# Patient Record
Sex: FEMALE | Race: WHITE | Employment: FULL TIME | URBAN - METROPOLITAN AREA
[De-identification: names, ages, dates, MRNs, and addresses within clinical notes are randomized per-mention and may not be internally consistent; named-entity substitution may affect disease eponyms.]

---

## 2017-04-19 ENCOUNTER — HOSPITAL ENCOUNTER (OUTPATIENT)
Age: 37
Discharge: HOME | End: 2017-04-19
Payer: COMMERCIAL

## 2017-04-19 LAB
THYROXINE, FREE: 0.79 NG/DL (ref 0.93–1.7)
TSH SERPL DL<=0.05 MIU/L-ACNC: 4.47 MIU/L (ref 0.3–5)

## 2017-04-19 PROCEDURE — 84439 ASSAY OF FREE THYROXINE: CPT

## 2017-04-19 PROCEDURE — 36415 COLL VENOUS BLD VENIPUNCTURE: CPT

## 2017-04-19 PROCEDURE — 84443 ASSAY THYROID STIM HORMONE: CPT

## 2017-10-04 ENCOUNTER — HOSPITAL ENCOUNTER (OUTPATIENT)
Age: 37
Discharge: HOME | End: 2017-10-04
Payer: COMMERCIAL

## 2017-10-04 LAB
T3 FREE: 3.29 PG/ML (ref 2.02–4.43)
THYROXINE, FREE: 0.91 NG/DL (ref 0.93–1.7)
TSH SERPL DL<=0.05 MIU/L-ACNC: 4.68 MIU/L (ref 0.3–5)

## 2017-10-04 PROCEDURE — 36415 COLL VENOUS BLD VENIPUNCTURE: CPT

## 2017-10-04 PROCEDURE — 84443 ASSAY THYROID STIM HORMONE: CPT

## 2017-10-04 PROCEDURE — 84481 FREE ASSAY (FT-3): CPT

## 2017-10-04 PROCEDURE — 84439 ASSAY OF FREE THYROXINE: CPT

## 2017-10-17 ENCOUNTER — HOSPITAL ENCOUNTER (EMERGENCY)
Age: 37
Discharge: HOME | End: 2017-10-17
Attending: EMERGENCY MEDICINE
Payer: COMMERCIAL

## 2017-10-17 VITALS
RESPIRATION RATE: 18 BRPM | HEART RATE: 82 BPM | SYSTOLIC BLOOD PRESSURE: 161 MMHG | TEMPERATURE: 98.1 F | BODY MASS INDEX: 47.43 KG/M2 | OXYGEN SATURATION: 99 % | WEIGHT: 285 LBS | DIASTOLIC BLOOD PRESSURE: 87 MMHG

## 2017-10-17 DIAGNOSIS — M54.31 RIGHT SIDED SCIATICA: Primary | ICD-10-CM

## 2017-10-17 PROCEDURE — 6370000000 HC RX 637 (ALT 250 FOR IP): Performed by: PHYSICIAN ASSISTANT

## 2017-10-17 PROCEDURE — 99283 EMERGENCY DEPT VISIT LOW MDM: CPT

## 2017-10-17 RX ORDER — LIOTHYRONINE SODIUM 5 UG/1
5 TABLET ORAL 2 TIMES DAILY
COMMUNITY

## 2017-10-17 RX ORDER — PREDNISONE 20 MG/1
50 TABLET ORAL ONCE
Status: COMPLETED | OUTPATIENT
Start: 2017-10-17 | End: 2017-10-17

## 2017-10-17 RX ORDER — PREDNISONE 10 MG/1
50 TABLET ORAL DAILY
Qty: 25 TABLET | Refills: 0 | Status: SHIPPED | OUTPATIENT
Start: 2017-10-17 | End: 2017-10-22

## 2017-10-17 RX ORDER — TRAMADOL HYDROCHLORIDE 50 MG/1
50 TABLET ORAL EVERY 6 HOURS PRN
Qty: 15 TABLET | Refills: 0 | Status: SHIPPED | OUTPATIENT
Start: 2017-10-17 | End: 2017-12-04

## 2017-10-17 RX ADMIN — PREDNISONE 50 MG: 20 TABLET ORAL at 20:53

## 2017-10-17 ASSESSMENT — ENCOUNTER SYMPTOMS
NAUSEA: 0
VOMITING: 0
ABDOMINAL PAIN: 0
SORE THROAT: 0

## 2017-10-17 ASSESSMENT — PAIN DESCRIPTION - PAIN TYPE: TYPE: ACUTE PAIN

## 2017-10-17 ASSESSMENT — PAIN DESCRIPTION - FREQUENCY: FREQUENCY: CONTINUOUS

## 2017-10-17 ASSESSMENT — PAIN DESCRIPTION - LOCATION: LOCATION: LEG;BUTTOCKS

## 2017-10-17 ASSESSMENT — PAIN DESCRIPTION - ORIENTATION: ORIENTATION: RIGHT

## 2017-10-17 ASSESSMENT — PAIN SCALES - GENERAL: PAINLEVEL_OUTOF10: 9

## 2017-10-17 ASSESSMENT — PAIN DESCRIPTION - DESCRIPTORS: DESCRIPTORS: SHOOTING;SHARP

## 2017-10-18 ENCOUNTER — TELEPHONE (OUTPATIENT)
Dept: FAMILY MEDICINE CLINIC | Age: 37
End: 2017-10-18

## 2017-10-18 ASSESSMENT — ENCOUNTER SYMPTOMS
COUGH: 0
BACK PAIN: 0
EYE DISCHARGE: 0
EYE REDNESS: 0
SHORTNESS OF BREATH: 0

## 2017-10-18 NOTE — ED PROVIDER NOTES
[facility] ED  [address] [address]  Phone: [phone]  Fax: [phone]      Pt Name: Johana Ernandez  MRN: [id number]  Kellie 1980  Date of evaluation: 10/17/2017      CHIEF COMPLAINT       Chief Complaint   Patient presents with    Leg Pain     right leg       HISTORY OF PRESENT ILLNESS   (Location, Quality, Severity, Duration, Timing, Context, Modifying Factors, Associated Signs and Symptoms)     Sneha Morales is a 39 y.o. female who presents to the ER for evaluation of right lower extremity pain. Patient states that over the past day, she has pain over the right buttocks that radiates down into the right lower extremity. Patient denies acute injury to the leg and back. She denies changes in bowel or bladder habits. Patient denies numbness and weakness in her lower extremities. She is able to ambulate. She states that she has taken Advil for her discomfort with no relief. She has also applied heat to the affected area and perform stretching exercises. She has no prior history of sciatica. She rates her current discomfort at 9/10. Nursing Notes were reviewed. REVIEW OF SYSTEMS     (2-9 systems for level 4, 10 or more for level 5)    Review of Systems   Constitutional: Negative for chills and fever. HENT: Negative for congestion, ear pain and sore throat. Eyes: Negative for discharge and redness. Respiratory: Negative for cough and shortness of breath. Cardiovascular: Negative for chest pain. Gastrointestinal: Negative for abdominal pain, nausea and vomiting. Genitourinary: Negative for dysuria and frequency. Musculoskeletal: Negative for back pain and neck pain. Right leg pain. Skin: Negative for rash. Neurological: Negative for dizziness, seizures, syncope and headaches. Psychiatric/Behavioral: Negative for self-injury and suicidal ideas. All other systems reviewed and are negative.     PAST MEDICAL HISTORY    has a past provide her with stretching exercises to help with the sciatica. Follow-up evaluation with primary care doctor in the next 2-3 days. EMERGENCY DEPARTMENT COURSE:   Vitals:    Vitals:    10/17/17 2000   BP: (!) 161/87   Pulse: 82   Resp: 18   Temp: 98.1 °F (36.7 °C)   TempSrc: Oral   SpO2: 99%   Weight: 129.3 kg (285 lb)     -------------------------  BP: (!) 161/87, Temp: 98.1 °F (36.7 °C), Pulse: 82, Resp: 18    The patient was given the following medications:  Orders Placed This Encounter   Medications    predniSONE (DELTASONE) 10 MG tablet     Sig: Take 5 tablets by mouth daily for 5 days     Dispense:  25 tablet     Refill:  0    traMADol (ULTRAM) 50 MG tablet     Sig: Take 1 tablet by mouth every 6 hours as needed for Pain     Dispense:  15 tablet     Refill:  0      FINAL IMPRESSION      1.  Right sided sciatica        DISPOSITION/PLAN   DISPOSITION Decision to Discharge- home    Condition on Disposition  Stable    PATIENT REFERRED TO:  Monica Yanez [address]  [phone]    Schedule an appointment as soon as possible for a visit in 2 days  For reevaluation    DISCHARGE MEDICATIONS:  New Prescriptions    PREDNISONE (DELTASONE) 10 MG TABLET    Take 5 tablets by mouth daily for 5 days    TRAMADOL (ULTRAM) 50 MG TABLET    Take 1 tablet by mouth every 6 hours as needed for Pain     (Please note that portions of this note were completed with a voice recognition program.  Efforts were made to edit the dictations but occasionally words are mis-transcribed.)    Tyrese Bergeron PA-C  10/18/17 8673

## 2017-10-18 NOTE — ED TRIAGE NOTES
Pt to ED with c/c of right leg pain. Pt states pain started yesterday, denies trauma or injury. Pt states that pain is radiating from mid right buttock down posterior leg to calf. Pt reports using heat and stretching to alleviate pain, which did not help. Aleve and Advil provided no relief. Warm blanket provided, call light within reach, PA-C at bedside, updated on pt condition.

## 2017-10-18 NOTE — ED PROVIDER NOTES
[facility] ED  [address] [place] [address]  Phone: [phone]  Fax: [phone]      Attending Physician Attestation    I performed a history and physical examination of the patient and discussed management with the mid level provider. I reviewed the mid level provider's note and agree with the documented findings and plan of care. Any areas of disagreement are noted on the chart. I was personally present for the key portions of any procedures. I have documented in the chart those procedures where I was not present during the key portions. I have reviewed the emergency nurses triage note. I agree with the chief complaint, past medical history, past surgical history, allergies, medications, social and family history as documented unless otherwise noted below. Documentation of the HPI, Physical Exam and Medical Decision Making performed by mid level providers is based on my personal performance of the HPI, PE and MDM. For Physician Assistant/ Nurse Practitioner cases/documentation I have personally evaluated this patient and have completed at least one if not all key elements of the E/M (history, physical exam, and MDM). Additional findings are as noted. CHIEF COMPLAINT       Chief Complaint   Patient presents with    Leg Pain     right leg         HISTORY OF PRESENT ILLNESS    Sneha Morales is a 39 y.o. female who presents Complaining of pain in her right buttocks going down her right leg. Pain is worse with movement and better without. She states that she just cannot get comfortable. She denies any known injury or trauma. No saddle paresthesias. No problems of bowel or bladder. PAST MEDICAL HISTORY    has a past medical history of Asthma. SURGICAL HISTORY      has a past surgical history that includes  section.     CURRENT MEDICATIONS       Previous Medications    B COMPLEX VITAMINS CAPSULE    Take 1 capsule by mouth 3 times daily (before meals) B-Right

## 2017-10-18 NOTE — TELEPHONE ENCOUNTER
Called for [place] f/frandy Vazquez on voice mail to call back & if pain persists we can do an MRI but would need to be seen.

## 2017-10-18 NOTE — TELEPHONE ENCOUNTER
----- Message from Germain Diaz MD sent at 10/18/2017 12:30 PM EDT -----  Needs a psoas muscle MRI if pain persists  ----- Message -----  From: Lynn Carroll MD  Sent: 10/17/2017   8:55 PM  To: Germain Diaz MD

## 2017-11-28 ENCOUNTER — HOSPITAL ENCOUNTER (OUTPATIENT)
Dept: NUCLEAR MEDICINE | Age: 37
Discharge: HOME | End: 2017-11-28
Payer: COMMERCIAL

## 2017-11-28 DIAGNOSIS — C73 THYROID CANCER (HCC): ICD-10-CM

## 2017-11-28 LAB — HCG QUALITATIVE: NEGATIVE

## 2017-11-28 PROCEDURE — 84703 CHORIONIC GONADOTROPIN ASSAY: CPT

## 2017-11-29 ENCOUNTER — HOSPITAL ENCOUNTER (OUTPATIENT)
Dept: NUCLEAR MEDICINE | Age: 37
Discharge: HOME | End: 2017-11-29
Payer: COMMERCIAL

## 2017-11-29 DIAGNOSIS — C73 THYROID CANCER (HCC): ICD-10-CM

## 2017-11-29 PROCEDURE — 3430000000 HC RX DIAGNOSTIC RADIOPHARMACEUTICAL: Performed by: INTERNAL MEDICINE

## 2017-11-29 PROCEDURE — 78018 THYROID MET IMAGING BODY: CPT

## 2017-11-29 PROCEDURE — A9517 I131 IODIDE CAP, RX: HCPCS | Performed by: INTERNAL MEDICINE

## 2017-11-29 PROCEDURE — 79005 NUCLEAR RX ORAL ADMIN: CPT

## 2017-11-29 RX ADMIN — Medication 103 MILLICURIE: at 15:00

## 2017-12-04 ENCOUNTER — HOSPITAL ENCOUNTER (EMERGENCY)
Age: 37
Discharge: HOME | End: 2017-12-04
Payer: COMMERCIAL

## 2017-12-04 VITALS
OXYGEN SATURATION: 98 % | TEMPERATURE: 98.6 F | DIASTOLIC BLOOD PRESSURE: 76 MMHG | SYSTOLIC BLOOD PRESSURE: 145 MMHG | BODY MASS INDEX: 47.48 KG/M2 | RESPIRATION RATE: 16 BRPM | HEIGHT: 65 IN | HEART RATE: 87 BPM | WEIGHT: 285 LBS

## 2017-12-04 DIAGNOSIS — J01.00 ACUTE NON-RECURRENT MAXILLARY SINUSITIS: ICD-10-CM

## 2017-12-04 DIAGNOSIS — J40 BRONCHITIS: Primary | ICD-10-CM

## 2017-12-04 PROCEDURE — 99212 OFFICE O/P EST SF 10 MIN: CPT

## 2017-12-04 PROCEDURE — 99213 OFFICE O/P EST LOW 20 MIN: CPT | Performed by: NURSE PRACTITIONER

## 2017-12-04 RX ORDER — FLUTICASONE PROPIONATE 50 MCG
1 SPRAY, SUSPENSION (ML) NASAL DAILY
Qty: 1 BOTTLE | Refills: 3 | Status: SHIPPED | OUTPATIENT
Start: 2017-12-04 | End: 2017-12-04

## 2017-12-04 RX ORDER — PREDNISONE 10 MG/1
10 TABLET ORAL DAILY
Qty: 7 TABLET | Refills: 0 | Status: SHIPPED | OUTPATIENT
Start: 2017-12-04 | End: 2017-12-11

## 2017-12-04 RX ORDER — BENZONATATE 200 MG/1
200 CAPSULE ORAL 3 TIMES DAILY PRN
Qty: 21 CAPSULE | Refills: 0 | Status: SHIPPED | OUTPATIENT
Start: 2017-12-04 | End: 2017-12-04

## 2017-12-04 RX ORDER — AMOXICILLIN AND CLAVULANATE POTASSIUM 875; 125 MG/1; MG/1
1 TABLET, FILM COATED ORAL 2 TIMES DAILY
Qty: 14 TABLET | Refills: 0 | Status: SHIPPED | OUTPATIENT
Start: 2017-12-04 | End: 2017-12-11

## 2017-12-04 RX ORDER — BENZONATATE 200 MG/1
200 CAPSULE ORAL 3 TIMES DAILY PRN
Qty: 21 CAPSULE | Refills: 0 | Status: SHIPPED | OUTPATIENT
Start: 2017-12-04 | End: 2017-12-11

## 2017-12-04 RX ORDER — FLUTICASONE PROPIONATE 50 MCG
1 SPRAY, SUSPENSION (ML) NASAL DAILY
Qty: 1 BOTTLE | Refills: 3 | Status: SHIPPED | OUTPATIENT
Start: 2017-12-04 | End: 2021-03-25 | Stop reason: ALTCHOICE

## 2017-12-04 NOTE — ED TRIAGE NOTES
Sneha arrives ambulatory by self  to room with complaint of URI. Symptoms started 5  days ago. Trenton Fried has a  Productive cough withgreen sputum.

## 2017-12-04 NOTE — ED PROVIDER NOTES
[facility]  Urgent Care Encounter      CHIEF COMPLAINT       Chief Complaint   Patient presents with    URI     Nurses Notes reviewed and I agree except as noted in the HPI. HISTORY OF PRESENT ILLNESS   Janel Morales is a 44 y.o. female who presents with complaints of a headache for 5 days, ear pressure, watery eyes, green/thick nasal drainage, coughing up thick green mucus, frontal sinus pressure. Denies fevers. No known exposure. No recent travel. No recent antibiotic use. She is not a smoker. She had sports inducted asthma as a child. She does have seasonal allergies, she takes zyrtec as needed. She has tried OTC aleve cold and sinus, Robitussin with little relief. REVIEW OF SYSTEMS     Review of Systems    PAST MEDICAL HISTORY         Diagnosis Date    Asthma        SURGICAL HISTORY     Patient  has a past surgical history that includes  section. CURRENT MEDICATIONS       Previous Medications    B COMPLEX VITAMINS CAPSULE    Take 1 capsule by mouth 3 times daily (before meals) B-Right by Graciela HADLEY COMPLEX-FOLIC ACID (K-977 BALANCED TR PO)    Take 1 tablet by mouth 3 times daily (before meals)    CALCIUM-MAGNESIUM-VITAMIN D 500-250-200 MG-MG-UNIT TABS    Take 1 tablet by mouth 3 times daily (before meals)    CETIRIZINE HCL (ZYRTEC ALLERGY PO)    Take by mouth    LIOTHYRONINE (CYTOMEL) 5 MCG TABLET    Take 5 mcg by mouth 2 times daily    NORGESTIM-ETH ESTRAD TRIPHASIC (TRI-PREVIFEM) 0.18/0.215/0.25 MG-35 MCG TABS    Take 1 tablet by mouth daily    VITAMIN D (CHOLECALCIFEROL) 5000 UNITS CAPS CAPSULE    Take 5,000 Units by mouth daily       ALLERGIES     Patient is has No Known Allergies.     FAMILY HISTORY     Patient's family history includes Arrhythmia in her father; Arthritis in her maternal grandfather and maternal grandmother; Cancer in her maternal grandfather, maternal grandmother, and paternal grandfather; Diabetes in her father; Heart Attack in her father; Heart Disease in her father; Heart Failure in her maternal grandmother; High Blood Pressure in her paternal grandmother; Neuropathy in her maternal grandmother; Other in her mother; Parkinsonism in her maternal grandfather; Prostate Cancer in her paternal grandfather; Stroke in her father. SOCIAL HISTORY     Patient  reports that she has never smoked. She has never used smokeless tobacco. She reports that she does not drink alcohol or use drugs. PHYSICAL EXAM     ED TRIAGE VITALS  BP: (!) 145/76, Temp: 98.6 °F (37 °C), Pulse: 87, Resp: 16, SpO2: 98 %  Physical Exam    DIAGNOSTIC RESULTS   Labs:No results found for this visit on 12/04/17. IMAGING:    URGENT CARE COURSE:     Vitals:    12/04/17 1125   BP: (!) 145/76   Pulse: 87   Resp: 16   Temp: 98.6 °F (37 °C)   TempSrc: Oral   SpO2: 98%   Weight: 285 lb (129.3 kg)   Height: 5' 5\" (1.651 m)       Medications - No data to display  PROCEDURES:  None  FINAL IMPRESSION      1. Bronchitis    2. Acute non-recurrent maxillary sinusitis        DISPOSITION/PLAN   DISPOSITION Decision to Discharge     ·   · Augmentin as directed until gone  · Flonase nasal spray  · Tessalon for the cough - take at night  · Prednisone as directed until gone  · Get plenty of rest  · Increase fluids  · Avoid secondhand smoke  · Can use the [place] Pot to rinse the sinuses as needed  · Cool-mist humidifier at night   · Use Tylenol or Ibuprofen (motrin) OTC as directed for fever  · Mucinex for the cough, take with lots of water  · OTC decongestant for 3-4 days to relieve congestion  · Return to office  if symptoms do not start to improve over the 7-10 days      Decision to Discharge nontoxic, well-hydrated, normal airway. Patient is afebrile and stable. No airway abscess or epiglottitis, sepsis, CNS infection, pneumonia, hypoxia, bronchospasm.  No ACS, CHF, PE    Patient was advised that the signs and symptoms are consistent with bronchitis and sinusitis  Will treat with Augmenting, Flonase, and Prednisone. Can also use Tylenol and Motrin as directed for fever and pain. Frequent handwashing, She can do the nasal saline wash - [place] Pot for sinus congestion  Rest as much as possible  increased oral clear liquids, vaporizer, rest.       Avoid tobacco exposure,Take medication as directed,Drink Lots of fluids. Advised to follow up with family doctor in the next 5-7 days for reevaluation. The patient may return to urgent care if does not get better or symptoms worsen. Patient is advised to go to ER immediately if present symptoms worsen, high fever >102, vomiting, breathing difficulty, chest pain, lethargy or new symptoms develop. Patient understands this approach of home management and agrees to the treatment plan. The patient  was advised that at this point the patient can be treated safely at home and should be aware of following interventions and advised to the watch for the following:  #1. Any increasing pain not controlled with Motrin or Tylenol. #2. Any development of drainage from the ears redness of the auricle or posterior ear. #3. Development of the any fever chills, headache or stiffness of the neck the patient needs to be reevaluated by the primary care provider, return here or go to the emergency department for reevaluation. The patient is agreeable to the outpatient management at this time. They are advised to follow-up with her primary care provider in 5-7 days for reevaluation. The patient left ambulatory without any problems.     PATIENT REFERRED TO:  Raquel Harris MD  [address] [place] [address]  [phone]    In 1 week  As needed, If symptoms worsen    DISCHARGE MEDICATIONS:  New Prescriptions    AMOXICILLIN-CLAVULANATE (AUGMENTIN) 875-125 MG PER TABLET    Take 1 tablet by mouth 2 times daily for 7 days    BENZONATATE (TESSALON) 200 MG CAPSULE    Take 1 capsule by mouth 3 times daily as needed for Cough    FLUTICASONE (FLONASE) 50 MCG/ACT NASAL SPRAY    1 spray by Nasal route daily    PREDNISONE (DELTASONE) 10 MG TABLET    Take 1 tablet by mouth daily for 7 days     Current Discharge Medication List          Jayne Michel, [address] [place] [address]  12/04/17 6742

## 2018-03-13 ENCOUNTER — HOSPITAL ENCOUNTER (OUTPATIENT)
Age: 38
Discharge: HOME | End: 2018-03-13
Payer: COMMERCIAL

## 2018-03-13 LAB
ABSOLUTE EOS #: 0.08 K/UL (ref 0–0.44)
ABSOLUTE IMMATURE GRANULOCYTE: <0.03 K/UL (ref 0–0.3)
ABSOLUTE LYMPH #: 1.55 K/UL (ref 1.1–3.7)
ABSOLUTE MONO #: 0.51 K/UL (ref 0.1–1.2)
ALBUMIN SERPL-MCNC: 3.9 G/DL (ref 3.5–5.2)
ALBUMIN/GLOBULIN RATIO: 1.2 (ref 1–2.5)
ALP BLD-CCNC: 93 U/L (ref 35–104)
ALT SERPL-CCNC: 48 U/L (ref 5–33)
ANION GAP SERPL CALCULATED.3IONS-SCNC: 15 MMOL/L (ref 9–17)
AST SERPL-CCNC: 47 U/L
BASOPHILS # BLD: 0 % (ref 0–2)
BASOPHILS ABSOLUTE: 0.03 K/UL (ref 0–0.2)
BILIRUB SERPL-MCNC: 0.52 MG/DL (ref 0.3–1.2)
BUN BLDV-MCNC: 7 MG/DL (ref 6–20)
BUN/CREAT BLD: ABNORMAL (ref 9–20)
CALCIUM SERPL-MCNC: 9 MG/DL (ref 8.6–10.4)
CHLORIDE BLD-SCNC: 100 MMOL/L (ref 98–107)
CO2: 26 MMOL/L (ref 20–31)
CREAT SERPL-MCNC: 0.61 MG/DL (ref 0.5–0.9)
DIFFERENTIAL TYPE: ABNORMAL
EOSINOPHILS RELATIVE PERCENT: 1 % (ref 1–4)
FOLATE: 3.6 NG/ML
GFR AFRICAN AMERICAN: >60 ML/MIN
GFR NON-AFRICAN AMERICAN: >60 ML/MIN
GFR SERPL CREATININE-BSD FRML MDRD: ABNORMAL ML/MIN/{1.73_M2}
GFR SERPL CREATININE-BSD FRML MDRD: ABNORMAL ML/MIN/{1.73_M2}
GLUCOSE BLD-MCNC: 161 MG/DL (ref 70–99)
HCT VFR BLD CALC: 42.2 % (ref 36.3–47.1)
HEMOGLOBIN: 13 G/DL (ref 11.9–15.1)
IMMATURE GRANULOCYTES: 0 %
IRON: 49 UG/DL (ref 37–145)
LYMPHOCYTES # BLD: 22 % (ref 24–43)
MAGNESIUM: 2.1 MG/DL (ref 1.6–2.6)
MCH RBC QN AUTO: 27.8 PG (ref 25.2–33.5)
MCHC RBC AUTO-ENTMCNC: 30.8 G/DL (ref 28.4–34.8)
MCV RBC AUTO: 90.4 FL (ref 82.6–102.9)
MONOCYTES # BLD: 7 % (ref 3–12)
NRBC AUTOMATED: 0 PER 100 WBC
PDW BLD-RTO: 13 % (ref 11.8–14.4)
PHOSPHORUS: 2.6 MG/DL (ref 2.6–4.5)
PLATELET # BLD: 309 K/UL (ref 138–453)
PLATELET ESTIMATE: ABNORMAL
PMV BLD AUTO: 11.5 FL (ref 8.1–13.5)
POTASSIUM SERPL-SCNC: 4.1 MMOL/L (ref 3.7–5.3)
RBC # BLD: 4.67 M/UL (ref 3.95–5.11)
RBC # BLD: ABNORMAL 10*6/UL
SEG NEUTROPHILS: 70 % (ref 36–65)
SEGMENTED NEUTROPHILS ABSOLUTE COUNT: 4.75 K/UL (ref 1.5–8.1)
SODIUM BLD-SCNC: 141 MMOL/L (ref 135–144)
TOTAL PROTEIN: 7.2 G/DL (ref 6.4–8.3)
VITAMIN B-12: 793 PG/ML (ref 232–1245)
VITAMIN D 25-HYDROXY: 17.3 NG/ML (ref 30–100)
WBC # BLD: 6.9 K/UL (ref 3.5–11.3)
WBC # BLD: ABNORMAL 10*3/UL

## 2018-03-13 PROCEDURE — 84425 ASSAY OF VITAMIN B-1: CPT

## 2018-03-13 PROCEDURE — 82607 VITAMIN B-12: CPT

## 2018-03-13 PROCEDURE — 83735 ASSAY OF MAGNESIUM: CPT

## 2018-03-13 PROCEDURE — 80053 COMPREHEN METABOLIC PANEL: CPT

## 2018-03-13 PROCEDURE — 36415 COLL VENOUS BLD VENIPUNCTURE: CPT

## 2018-03-13 PROCEDURE — 82306 VITAMIN D 25 HYDROXY: CPT

## 2018-03-13 PROCEDURE — 84100 ASSAY OF PHOSPHORUS: CPT

## 2018-03-13 PROCEDURE — 82746 ASSAY OF FOLIC ACID SERUM: CPT

## 2018-03-13 PROCEDURE — 85025 COMPLETE CBC W/AUTO DIFF WBC: CPT

## 2018-03-13 PROCEDURE — 83540 ASSAY OF IRON: CPT

## 2018-03-20 LAB — VITAMIN B1 WHOLE BLOOD: 86 NMOL/L (ref 70–180)

## 2018-04-04 ENCOUNTER — HOSPITAL ENCOUNTER (OUTPATIENT)
Age: 38
Discharge: HOME | End: 2018-04-04
Payer: COMMERCIAL

## 2018-04-04 LAB
CALCIUM SERPL-MCNC: 9.3 MG/DL (ref 8.6–10.4)
TESTOSTERONE TOTAL: 48 NG/DL (ref 20–70)
THYROGLOBULIN AB: <20 IU/ML (ref 0–40)
THYROGLOBULIN: <0.2 NG/ML (ref 0–63.4)
THYROXINE, FREE: 2.28 NG/DL (ref 0.93–1.7)
TSH SERPL DL<=0.05 MIU/L-ACNC: 0.07 MIU/L (ref 0.3–5)

## 2018-04-04 PROCEDURE — 83498 ASY HYDROXYPROGESTERONE 17-D: CPT

## 2018-04-04 PROCEDURE — 36415 COLL VENOUS BLD VENIPUNCTURE: CPT

## 2018-04-04 PROCEDURE — 84443 ASSAY THYROID STIM HORMONE: CPT

## 2018-04-04 PROCEDURE — 84439 ASSAY OF FREE THYROXINE: CPT

## 2018-04-04 PROCEDURE — 82310 ASSAY OF CALCIUM: CPT

## 2018-04-04 PROCEDURE — 84403 ASSAY OF TOTAL TESTOSTERONE: CPT

## 2018-04-04 PROCEDURE — 86800 THYROGLOBULIN ANTIBODY: CPT

## 2018-04-04 PROCEDURE — 84432 ASSAY OF THYROGLOBULIN: CPT

## 2018-04-04 PROCEDURE — 82627 DEHYDROEPIANDROSTERONE: CPT

## 2018-04-05 LAB — DHEAS (DHEA SULFATE): 211 UG/DL (ref 45–270)

## 2018-04-07 LAB — 17 OH PROGESTERONE: 103 NG/DL

## 2018-05-10 ENCOUNTER — HOSPITAL ENCOUNTER (OUTPATIENT)
Age: 38
Discharge: HOME | End: 2018-05-10
Payer: COMMERCIAL

## 2018-05-10 LAB
ALBUMIN SERPL-MCNC: 4 G/DL (ref 3.5–5.2)
ALBUMIN/GLOBULIN RATIO: 1.3 (ref 1–2.5)
ALP BLD-CCNC: 100 U/L (ref 35–104)
ALT SERPL-CCNC: 21 U/L (ref 5–33)
ANION GAP SERPL CALCULATED.3IONS-SCNC: 12 MMOL/L (ref 9–17)
AST SERPL-CCNC: 18 U/L
BILIRUB SERPL-MCNC: 0.58 MG/DL (ref 0.3–1.2)
BUN BLDV-MCNC: 9 MG/DL (ref 6–20)
BUN/CREAT BLD: ABNORMAL (ref 9–20)
CALCIUM SERPL-MCNC: 8.6 MG/DL (ref 8.6–10.4)
CHLORIDE BLD-SCNC: 101 MMOL/L (ref 98–107)
CO2: 24 MMOL/L (ref 20–31)
CREAT SERPL-MCNC: 0.52 MG/DL (ref 0.5–0.9)
GFR AFRICAN AMERICAN: >60 ML/MIN
GFR NON-AFRICAN AMERICAN: >60 ML/MIN
GFR SERPL CREATININE-BSD FRML MDRD: ABNORMAL ML/MIN/{1.73_M2}
GFR SERPL CREATININE-BSD FRML MDRD: ABNORMAL ML/MIN/{1.73_M2}
GLUCOSE BLD-MCNC: 235 MG/DL (ref 70–99)
IRON SATURATION: 20 % (ref 20–55)
IRON: 58 UG/DL (ref 37–145)
POTASSIUM SERPL-SCNC: 3.9 MMOL/L (ref 3.7–5.3)
SODIUM BLD-SCNC: 137 MMOL/L (ref 135–144)
THYROXINE, FREE: 2.47 NG/DL (ref 0.93–1.7)
TOTAL IRON BINDING CAPACITY: 286 UG/DL (ref 250–450)
TOTAL PROTEIN: 7.2 G/DL (ref 6.4–8.3)
TSH SERPL DL<=0.05 MIU/L-ACNC: 0.04 MIU/L (ref 0.3–5)
UNSATURATED IRON BINDING CAPACITY: 228 UG/DL (ref 112–347)
VITAMIN D 25-HYDROXY: 21.5 NG/ML (ref 30–100)

## 2018-05-10 PROCEDURE — 83540 ASSAY OF IRON: CPT

## 2018-05-10 PROCEDURE — 82306 VITAMIN D 25 HYDROXY: CPT

## 2018-05-10 PROCEDURE — 83550 IRON BINDING TEST: CPT

## 2018-05-10 PROCEDURE — 80053 COMPREHEN METABOLIC PANEL: CPT

## 2018-05-10 PROCEDURE — 36415 COLL VENOUS BLD VENIPUNCTURE: CPT

## 2018-05-10 PROCEDURE — 84439 ASSAY OF FREE THYROXINE: CPT

## 2018-05-10 PROCEDURE — 84443 ASSAY THYROID STIM HORMONE: CPT

## 2018-05-10 PROCEDURE — 84432 ASSAY OF THYROGLOBULIN: CPT

## 2018-05-10 PROCEDURE — 86800 THYROGLOBULIN ANTIBODY: CPT

## 2018-05-11 LAB
THYROGLOBULIN AB: <20 IU/ML (ref 0–40)
THYROGLOBULIN: <0.2 NG/ML (ref 0–63.4)

## 2018-07-06 ENCOUNTER — HOSPITAL ENCOUNTER (OUTPATIENT)
Age: 38
Discharge: HOME | End: 2018-07-06
Payer: COMMERCIAL

## 2018-07-06 LAB
ABSOLUTE EOS #: 0.07 K/UL (ref 0–0.44)
ABSOLUTE IMMATURE GRANULOCYTE: <0.03 K/UL (ref 0–0.3)
ABSOLUTE LYMPH #: 1.65 K/UL (ref 1.1–3.7)
ABSOLUTE MONO #: 0.43 K/UL (ref 0.1–1.2)
ALBUMIN SERPL-MCNC: 4.3 G/DL (ref 3.5–5.2)
ALBUMIN/GLOBULIN RATIO: 1.4 (ref 1–2.5)
ALP BLD-CCNC: 102 U/L (ref 35–104)
ALT SERPL-CCNC: 15 U/L (ref 5–33)
ANION GAP SERPL CALCULATED.3IONS-SCNC: 15 MMOL/L (ref 9–17)
AST SERPL-CCNC: 18 U/L
BASOPHILS # BLD: 0 % (ref 0–2)
BASOPHILS ABSOLUTE: 0.03 K/UL (ref 0–0.2)
BILIRUB SERPL-MCNC: 0.63 MG/DL (ref 0.3–1.2)
BUN BLDV-MCNC: 7 MG/DL (ref 6–20)
BUN/CREAT BLD: ABNORMAL (ref 9–20)
CALCIUM SERPL-MCNC: 9 MG/DL (ref 8.6–10.4)
CHLORIDE BLD-SCNC: 102 MMOL/L (ref 98–107)
CO2: 23 MMOL/L (ref 20–31)
CREAT SERPL-MCNC: 0.52 MG/DL (ref 0.5–0.9)
DIFFERENTIAL TYPE: ABNORMAL
EOSINOPHILS RELATIVE PERCENT: 1 % (ref 1–4)
FOLATE: 8.1 NG/ML
GFR AFRICAN AMERICAN: >60 ML/MIN
GFR NON-AFRICAN AMERICAN: >60 ML/MIN
GFR SERPL CREATININE-BSD FRML MDRD: ABNORMAL ML/MIN/{1.73_M2}
GFR SERPL CREATININE-BSD FRML MDRD: ABNORMAL ML/MIN/{1.73_M2}
GLUCOSE BLD-MCNC: 216 MG/DL (ref 70–99)
HCT VFR BLD CALC: 42.6 % (ref 36.3–47.1)
HEMOGLOBIN: 13.7 G/DL (ref 11.9–15.1)
IMMATURE GRANULOCYTES: 0 %
IRON SATURATION: 42 % (ref 20–55)
IRON: 121 UG/DL (ref 37–145)
IRON: 123 UG/DL (ref 37–145)
LYMPHOCYTES # BLD: 23 % (ref 24–43)
MAGNESIUM: 2 MG/DL (ref 1.6–2.6)
MCH RBC QN AUTO: 28 PG (ref 25.2–33.5)
MCHC RBC AUTO-ENTMCNC: 32.2 G/DL (ref 28.4–34.8)
MCV RBC AUTO: 86.9 FL (ref 82.6–102.9)
MONOCYTES # BLD: 6 % (ref 3–12)
NRBC AUTOMATED: 0 PER 100 WBC
PDW BLD-RTO: 14.3 % (ref 11.8–14.4)
PHOSPHORUS: 3.1 MG/DL (ref 2.6–4.5)
PLATELET # BLD: 260 K/UL (ref 138–453)
PLATELET ESTIMATE: ABNORMAL
PMV BLD AUTO: 11.9 FL (ref 8.1–13.5)
POTASSIUM SERPL-SCNC: 4.1 MMOL/L (ref 3.7–5.3)
RBC # BLD: 4.9 M/UL (ref 3.95–5.11)
RBC # BLD: ABNORMAL 10*6/UL
SEG NEUTROPHILS: 70 % (ref 36–65)
SEGMENTED NEUTROPHILS ABSOLUTE COUNT: 5.09 K/UL (ref 1.5–8.1)
SODIUM BLD-SCNC: 140 MMOL/L (ref 135–144)
THYROXINE, FREE: 1.81 NG/DL (ref 0.93–1.7)
TOTAL IRON BINDING CAPACITY: 287 UG/DL (ref 250–450)
TOTAL PROTEIN: 7.3 G/DL (ref 6.4–8.3)
TSH SERPL DL<=0.05 MIU/L-ACNC: 0.78 MIU/L (ref 0.3–5)
UNSATURATED IRON BINDING CAPACITY: 166 UG/DL (ref 112–347)
VITAMIN B-12: 771 PG/ML (ref 232–1245)
VITAMIN D 25-HYDROXY: 32 NG/ML (ref 30–100)
VITAMIN D 25-HYDROXY: 32 NG/ML (ref 30–100)
WBC # BLD: 7.3 K/UL (ref 3.5–11.3)
WBC # BLD: ABNORMAL 10*3/UL

## 2018-07-06 PROCEDURE — 83550 IRON BINDING TEST: CPT

## 2018-07-06 PROCEDURE — 82306 VITAMIN D 25 HYDROXY: CPT

## 2018-07-06 PROCEDURE — 82746 ASSAY OF FOLIC ACID SERUM: CPT

## 2018-07-06 PROCEDURE — 85025 COMPLETE CBC W/AUTO DIFF WBC: CPT

## 2018-07-06 PROCEDURE — 82607 VITAMIN B-12: CPT

## 2018-07-06 PROCEDURE — 83540 ASSAY OF IRON: CPT

## 2018-07-06 PROCEDURE — 80053 COMPREHEN METABOLIC PANEL: CPT

## 2018-07-06 PROCEDURE — 83735 ASSAY OF MAGNESIUM: CPT

## 2018-07-06 PROCEDURE — 84425 ASSAY OF VITAMIN B-1: CPT

## 2018-07-06 PROCEDURE — 84432 ASSAY OF THYROGLOBULIN: CPT

## 2018-07-06 PROCEDURE — 86800 THYROGLOBULIN ANTIBODY: CPT

## 2018-07-06 PROCEDURE — 84443 ASSAY THYROID STIM HORMONE: CPT

## 2018-07-06 PROCEDURE — 84439 ASSAY OF FREE THYROXINE: CPT

## 2018-07-06 PROCEDURE — 84100 ASSAY OF PHOSPHORUS: CPT

## 2018-07-06 PROCEDURE — 36415 COLL VENOUS BLD VENIPUNCTURE: CPT

## 2018-07-09 LAB
THYROGLOBULIN AB: <20 IU/ML (ref 0–40)
THYROGLOBULIN: <0.2 NG/ML (ref 0–63.4)

## 2018-07-10 LAB — VITAMIN B1 WHOLE BLOOD: 101 NMOL/L (ref 70–180)

## 2018-09-26 ENCOUNTER — HOSPITAL ENCOUNTER (OUTPATIENT)
Age: 38
Discharge: HOME | End: 2018-09-26
Payer: COMMERCIAL

## 2018-09-26 ENCOUNTER — HOSPITAL ENCOUNTER (OUTPATIENT)
Dept: ULTRASOUND IMAGING | Age: 38
Discharge: HOME | End: 2018-09-28
Payer: COMMERCIAL

## 2018-09-26 DIAGNOSIS — C73 THYROID CANCER (HCC): ICD-10-CM

## 2018-09-26 LAB
IRON SATURATION: 36 % (ref 20–55)
IRON: 97 UG/DL (ref 37–145)
THYROXINE, FREE: 1.92 NG/DL (ref 0.93–1.7)
TOTAL IRON BINDING CAPACITY: 271 UG/DL (ref 250–450)
TSH SERPL DL<=0.05 MIU/L-ACNC: 0.19 MIU/L (ref 0.3–5)
UNSATURATED IRON BINDING CAPACITY: 174 UG/DL (ref 112–347)
VITAMIN D 25-HYDROXY: 37 NG/ML (ref 30–100)

## 2018-09-26 PROCEDURE — 83550 IRON BINDING TEST: CPT

## 2018-09-26 PROCEDURE — 76536 US EXAM OF HEAD AND NECK: CPT

## 2018-09-26 PROCEDURE — 86800 THYROGLOBULIN ANTIBODY: CPT

## 2018-09-26 PROCEDURE — 83540 ASSAY OF IRON: CPT

## 2018-09-26 PROCEDURE — 82306 VITAMIN D 25 HYDROXY: CPT

## 2018-09-26 PROCEDURE — 84443 ASSAY THYROID STIM HORMONE: CPT

## 2018-09-26 PROCEDURE — 86376 MICROSOMAL ANTIBODY EACH: CPT

## 2018-09-26 PROCEDURE — 84439 ASSAY OF FREE THYROXINE: CPT

## 2018-09-26 PROCEDURE — 84432 ASSAY OF THYROGLOBULIN: CPT

## 2018-09-26 PROCEDURE — 36415 COLL VENOUS BLD VENIPUNCTURE: CPT

## 2018-09-27 LAB
THYROGLOBULIN AB: <20 IU/ML (ref 0–40)
THYROGLOBULIN: <0.2 NG/ML (ref 0–63.4)
THYROID PEROXIDASE (TPO) AB: 43.4 IU/ML (ref 0–35)

## 2018-10-29 ENCOUNTER — HOSPITAL ENCOUNTER (OUTPATIENT)
Age: 38
Discharge: HOME | End: 2018-10-29
Payer: COMMERCIAL

## 2018-10-29 LAB
ALBUMIN SERPL-MCNC: 4.3 G/DL (ref 3.5–5.2)
ALBUMIN/GLOBULIN RATIO: 1.4 (ref 1–2.5)
ALP BLD-CCNC: 99 U/L (ref 35–104)
ALT SERPL-CCNC: 17 U/L (ref 5–33)
ANION GAP SERPL CALCULATED.3IONS-SCNC: 12 MMOL/L (ref 9–17)
AST SERPL-CCNC: 17 U/L
BILIRUB SERPL-MCNC: 0.63 MG/DL (ref 0.3–1.2)
BUN BLDV-MCNC: 8 MG/DL (ref 6–20)
BUN/CREAT BLD: ABNORMAL (ref 9–20)
CALCIUM SERPL-MCNC: 9.5 MG/DL (ref 8.6–10.4)
CHLORIDE BLD-SCNC: 96 MMOL/L (ref 98–107)
CO2: 28 MMOL/L (ref 20–31)
CREAT SERPL-MCNC: 0.53 MG/DL (ref 0.5–0.9)
GFR AFRICAN AMERICAN: >60 ML/MIN
GFR NON-AFRICAN AMERICAN: >60 ML/MIN
GFR SERPL CREATININE-BSD FRML MDRD: ABNORMAL ML/MIN/{1.73_M2}
GFR SERPL CREATININE-BSD FRML MDRD: ABNORMAL ML/MIN/{1.73_M2}
GLUCOSE BLD-MCNC: 299 MG/DL (ref 70–99)
IRON SATURATION: 43 % (ref 20–55)
IRON: 117 UG/DL (ref 37–145)
POTASSIUM SERPL-SCNC: 4.3 MMOL/L (ref 3.7–5.3)
SODIUM BLD-SCNC: 136 MMOL/L (ref 135–144)
TOTAL IRON BINDING CAPACITY: 275 UG/DL (ref 250–450)
TOTAL PROTEIN: 7.3 G/DL (ref 6.4–8.3)
UNSATURATED IRON BINDING CAPACITY: 158 UG/DL (ref 112–347)

## 2018-10-29 PROCEDURE — 84244 ASSAY OF RENIN: CPT

## 2018-10-29 PROCEDURE — 83540 ASSAY OF IRON: CPT

## 2018-10-29 PROCEDURE — 82088 ASSAY OF ALDOSTERONE: CPT

## 2018-10-29 PROCEDURE — 36415 COLL VENOUS BLD VENIPUNCTURE: CPT

## 2018-10-29 PROCEDURE — 80053 COMPREHEN METABOLIC PANEL: CPT

## 2018-10-29 PROCEDURE — 83550 IRON BINDING TEST: CPT

## 2018-10-31 LAB
ALDOSTERONE COMMENT: NORMAL
ALDOSTERONE: 14.7 NG/DL

## 2018-11-01 LAB
RENIN ACTIVITY: 8 NG/ML/HR
RENIN COMMENT: NORMAL

## 2018-11-08 ENCOUNTER — HOSPITAL ENCOUNTER (OUTPATIENT)
Age: 38
Discharge: HOME | End: 2018-11-08
Payer: COMMERCIAL

## 2018-11-08 PROCEDURE — 36415 COLL VENOUS BLD VENIPUNCTURE: CPT

## 2018-11-08 PROCEDURE — 82952 GTT-ADDED SAMPLES: CPT

## 2018-11-08 PROCEDURE — 82951 GLUCOSE TOLERANCE TEST (GTT): CPT

## 2018-11-09 LAB
AMOUNT GLUCOSE GIVEN: 100 G
GLUCOSE FASTING: 281 MG/DL (ref 65–99)
GLUCOSE TOLERANCE TEST 1 HOUR: 541 MG/DL (ref 65–184)
GLUCOSE TOLERANCE TEST 2 HOUR: 422 MG/DL (ref 65–139)
GLUCOSE TOLERANCE TEST 3 HOUR: 338 MG/DL (ref 65–130)
GLUCOSE, 4 HOUR: 300 MG/DL (ref 65–125)
GLUCOSE, 5 HR: 260 MG/DL (ref 65–109)

## 2018-11-28 ENCOUNTER — HOSPITAL ENCOUNTER (OUTPATIENT)
Age: 38
Discharge: HOME | End: 2018-11-28
Payer: COMMERCIAL

## 2018-11-28 LAB
CHOLESTEROL: 124 MG/DL
CREATININE URINE: 350 MG/DL (ref 28–217)
HDLC SERPL-MCNC: 44 MG/DL
MICROALBUMIN/CREAT 24H UR: 35 MG/L
MICROALBUMIN/CREAT UR-RTO: 10 MCG/MG CREAT

## 2018-11-28 PROCEDURE — 36415 COLL VENOUS BLD VENIPUNCTURE: CPT

## 2018-11-28 PROCEDURE — 82570 ASSAY OF URINE CREATININE: CPT

## 2018-11-28 PROCEDURE — 83718 ASSAY OF LIPOPROTEIN: CPT

## 2018-11-28 PROCEDURE — 82465 ASSAY BLD/SERUM CHOLESTEROL: CPT

## 2018-11-28 PROCEDURE — 82043 UR ALBUMIN QUANTITATIVE: CPT

## 2018-12-29 ENCOUNTER — HOSPITAL ENCOUNTER (EMERGENCY)
Age: 38
Discharge: HOME | End: 2018-12-29
Attending: EMERGENCY MEDICINE
Payer: COMMERCIAL

## 2018-12-29 VITALS
HEART RATE: 87 BPM | OXYGEN SATURATION: 98 % | HEIGHT: 64 IN | WEIGHT: 160 LBS | TEMPERATURE: 98.1 F | SYSTOLIC BLOOD PRESSURE: 120 MMHG | BODY MASS INDEX: 27.31 KG/M2 | RESPIRATION RATE: 18 BRPM | DIASTOLIC BLOOD PRESSURE: 74 MMHG

## 2018-12-29 DIAGNOSIS — J02.0 STREPTOCOCCAL SORE THROAT: Primary | ICD-10-CM

## 2018-12-29 LAB
DIRECT EXAM: ABNORMAL
Lab: ABNORMAL
MONONUCLEOSIS SCREEN: NEGATIVE
SPECIMEN DESCRIPTION: ABNORMAL
STATUS: ABNORMAL

## 2018-12-29 PROCEDURE — 87880 STREP A ASSAY W/OPTIC: CPT

## 2018-12-29 PROCEDURE — 36415 COLL VENOUS BLD VENIPUNCTURE: CPT

## 2018-12-29 PROCEDURE — 86308 HETEROPHILE ANTIBODY SCREEN: CPT

## 2018-12-29 PROCEDURE — 99283 EMERGENCY DEPT VISIT LOW MDM: CPT

## 2018-12-29 RX ORDER — AMOXICILLIN 500 MG/1
500 CAPSULE ORAL 3 TIMES DAILY
Qty: 21 CAPSULE | Refills: 0 | Status: SHIPPED | OUTPATIENT
Start: 2018-12-29 | End: 2021-03-25 | Stop reason: ALTCHOICE

## 2018-12-29 ASSESSMENT — PAIN DESCRIPTION - LOCATION: LOCATION: THROAT

## 2018-12-29 ASSESSMENT — PAIN SCALES - GENERAL: PAINLEVEL_OUTOF10: 6

## 2018-12-29 ASSESSMENT — PAIN DESCRIPTION - PAIN TYPE: TYPE: ACUTE PAIN

## 2018-12-29 ASSESSMENT — PAIN DESCRIPTION - DESCRIPTORS: DESCRIPTORS: SHARP

## 2019-01-10 ENCOUNTER — HOSPITAL ENCOUNTER (OUTPATIENT)
Age: 39
Discharge: HOME | End: 2019-01-10
Payer: COMMERCIAL

## 2019-01-10 LAB
ABSOLUTE EOS #: 0.12 K/UL (ref 0–0.44)
ABSOLUTE IMMATURE GRANULOCYTE: <0.03 K/UL (ref 0–0.3)
ABSOLUTE LYMPH #: 2.25 K/UL (ref 1.1–3.7)
ABSOLUTE MONO #: 0.45 K/UL (ref 0.1–1.2)
BASOPHILS # BLD: 0 % (ref 0–2)
BASOPHILS ABSOLUTE: 0.04 K/UL (ref 0–0.2)
DIFFERENTIAL TYPE: ABNORMAL
EOSINOPHILS RELATIVE PERCENT: 1 % (ref 1–4)
HCT VFR BLD CALC: 40 % (ref 36.3–47.1)
HEMOGLOBIN: 12.9 G/DL (ref 11.9–15.1)
IMMATURE GRANULOCYTES: 0 %
LYMPHOCYTES # BLD: 25 % (ref 24–43)
MCH RBC QN AUTO: 29.1 PG (ref 25.2–33.5)
MCHC RBC AUTO-ENTMCNC: 32.3 G/DL (ref 28.4–34.8)
MCV RBC AUTO: 90.3 FL (ref 82.6–102.9)
MONOCYTES # BLD: 5 % (ref 3–12)
NRBC AUTOMATED: 0 PER 100 WBC
PDW BLD-RTO: 12.7 % (ref 11.8–14.4)
PLATELET # BLD: 272 K/UL (ref 138–453)
PLATELET ESTIMATE: ABNORMAL
PMV BLD AUTO: 12 FL (ref 8.1–13.5)
RBC # BLD: 4.43 M/UL (ref 3.95–5.11)
RBC # BLD: ABNORMAL 10*6/UL
SEG NEUTROPHILS: 69 % (ref 36–65)
SEGMENTED NEUTROPHILS ABSOLUTE COUNT: 6.24 K/UL (ref 1.5–8.1)
WBC # BLD: 9.1 K/UL (ref 3.5–11.3)
WBC # BLD: ABNORMAL 10*3/UL

## 2019-01-10 PROCEDURE — 83540 ASSAY OF IRON: CPT

## 2019-01-10 PROCEDURE — 82306 VITAMIN D 25 HYDROXY: CPT

## 2019-01-10 PROCEDURE — 82746 ASSAY OF FOLIC ACID SERUM: CPT

## 2019-01-10 PROCEDURE — 36415 COLL VENOUS BLD VENIPUNCTURE: CPT

## 2019-01-10 PROCEDURE — 85025 COMPLETE CBC W/AUTO DIFF WBC: CPT

## 2019-01-10 PROCEDURE — 84100 ASSAY OF PHOSPHORUS: CPT

## 2019-01-10 PROCEDURE — 83735 ASSAY OF MAGNESIUM: CPT

## 2019-01-10 PROCEDURE — 80053 COMPREHEN METABOLIC PANEL: CPT

## 2019-01-10 PROCEDURE — 82607 VITAMIN B-12: CPT

## 2019-01-10 PROCEDURE — 84425 ASSAY OF VITAMIN B-1: CPT

## 2019-01-11 LAB
ALBUMIN SERPL-MCNC: 4.2 G/DL (ref 3.5–5.2)
ALBUMIN/GLOBULIN RATIO: 1.4 (ref 1–2.5)
ALP BLD-CCNC: 63 U/L (ref 35–104)
ALT SERPL-CCNC: 7 U/L (ref 5–33)
ANION GAP SERPL CALCULATED.3IONS-SCNC: 15 MMOL/L (ref 9–17)
AST SERPL-CCNC: 9 U/L
BILIRUB SERPL-MCNC: 0.3 MG/DL (ref 0.3–1.2)
BUN BLDV-MCNC: 9 MG/DL (ref 6–20)
BUN/CREAT BLD: ABNORMAL (ref 9–20)
CALCIUM SERPL-MCNC: 9 MG/DL (ref 8.6–10.4)
CHLORIDE BLD-SCNC: 102 MMOL/L (ref 98–107)
CO2: 25 MMOL/L (ref 20–31)
CREAT SERPL-MCNC: 0.51 MG/DL (ref 0.5–0.9)
FOLATE: 5.7 NG/ML
GFR AFRICAN AMERICAN: >60 ML/MIN
GFR NON-AFRICAN AMERICAN: >60 ML/MIN
GFR SERPL CREATININE-BSD FRML MDRD: ABNORMAL ML/MIN/{1.73_M2}
GFR SERPL CREATININE-BSD FRML MDRD: ABNORMAL ML/MIN/{1.73_M2}
GLUCOSE BLD-MCNC: 147 MG/DL (ref 70–99)
IRON: 46 UG/DL (ref 37–145)
MAGNESIUM: 1.9 MG/DL (ref 1.6–2.6)
PHOSPHORUS: 3.3 MG/DL (ref 2.6–4.5)
POTASSIUM SERPL-SCNC: 3.9 MMOL/L (ref 3.7–5.3)
SODIUM BLD-SCNC: 142 MMOL/L (ref 135–144)
TOTAL PROTEIN: 7.2 G/DL (ref 6.4–8.3)
VITAMIN B-12: 705 PG/ML (ref 232–1245)
VITAMIN D 25-HYDROXY: 40.3 NG/ML (ref 30–100)

## 2019-01-15 LAB — VITAMIN B1 WHOLE BLOOD: 116 NMOL/L (ref 70–180)

## 2019-07-11 ENCOUNTER — HOSPITAL ENCOUNTER (OUTPATIENT)
Age: 39
Discharge: HOME | End: 2019-07-11
Payer: COMMERCIAL

## 2019-07-11 LAB
ESTIMATED AVERAGE GLUCOSE: 123 MG/DL
HBA1C MFR BLD: 5.9 % (ref 4–6)
THYROXINE, FREE: 2.25 NG/DL (ref 0.93–1.7)
TSH SERPL DL<=0.05 MIU/L-ACNC: 0.01 MIU/L (ref 0.3–5)
VITAMIN D 25-HYDROXY: 59.5 NG/ML (ref 30–100)

## 2019-07-11 PROCEDURE — 82306 VITAMIN D 25 HYDROXY: CPT

## 2019-07-11 PROCEDURE — 84439 ASSAY OF FREE THYROXINE: CPT

## 2019-07-11 PROCEDURE — 36415 COLL VENOUS BLD VENIPUNCTURE: CPT

## 2019-07-11 PROCEDURE — 83036 HEMOGLOBIN GLYCOSYLATED A1C: CPT

## 2019-07-11 PROCEDURE — 86800 THYROGLOBULIN ANTIBODY: CPT

## 2019-07-11 PROCEDURE — 84443 ASSAY THYROID STIM HORMONE: CPT

## 2019-07-11 PROCEDURE — 86376 MICROSOMAL ANTIBODY EACH: CPT

## 2019-07-12 LAB
THYROGLOBULIN AB: <20 IU/ML (ref 0–40)
THYROID PEROXIDASE (TPO) AB: 21.2 IU/ML (ref 0–35)

## 2019-09-12 ENCOUNTER — HOSPITAL ENCOUNTER (OUTPATIENT)
Age: 39
Discharge: HOME | End: 2019-09-12
Payer: COMMERCIAL

## 2019-09-12 LAB
THYROXINE, FREE: 2.45 NG/DL (ref 0.93–1.7)
TSH SERPL DL<=0.05 MIU/L-ACNC: <0.01 MIU/L (ref 0.3–5)
VITAMIN D 25-HYDROXY: 54.5 NG/ML (ref 30–100)

## 2019-09-12 PROCEDURE — 84443 ASSAY THYROID STIM HORMONE: CPT

## 2019-09-12 PROCEDURE — 84432 ASSAY OF THYROGLOBULIN: CPT

## 2019-09-12 PROCEDURE — 86800 THYROGLOBULIN ANTIBODY: CPT

## 2019-09-12 PROCEDURE — 36415 COLL VENOUS BLD VENIPUNCTURE: CPT

## 2019-09-12 PROCEDURE — 86376 MICROSOMAL ANTIBODY EACH: CPT

## 2019-09-12 PROCEDURE — 84439 ASSAY OF FREE THYROXINE: CPT

## 2019-09-12 PROCEDURE — 82306 VITAMIN D 25 HYDROXY: CPT

## 2019-09-13 LAB
THYROGLOBULIN AB: <20 IU/ML (ref 0–40)
THYROGLOBULIN: <0.2 NG/ML (ref 0–63.4)
THYROID PEROXIDASE (TPO) AB: 22.9 IU/ML (ref 0–35)

## 2019-11-20 ENCOUNTER — HOSPITAL ENCOUNTER (OUTPATIENT)
Age: 39
Discharge: HOME | End: 2019-11-20
Payer: COMMERCIAL

## 2019-11-20 LAB
THYROXINE, FREE: 1.79 NG/DL (ref 0.93–1.7)
TSH SERPL DL<=0.05 MIU/L-ACNC: 0.03 MIU/L (ref 0.3–5)

## 2019-11-20 PROCEDURE — 84439 ASSAY OF FREE THYROXINE: CPT

## 2019-11-20 PROCEDURE — 84443 ASSAY THYROID STIM HORMONE: CPT

## 2019-11-20 PROCEDURE — 36415 COLL VENOUS BLD VENIPUNCTURE: CPT

## 2020-01-13 ENCOUNTER — HOSPITAL ENCOUNTER (OUTPATIENT)
Age: 40
Discharge: HOME | End: 2020-01-13
Payer: COMMERCIAL

## 2020-01-13 LAB
ABSOLUTE EOS #: 0.2 K/UL (ref 0–0.44)
ABSOLUTE IMMATURE GRANULOCYTE: <0.03 K/UL (ref 0–0.3)
ABSOLUTE LYMPH #: 2.08 K/UL (ref 1.1–3.7)
ABSOLUTE MONO #: 0.47 K/UL (ref 0.1–1.2)
ALBUMIN SERPL-MCNC: 3.8 G/DL (ref 3.5–5.2)
ALBUMIN/GLOBULIN RATIO: 1.3 (ref 1–2.5)
ALP BLD-CCNC: 48 U/L (ref 35–104)
ALT SERPL-CCNC: 10 U/L (ref 5–33)
ANION GAP SERPL CALCULATED.3IONS-SCNC: 11 MMOL/L (ref 9–17)
AST SERPL-CCNC: 14 U/L
BASOPHILS # BLD: 1 % (ref 0–2)
BASOPHILS ABSOLUTE: 0.04 K/UL (ref 0–0.2)
BILIRUB SERPL-MCNC: 0.7 MG/DL (ref 0.3–1.2)
BUN BLDV-MCNC: 10 MG/DL (ref 6–20)
BUN/CREAT BLD: ABNORMAL (ref 9–20)
CALCIUM SERPL-MCNC: 8.5 MG/DL (ref 8.6–10.4)
CHLORIDE BLD-SCNC: 99 MMOL/L (ref 98–107)
CHOLESTEROL, FASTING: 138 MG/DL
CHOLESTEROL/HDL RATIO: 2.5
CO2: 25 MMOL/L (ref 20–31)
CREAT SERPL-MCNC: 0.49 MG/DL (ref 0.5–0.9)
DIFFERENTIAL TYPE: NORMAL
EOSINOPHILS RELATIVE PERCENT: 3 % (ref 1–4)
FOLATE: 10.4 NG/ML
GFR AFRICAN AMERICAN: >60 ML/MIN
GFR NON-AFRICAN AMERICAN: >60 ML/MIN
GFR SERPL CREATININE-BSD FRML MDRD: ABNORMAL ML/MIN/{1.73_M2}
GFR SERPL CREATININE-BSD FRML MDRD: ABNORMAL ML/MIN/{1.73_M2}
GLUCOSE FASTING: 150 MG/DL (ref 70–99)
HCT VFR BLD CALC: 38.7 % (ref 36.3–47.1)
HDLC SERPL-MCNC: 55 MG/DL
HEMOGLOBIN: 12.1 G/DL (ref 11.9–15.1)
IMMATURE GRANULOCYTES: 0 %
IRON SATURATION: 54 % (ref 20–55)
IRON: 189 UG/DL (ref 37–145)
IRON: 189 UG/DL (ref 37–145)
LDL CHOLESTEROL: 67 MG/DL (ref 0–130)
LYMPHOCYTES # BLD: 28 % (ref 24–43)
MAGNESIUM: 1.8 MG/DL (ref 1.6–2.6)
MCH RBC QN AUTO: 28.9 PG (ref 25.2–33.5)
MCHC RBC AUTO-ENTMCNC: 31.3 G/DL (ref 28.4–34.8)
MCV RBC AUTO: 92.6 FL (ref 82.6–102.9)
MONOCYTES # BLD: 6 % (ref 3–12)
NRBC AUTOMATED: 0 PER 100 WBC
PDW BLD-RTO: 13 % (ref 11.8–14.4)
PHOSPHORUS: 3.8 MG/DL (ref 2.6–4.5)
PLATELET # BLD: 276 K/UL (ref 138–453)
PLATELET ESTIMATE: NORMAL
PMV BLD AUTO: 11.7 FL (ref 8.1–13.5)
POTASSIUM SERPL-SCNC: 4.1 MMOL/L (ref 3.7–5.3)
RBC # BLD: 4.18 M/UL (ref 3.95–5.11)
RBC # BLD: NORMAL 10*6/UL
SEG NEUTROPHILS: 62 % (ref 36–65)
SEGMENTED NEUTROPHILS ABSOLUTE COUNT: 4.54 K/UL (ref 1.5–8.1)
SODIUM BLD-SCNC: 135 MMOL/L (ref 135–144)
THYROXINE, FREE: 1.96 NG/DL (ref 0.93–1.7)
TOTAL IRON BINDING CAPACITY: 352 UG/DL (ref 250–450)
TOTAL PROTEIN: 6.7 G/DL (ref 6.4–8.3)
TRIGLYCERIDE, FASTING: 79 MG/DL
TSH SERPL DL<=0.05 MIU/L-ACNC: 0.16 MIU/L (ref 0.3–5)
UNSATURATED IRON BINDING CAPACITY: 163 UG/DL (ref 112–347)
VITAMIN B-12: 647 PG/ML (ref 232–1245)
VITAMIN D 25-HYDROXY: 69.4 NG/ML (ref 30–100)
VITAMIN D 25-HYDROXY: 69.4 NG/ML (ref 30–100)
VLDLC SERPL CALC-MCNC: NORMAL MG/DL (ref 1–30)
WBC # BLD: 7.4 K/UL (ref 3.5–11.3)
WBC # BLD: NORMAL 10*3/UL

## 2020-01-13 PROCEDURE — 80061 LIPID PANEL: CPT

## 2020-01-13 PROCEDURE — 85025 COMPLETE CBC W/AUTO DIFF WBC: CPT

## 2020-01-13 PROCEDURE — 84443 ASSAY THYROID STIM HORMONE: CPT

## 2020-01-13 PROCEDURE — 82306 VITAMIN D 25 HYDROXY: CPT

## 2020-01-13 PROCEDURE — 84425 ASSAY OF VITAMIN B-1: CPT

## 2020-01-13 PROCEDURE — 83540 ASSAY OF IRON: CPT

## 2020-01-13 PROCEDURE — 82746 ASSAY OF FOLIC ACID SERUM: CPT

## 2020-01-13 PROCEDURE — 84439 ASSAY OF FREE THYROXINE: CPT

## 2020-01-13 PROCEDURE — 82607 VITAMIN B-12: CPT

## 2020-01-13 PROCEDURE — 36415 COLL VENOUS BLD VENIPUNCTURE: CPT

## 2020-01-13 PROCEDURE — 80053 COMPREHEN METABOLIC PANEL: CPT

## 2020-01-13 PROCEDURE — 83735 ASSAY OF MAGNESIUM: CPT

## 2020-01-13 PROCEDURE — 84100 ASSAY OF PHOSPHORUS: CPT

## 2020-01-13 PROCEDURE — 83550 IRON BINDING TEST: CPT

## 2020-01-18 LAB — VITAMIN B1 WHOLE BLOOD: 131 NMOL/L (ref 70–180)

## 2020-03-04 ENCOUNTER — HOSPITAL ENCOUNTER (OUTPATIENT)
Age: 40
Discharge: HOME | End: 2020-03-04
Payer: COMMERCIAL

## 2020-03-04 LAB
IRON SATURATION: 38 % (ref 20–55)
IRON: 137 UG/DL (ref 37–145)
THYROXINE, FREE: 1.86 NG/DL (ref 0.93–1.7)
TOTAL IRON BINDING CAPACITY: 364 UG/DL (ref 250–450)
TSH SERPL DL<=0.05 MIU/L-ACNC: 0.65 MIU/L (ref 0.3–5)
UNSATURATED IRON BINDING CAPACITY: 227 UG/DL (ref 112–347)
VITAMIN D 25-HYDROXY: 56.5 NG/ML (ref 30–100)

## 2020-03-04 PROCEDURE — 83540 ASSAY OF IRON: CPT

## 2020-03-04 PROCEDURE — 83550 IRON BINDING TEST: CPT

## 2020-03-04 PROCEDURE — 84443 ASSAY THYROID STIM HORMONE: CPT

## 2020-03-04 PROCEDURE — 82306 VITAMIN D 25 HYDROXY: CPT

## 2020-03-04 PROCEDURE — 84439 ASSAY OF FREE THYROXINE: CPT

## 2020-03-04 PROCEDURE — 36415 COLL VENOUS BLD VENIPUNCTURE: CPT

## 2020-04-01 ENCOUNTER — HOSPITAL ENCOUNTER (OUTPATIENT)
Age: 40
Setting detail: SPECIMEN
Discharge: HOME | End: 2020-04-01
Payer: COMMERCIAL

## 2020-04-01 LAB
T3 FREE: 3.18 PG/ML (ref 2.02–4.43)
THYROXINE, FREE: 1.72 NG/DL (ref 0.93–1.7)
TSH SERPL DL<=0.05 MIU/L-ACNC: 0.44 MIU/L (ref 0.3–5)

## 2020-09-14 ENCOUNTER — HOSPITAL ENCOUNTER (EMERGENCY)
Age: 40
Discharge: HOME | End: 2020-09-14
Attending: EMERGENCY MEDICINE
Payer: COMMERCIAL

## 2020-09-14 VITALS
BODY MASS INDEX: 23.66 KG/M2 | TEMPERATURE: 98.1 F | OXYGEN SATURATION: 98 % | SYSTOLIC BLOOD PRESSURE: 125 MMHG | HEIGHT: 65 IN | DIASTOLIC BLOOD PRESSURE: 76 MMHG | HEART RATE: 80 BPM | WEIGHT: 142 LBS | RESPIRATION RATE: 16 BRPM

## 2020-09-14 LAB
BILIRUBIN URINE: NEGATIVE
COLOR: YELLOW
COMMENT UA: ABNORMAL
GLUCOSE URINE: ABNORMAL
HCG(URINE) PREGNANCY TEST: NEGATIVE
KETONES, URINE: NEGATIVE
LEUKOCYTE ESTERASE, URINE: NEGATIVE
NITRITE, URINE: NEGATIVE
PH UA: 5 (ref 5–8)
PROTEIN UA: NEGATIVE
SPECIFIC GRAVITY UA: 1.02 (ref 1–1.03)
TURBIDITY: CLEAR
URINE HGB: NEGATIVE
UROBILINOGEN, URINE: NORMAL

## 2020-09-14 PROCEDURE — 99283 EMERGENCY DEPT VISIT LOW MDM: CPT

## 2020-09-14 PROCEDURE — 81025 URINE PREGNANCY TEST: CPT

## 2020-09-14 PROCEDURE — 81003 URINALYSIS AUTO W/O SCOPE: CPT

## 2020-09-14 RX ORDER — NITROFURANTOIN 25; 75 MG/1; MG/1
100 CAPSULE ORAL 2 TIMES DAILY
Qty: 14 CAPSULE | Refills: 0 | Status: SHIPPED | OUTPATIENT
Start: 2020-09-14 | End: 2020-09-21

## 2020-09-14 RX ORDER — PHENAZOPYRIDINE HYDROCHLORIDE 200 MG/1
200 TABLET, FILM COATED ORAL 3 TIMES DAILY PRN
Qty: 6 TABLET | Refills: 0 | Status: SHIPPED | OUTPATIENT
Start: 2020-09-14 | End: 2020-09-17

## 2020-09-14 ASSESSMENT — PAIN DESCRIPTION - LOCATION: LOCATION: BACK

## 2020-09-14 ASSESSMENT — PAIN DESCRIPTION - DESCRIPTORS: DESCRIPTORS: ACHING

## 2020-09-14 ASSESSMENT — PAIN DESCRIPTION - PAIN TYPE: TYPE: ACUTE PAIN

## 2020-09-14 ASSESSMENT — PAIN DESCRIPTION - ORIENTATION: ORIENTATION: LOWER

## 2020-09-14 ASSESSMENT — PAIN SCALES - GENERAL: PAINLEVEL_OUTOF10: 7

## 2020-09-14 ASSESSMENT — PAIN DESCRIPTION - FREQUENCY: FREQUENCY: CONTINUOUS

## 2020-09-14 NOTE — ED PROVIDER NOTES
SPRAY    1 spray by Nasal route daily    LIOTHYRONINE (CYTOMEL) 5 MCG TABLET    Take 5 mcg by mouth 2 times daily    METFORMIN (GLUCOPHAGE) 500 MG TABLET    Take 500 mg by mouth 3 times daily    NORGESTIM-ETH ESTRAD TRIPHASIC (TRI-PREVIFEM) 0.18/0.215/0.25 MG-35 MCG TABS    Take 1 tablet by mouth daily    VITAMIN D (CHOLECALCIFEROL) 5000 UNITS CAPS CAPSULE    Take 5,000 Units by mouth daily       ALLERGIES     has No Known Allergies. FAMILY HISTORY     She indicated that her mother is alive. She indicated that her father is alive. She indicated that her brother is alive. She indicated that her maternal grandmother is . She indicated that her maternal grandfather is . She indicated that her paternal grandmother is alive. She indicated that her paternal grandfather is . family history includes Arrhythmia in her father; Arthritis in her maternal grandfather and maternal grandmother; Cancer in her maternal grandfather, maternal grandmother, and paternal grandfather; Diabetes in her father; Heart Attack in her father; Heart Disease in her father; Heart Failure in her maternal grandmother; High Blood Pressure in her paternal grandmother; Neuropathy in her maternal grandmother; Other in her mother; Parkinsonism in her maternal grandfather; Prostate Cancer in her paternal grandfather; Stroke in her father. SOCIAL HISTORY      reports that she has never smoked. She has never used smokeless tobacco. She reports that she does not drink alcohol or use drugs.     PHYSICAL EXAM       ED Triage Vitals [20 1652]   BP Temp Temp Source Pulse Resp SpO2 Height Weight   125/76 98.1 °F (36.7 °C) Temporal 80 16 98 % 5' 5\" (1.651 m) 142 lb (64.4 kg)       Constitutional: Alert, oriented x3, nontoxic, answering questions appropriately, acting properly for age, in no acute distress   HEENT: Extraocular muscles intact, mucus membranes moist,   Neck: Trachea midline   Cardiovascular: Regular rhythm and rate no S3, S4, or murmurs   Respiratory: Clear to auscultation bilaterally no wheezes, rhonchi, rales, no respiratory distress no tachypnea no retractions no hypoxia  Gastrointestinal: Soft, nontender, nondistended, positive bowel sounds. No rebound, rigidity, or guarding. No right lower quadrant or left lower quadrant tenderness to deep palpation  Musculoskeletal: No extremity pain or swelling   Neurologic: Moving all 4 extremities without difficulty there are no gross focal neurologic deficits   Skin: Warm and dry       DIFFERENTIAL DIAGNOSIS/ MDM:     Suspect persistent UTI. Check urinalysis and pregnancy test.    DIAGNOSTIC RESULTS     EKG: All EKG's are interpreted by the Emergency Department Physician who either signs or Co-signs this chart in the absence of a cardiologist.        Not indicated unless otherwise documented above    LABS:  Results for orders placed or performed during the hospital encounter of 09/14/20   Urinalysis Reflex to Culture    Specimen: Urine, clean catch   Result Value Ref Range    Color, UA YELLOW YELLOW    Turbidity UA CLEAR CLEAR    Glucose, Ur 1+ (A) NEGATIVE    Bilirubin Urine NEGATIVE NEGATIVE    Ketones, Urine NEGATIVE NEGATIVE    Specific Gravity, UA 1.025 1.005 - 1.030    Urine Hgb NEGATIVE NEGATIVE    pH, UA 5.0 5.0 - 8.0    Protein, UA NEGATIVE NEGATIVE    Urobilinogen, Urine Normal Normal    Nitrite, Urine NEGATIVE NEGATIVE    Leukocyte Esterase, Urine NEGATIVE NEGATIVE    Urinalysis Comments       Microscopic exam not performed based on chemical results unless requested in original order. Urinalysis Comments          Urinalysis Comments       Utilizing a urinalysis as the only screening method to exclude a potential uropathogen can be unreliable in many patient populations. Rapid screening tests are less sensitive than culture and if UTI is a clinical possibility, culture should be considered despite a negative urinalysis.    Pregnancy, Urine   Result Value Ref Range    HCG(Urine) Pregnancy Test NEGATIVE NEGATIVE       Not indicated unless otherwise documented above    RADIOLOGY:   I reviewed the radiologist interpretations:    No orders to display       Not indicated unless otherwise documented above    EMERGENCY DEPARTMENT COURSE:     The patient was given the following medications:  Orders Placed This Encounter   Medications    nitrofurantoin, macrocrystal-monohydrate, (MACROBID) 100 MG capsule     Sig: Take 1 capsule by mouth 2 times daily for 7 days     Dispense:  14 capsule     Refill:  0    phenazopyridine (PYRIDIUM) 200 MG tablet     Sig: Take 1 tablet by mouth 3 times daily as needed for Pain (bladder spasm/pain)     Dispense:  6 tablet     Refill:  0        Vitals:   -------------------------  /76   Pulse 80   Temp 98.1 °F (36.7 °C) (Temporal)   Resp 16   Ht 5' 5\" (1.651 m)   Wt 64.4 kg (142 lb)   SpO2 98%   BMI 23.63 kg/m²     5:05 PM urinalysis and urine pregnancy negative. Despite being on antibiotics still has persistent dysuria with urgency and now some low back pain will change to Macrobid advised to stop Keflex. Follow-up with family physician increase fluids as tolerated prescription for Pyridium as well. Return if worsening symptoms or other concerns. I have reviewed the disposition diagnosis with the patient and or their family/guardian. I have answered their questions and given discharge instructions. They voiced understanding of these instructions and did not have any furtherquestions or complaints. CRITICAL CARE:    None    CONSULTS:    None    PROCEDURES:    None      O[facility] Report if indicated             FINAL IMPRESSION      1. Dysuria          DISPOSITION/PLAN   DISPOSITION Decision To Discharge 09/14/2020 05:08:24 PM        CONDITION ON DISPOSITION: STABLE       PATIENT REFERRED TO:  Ying Lozano MD  [address]  [address] [address]    Schedule an appointment as soon as possible for a visit in 2 days        DISCHARGE MEDICATIONS:  New Prescriptions    NITROFURANTOIN, MACROCRYSTAL-MONOHYDRATE, (MACROBID) 100 MG CAPSULE    Take 1 capsule by mouth 2 times daily for 7 days    PHENAZOPYRIDINE (PYRIDIUM) 200 MG TABLET    Take 1 tablet by mouth 3 times daily as needed for Pain (bladder spasm/pain)       (Please note that portions of thisnote were completed with a voice recognition program.  Efforts were made to edit the dictations but occasionally words are mis-transcribed.)    Singh DO  Attending Emergency Physician        Maria Elena Encinas DO  09/14/20 4911

## 2020-09-14 NOTE — ED NOTES
Pt. Greeted in room. Pt. States she has been on keflex since Thursday for a UTI. Pt. Natalya Pu now she is having bilateral flank pain. Pt. Denies fever, chills, or nausea. Pt. Alert and oriented x4. RR equal and non labored. NAD noted. Urine sent to lab.       Za Cooper RN  09/14/20 8573

## 2020-12-18 ENCOUNTER — HOSPITAL ENCOUNTER (EMERGENCY)
Age: 40
Discharge: HOME | End: 2020-12-18
Attending: EMERGENCY MEDICINE
Payer: COMMERCIAL

## 2020-12-18 ENCOUNTER — APPOINTMENT (OUTPATIENT)
Dept: GENERAL RADIOLOGY | Age: 40
End: 2020-12-18
Payer: COMMERCIAL

## 2020-12-18 VITALS
RESPIRATION RATE: 16 BRPM | BODY MASS INDEX: 23.66 KG/M2 | HEIGHT: 65 IN | SYSTOLIC BLOOD PRESSURE: 103 MMHG | TEMPERATURE: 98.6 F | WEIGHT: 142 LBS | DIASTOLIC BLOOD PRESSURE: 75 MMHG | HEART RATE: 90 BPM | OXYGEN SATURATION: 99 %

## 2020-12-18 PROCEDURE — 6360000002 HC RX W HCPCS: Performed by: PHYSICIAN ASSISTANT

## 2020-12-18 PROCEDURE — 96372 THER/PROPH/DIAG INJ SC/IM: CPT

## 2020-12-18 PROCEDURE — 99282 EMERGENCY DEPT VISIT SF MDM: CPT

## 2020-12-18 PROCEDURE — 72100 X-RAY EXAM L-S SPINE 2/3 VWS: CPT

## 2020-12-18 RX ORDER — PREDNISONE 50 MG/1
50 TABLET ORAL DAILY
Qty: 5 TABLET | Refills: 0 | Status: SHIPPED | OUTPATIENT
Start: 2020-12-18 | End: 2020-12-23

## 2020-12-18 RX ORDER — DEXAMETHASONE SODIUM PHOSPHATE 10 MG/ML
8 INJECTION INTRAMUSCULAR; INTRAVENOUS ONCE
Status: COMPLETED | OUTPATIENT
Start: 2020-12-18 | End: 2020-12-18

## 2020-12-18 RX ORDER — CYCLOBENZAPRINE HCL 10 MG
10 TABLET ORAL NIGHTLY PRN
Qty: 10 TABLET | Refills: 0 | Status: SHIPPED | OUTPATIENT
Start: 2020-12-18 | End: 2020-12-28

## 2020-12-18 RX ADMIN — DEXAMETHASONE SODIUM PHOSPHATE 8 MG: 10 INJECTION INTRAMUSCULAR; INTRAVENOUS at 14:09

## 2020-12-18 ASSESSMENT — PAIN SCALES - GENERAL: PAINLEVEL_OUTOF10: 9

## 2020-12-18 NOTE — ED PROVIDER NOTES
[id number] [facility] ED  [address] [facility] [address] [facility] [address]  Phone: [phone]  Fax: [phone]        Pt Name: Daniel Garcia  MRN: [id number]  [place] 1980  Date of evaluation: 20    CHIEFCOMPLAINT       Chief Complaint   Patient presents with    Back Pain     x 1 week       HISTORY OF PRESENT ILLNESS (Location/Symptom, Timing/Onset, Context/Setting, Quality, Duration, Modifying Factors, Severity)      Ronnie Morales is a 38 y.o. female with no pertinent PMH who presents to the ED via private auto with back pain. Patient states that for the past 1 week she has been experiencing diffuse lower back pain that radiates into her right posterior thigh occasionally extending all the way to her ankle. Denies history of similar symptoms. Denies any known injury or trauma or recent heavy lifting. She has exacerbation of the pain when ambulating and when doing certain movements. She has alleviation when laying on her left side. Patient has been taking Tylenol and ibuprofen at home as well as icing the area and massaging the area without much improvement. Denies any saddle anesthesia, incontinence of bowel/bladder, IV drug use, nausea, vomiting, fever, chills, weakness, paresthesias, headache, vision changes, dizziness, lightheadedness, syncope, abdominal pain, chest pain, shortness of breath, or any other concerns at this time. PAST MEDICAL / SURGICAL / SOCIAL / FAMILY HISTORY     PMH:  has a past medical history of Asthma and Cancer ([address] Surgical History:  has a past surgical history that includes  section. Social History:  reports that she has never smoked. She has never used smokeless tobacco. She reports that she does not drink alcohol or use drugs. Family History: She indicated that her mother is alive. She indicated that her father is alive. She indicated that her brother is alive. She indicated that her maternal grandmother is .  She indicated that her maternal grandfather is . She indicated that her paternal grandmother is alive. She indicated that her paternal grandfather is . family history includes Arrhythmia in her father; Arthritis in her maternal grandfather and maternal grandmother; Cancer in her maternal grandfather, maternal grandmother, and paternal grandfather; Diabetes in her father; Heart Attack in her father; Heart Disease in her father; Heart Failure in her maternal grandmother; High Blood Pressure in her paternal grandmother; Neuropathy in her maternal grandmother; Other in her mother; Parkinsonism in her maternal grandfather; Prostate Cancer in her paternal grandfather; Stroke in her father. Psychiatric History: None    Allergies: Patient has no known allergies. Home Medications:   Prior to Admission medications    Medication Sig Start Date End Date Taking?  Authorizing Provider   predniSONE (DELTASONE) 50 MG tablet Take 1 tablet by mouth daily for 5 days 20 Yes Vangie Galvez PA-C   cyclobenzaprine (FLEXERIL) 10 MG tablet Take 1 tablet by mouth nightly as needed for Muscle spasms 20 Yes Yoni Joyce PA-C   metFORMIN (GLUCOPHAGE) 500 MG tablet Take 500 mg by mouth 3 times daily    Historical Provider, MD   amoxicillin (AMOXIL) 500 MG capsule Take 1 capsule by mouth 3 times daily 18   Milton Weber MD   fluticasone [facility]) 50 MCG/ACT nasal spray 1 spray by Nasal route daily 17   JOHN Castelan - CNP   liothyronine (CYTOMEL) 5 MCG tablet Take 5 mcg by mouth 2 times daily    Historical Provider, MD   Cetirizine HCl (ZYRTEC ALLERGY PO) Take by mouth    Historical Provider, MD   B Complex-Folic Acid (F-821 BALANCED TR PO) Take 1 tablet by mouth 3 times daily (before meals)    Historical Provider, MD   vitamin D (CHOLECALCIFEROL) 5000 UNITS CAPS capsule Take 5,000 Units by mouth daily    Historical Provider, MD   Calcium-Magnesium-Vitamin D [phone] MG-MG-UNIT TABS Take 1 tablet by mouth 3 times daily (before meals)    Historical Provider, MD   Norgestim-Eth Estrad Triphasic (TRI-PREVIFEM) 0.18/0.215/0.25 MG-35 MCG TABS Take 1 tablet by mouth daily    Historical Provider, MD       REVIEW OF SYSTEMS  (2-9 systems for level 4, 10 ormore for level 5)      Review of Systems    Constitutional: See HPI. Eyes: See HPI. HENT: Denies sore throat or neck pain. Respiratory: See HPI. Cardiovascular: See HPI. GI: See HPI. : See HPI. Musculoskeletal: See HPI. Skin: Denies new rashes or wounds. Neurologic:  See HPI. Psychiatric: Denies sleep disturbances. Endocrine:  Denies unexpected weight loss. All other systems negative except as marked. PHYSICAL EXAM  (up to 7 for level 4, 8 or more for level 5)      INITIAL VITALS:  height is 5' 5\" (1.651 m) and weight is 64.4 kg (142 lb). Her oral temperature is 98.6 °F (37 °C). Her blood pressure is 103/75 and her pulse is 90. Her respiration is 16 and oxygen saturation is 99%. Vital signs reviewed. Physical Exam    General:  Alert, cooperative, well-groomed, well-nourished, appears stated age, and is in no acute distress. Head:  Normocephalic, atraumatic, and without obvious abnormality. Eyes:  Sclerae/conjunctivae clear without injection, pallor, or icterus. Corneas clear without opacities. EOM's intact. ENT: Ears and nose are all without obvious masses lesion or deformity. No oropharynx examination performed due to aerosolization risk during COVID-19 pandemic. Neck: Supple and symmetrical. Trachea midline. No adenopathy. Lungs:   No respiratory distress. CTA bilaterally. No wheezes, rhonchi, or rales. Heart:  Regular rate. Regular rhythm. No murmurs, rubs, or gallops. Abdomen:   Normoactive bowel sounds. Soft, nontender, nondistended without guarding or rebound. No palpable masses. No CVA tenderness. Musculoskeletal: The lumbar region is normal in appearance with minimal TTP over the right SI joint. No midline tenderness of the spine or step-off. Good ROM of the spine. No deformities, ecchymosis, edema, erythema, warmth, abrasions, or lacerations to the area. No other signs of trauma. No tenderness to palpation of the thoracic region, cervical region, or BLE. Strength of the BLE are 5/5. Sensation intact to light touch bilaterally. No foot drop. Positive straight leg and opposite leg raise. DP pulses 2+. Cap refill < 2 seconds. Extremities: Warm and dry without erythema or edema. No venous stasis changes. Skin: Soft, good turgor, and well-hydrated. No obvious rashes or lesions. Neurologic: GCS is 15 and no focal deficits are appreciated. Normal gait. Grossly normal motor and sensation. Speech clear. Psychiatric: Normal mood and affect. Normal behavior. Coherent thought process. DIFFERENTIAL DIAGNOSIS / MDM     The patient presents with low back pain that radiates into his right lower extremity consistent with sciatica without mechanism of injury. Vital signs are unremarkable. No skin lesions were seen. Pulses are 2/4 and motor is 5/5, bilaterally. The patient is neurovascularly intact and sensation intact. Symptoms consistent with sciatica, however, she has never had any imaging and will obtain an XR and treat patient with dexamethasone and reassess. No Flexeril as patient drove.     PLAN (LABS / IMAGING / EKG):  Orders Placed This Encounter   Procedures    XR LUMBAR SPINE (2-3 VIEWS)       MEDICATIONS ORDERED:  Orders Placed This Encounter   Medications    dexamethasone (DECADRON) injection 8 mg    predniSONE (DELTASONE) 50 MG tablet     Sig: Take 1 tablet by mouth daily for 5 days     Dispense:  5 tablet     Refill:  0    cyclobenzaprine (FLEXERIL) 10 MG tablet     Sig: Take 1 tablet by mouth nightly as needed for Muscle spasms     Dispense:  10 tablet     Refill:  0       Controlled Substances Monitoring:     DIAGNOSTIC RESULTS     RADIOLOGY:  Non-plain film images such as CT, Ultrasound and MRI are read by the radiologist. Plain radiographic images are visualized by me and the following radiologist interpretations are reviewed. Xr Lumbar Spine (2-3 Views)    Result Date: 12/18/2020  EXAMINATION: THREE XRAY VIEWS OF THE LUMBAR SPINE 12/18/2020 1:41 pm COMPARISON: None. HISTORY: ORDERING SYSTEM PROVIDED HISTORY: Low back pain with radiation into right leg TECHNOLOGIST PROVIDED HISTORY: Low back pain with radiation into right leg Reason for Exam: low back pain x 1week no known injury Acuity: Acute Type of Exam: Initial FINDINGS: 5 non-rib-bearing lumbar type vertebral bodies are present. There is no acute fracture or suspect osseous lesion. Vertebral body heights are maintained. Alignment is normal.  There is moderate L4-5 disc height loss with anterior endplate spurring and endplate sclerosis. No significant facet or sacroiliac arthropathy is evident. There are multiple abdominal surgical clips of bowel suture in the left upper quadrant. 1. No acute osseous abnormality of the lumbar spine. 2. Moderate L4-5 degenerative disc disease. LABS:  No results found for this visit on 12/18/20. Sofie [address] COURSE     ED Course as of Dec 18 1528   Fri Dec 18, 2020   1408 Patient was updated regarding the results of her lumbar spine which demonstrated DDD, but no acute abnormalities. We will give her supportive care instructions, a prescription for prednisone and Flexeril, and she was advised to follow-up with her primary care provider, was also given the contact information for Ortho. We discussed concerning signs symptoms that warrant emergent return. Patient verbalized understanding.     [GM]      ED Course User Index  [GM] Vangie Galvez PA-C        Vitals:    Vitals:    12/18/20 1311 12/18/20 1313   BP:  103/75   Pulse: 90    Resp: 16    Temp: 98.6 °F (37 °C)    TempSrc: Oral    SpO2: 99%    Weight: 64.4 kg (142 lb)    Height: 5' 5\" (1.651 m) -------------------------  BP: 103/75, Temp: 98.6 °F (37 °C), Pulse: 90, Resp: 16      RE-EVALUATION:  See ED Course notes above. The patient and/or family and I have discussed the diagnosis and risks, and we agree with discharging home to follow-up with their primary doctor. The patient appears stable for discharge and has been instructed to return immediately for new concerning symptoms, if the symptoms worsen in any way, or in 8-12 hours if not improved for re-evaluation. We have discussed the symptoms which are most concerning (e.g., fever, changing or worsening pain, persistent vomiting, bloody stools, chest pain, shortness of breath, numbness or weakness to the arms or legs, coolness or color change to the arms or legs) that necessitate immediate return. The patient understands that at this time there is no evidence for a more malignant underlying process, but the patient also understands that early in the process of an illness or injury, an emergency department workup can be falsely reassuring. Routine discharge counseling was given, and the patient understands that worsening, changing or persistent symptoms should prompt an immediate call or follow up with their primary physician or return to the emergency department. The importance of appropriate follow up was also discussed. I have reviewed the disposition diagnosis with the patient and or their family/guardian. I have answered their questions and given discharge instructions. They voiced understanding of these instructions and did not have any further questions or complaints. This patient was seen by the attending physician and they agreed with the assessment and plan. CONSULTS:  None    PROCEDURES:  None    FINAL IMPRESSION      1. Right sided sciatica          DISPOSITION / PLAN     CONDITION ON DISPOSITION:   Good / Stable for discharge.      PATIENT REFERRED TO:  Marifer Morales MD  [address] [place] [address] [address]       Sandy Schaeffer MD  [address]  [facility] [address]    Call in 3 days  To schedule appointment for follow-up      DISCHARGE MEDICATIONS:  Discharge Medication List as of 12/18/2020  2:11 PM      START taking these medications    Details   predniSONE (DELTASONE) 50 MG tablet Take 1 tablet by mouth daily for 5 days, Disp-5 tablet, R-0Print      cyclobenzaprine (FLEXERIL) 10 MG tablet Take 1 tablet by mouth nightly as needed for Muscle spasms, Disp-10 tablet, R-0Print             María Prescott PA-C   Emergency Medicine Physician Assistant    (Please note that portions of this note were completed with a voice recognition program.  Efforts were made to edit the dictations but occasionally words aremis-transcribed.)        Alex Bustamante PA-C  12/18/20 [id number]

## 2021-01-21 ENCOUNTER — OFFICE VISIT (OUTPATIENT)
Dept: ORTHOPEDIC SURGERY | Age: 41
End: 2021-01-21
Payer: COMMERCIAL

## 2021-01-21 VITALS — HEIGHT: 65 IN | WEIGHT: 140 LBS | HEART RATE: 82 BPM | BODY MASS INDEX: 23.32 KG/M2 | TEMPERATURE: 98 F

## 2021-01-21 DIAGNOSIS — G89.29 CHRONIC LOW BACK PAIN, UNSPECIFIED BACK PAIN LATERALITY, UNSPECIFIED WHETHER SCIATICA PRESENT: Primary | ICD-10-CM

## 2021-01-21 DIAGNOSIS — M54.50 CHRONIC LOW BACK PAIN, UNSPECIFIED BACK PAIN LATERALITY, UNSPECIFIED WHETHER SCIATICA PRESENT: Primary | ICD-10-CM

## 2021-01-21 DIAGNOSIS — M54.10 RADICULAR LEG PAIN: ICD-10-CM

## 2021-01-21 DIAGNOSIS — M51.36 DDD (DEGENERATIVE DISC DISEASE), LUMBAR: ICD-10-CM

## 2021-01-21 PROCEDURE — 1036F TOBACCO NON-USER: CPT | Performed by: ORTHOPAEDIC SURGERY

## 2021-01-21 PROCEDURE — G8484 FLU IMMUNIZE NO ADMIN: HCPCS | Performed by: ORTHOPAEDIC SURGERY

## 2021-01-21 PROCEDURE — G8420 CALC BMI NORM PARAMETERS: HCPCS | Performed by: ORTHOPAEDIC SURGERY

## 2021-01-21 PROCEDURE — 99203 OFFICE O/P NEW LOW 30 MIN: CPT | Performed by: ORTHOPAEDIC SURGERY

## 2021-01-21 PROCEDURE — G8427 DOCREV CUR MEDS BY ELIG CLIN: HCPCS | Performed by: ORTHOPAEDIC SURGERY

## 2021-01-21 NOTE — PROGRESS NOTES
Patient ID: Merry Peabody is a 38 y.o. female. Chief Complaint   Patient presents with    Established New Doctor    Pain     low back, down right leg         HPI    Patient presents with low back and L5 right radicular leg pain. Onset of symptoms was 7 weeks ago. Patient was seen in the emergency department 6 weeks ago given home exercises and stretching instructions which the patient has continued to do since that time. Patient describes some subjective weakness in the right leg but mostly and occasionally will feel weak. Patient is likewise been taking Tylenol and Advil. Past Medical History:   Diagnosis Date    Asthma     Cancer [facility])     Thyroid     Past Surgical History:   Procedure Laterality Date     SECTION       Family History   Problem Relation Age of Onset    Other Mother         MTHFR Mutation    Diabetes Father         IDDM    Stroke Father     Heart Disease Father     Heart Attack Father     Arrhythmia Father     Arthritis Maternal Grandmother     Cancer Maternal Grandmother     Heart Failure Maternal Grandmother     Neuropathy Maternal Grandmother     Parkinsonism Maternal Grandfather     Arthritis Maternal Grandfather     Cancer Maternal Grandfather     High Blood Pressure Paternal Grandmother     Prostate Cancer Paternal Grandfather     Cancer Paternal Grandfather      Social History     Occupational History    Not on file   Tobacco Use    Smoking status: Never Smoker    Smokeless tobacco: Never Used   Substance and Sexual Activity    Alcohol use: No    Drug use: No    Sexual activity: Yes     Partners: Male        Review of Systems   All other systems reviewed and are negative. Physical Exam  Vitals signs and nursing note reviewed. Constitutional:       Appearance: She is well-developed. HENT:      Head: Normocephalic and atraumatic.       Nose: Nose normal.   Eyes:      Conjunctiva/sclera: Conjunctivae normal.   Neck: Musculoskeletal: Normal range of motion and neck supple. Pulmonary:      Effort: Pulmonary effort is normal. No respiratory distress. Musculoskeletal:      Comments: Normal gait     Skin:     General: Skin is warm and dry. Neurological:      Mental Status: She is alert and oriented to person, place, and time. Sensory: No sensory deficit. Psychiatric:         Behavior: Behavior normal.         Thought Content: Thought content normal.     On physical exam patient walks with a normal gait she does have a very positive straight leg raise on the right with radiation to the calf straight leg raise on the left is completely negative. Patient with a normal gait    Patient able to heel and toe walk    Diagnostic x-rays [facility] reviewed AP lateral lumbar spine normal with the exception of advanced to space collapse marginal osteophytes with ossification of part of the disc at L4-5    Assessment:          1. Chronic low back pain, unspecified back pain laterality, unspecified whether sciatica present    2. Radicular leg pain    3. DDD (degenerative disc disease), lumbar      Advanced L4-5 degenerative disc disease with low back pain right L5 radicular leg pain present for greater than 7 weeks failure of Tylenol Advil a home stretching exercise program which the patient is done daily since she was seen in the emergency department 6 weeks ago      Plan:     MRI lumbar spine    Follow-up    No orders of the defined types were placed in this encounter. Kishor Alvarez MD    Please note that this chart was generated using voicerecognition Dragon dictation software. Although every effort was made to ensurethe accuracy of this automated transcription, some errors in transcription may haveoccurred.

## 2021-01-29 ENCOUNTER — HOSPITAL ENCOUNTER (OUTPATIENT)
Dept: MRI IMAGING | Age: 41
Discharge: HOME | End: 2021-01-31
Payer: COMMERCIAL

## 2021-01-29 ENCOUNTER — TELEPHONE (OUTPATIENT)
Dept: ORTHOPEDIC SURGERY | Age: 41
End: 2021-01-29

## 2021-01-29 DIAGNOSIS — G89.29 CHRONIC LOW BACK PAIN, UNSPECIFIED BACK PAIN LATERALITY, UNSPECIFIED WHETHER SCIATICA PRESENT: ICD-10-CM

## 2021-01-29 DIAGNOSIS — M54.10 RADICULAR LEG PAIN: ICD-10-CM

## 2021-01-29 DIAGNOSIS — M54.50 CHRONIC LOW BACK PAIN, UNSPECIFIED BACK PAIN LATERALITY, UNSPECIFIED WHETHER SCIATICA PRESENT: ICD-10-CM

## 2021-01-29 DIAGNOSIS — M51.36 DDD (DEGENERATIVE DISC DISEASE), LUMBAR: ICD-10-CM

## 2021-01-29 PROCEDURE — 72148 MRI LUMBAR SPINE W/O DYE: CPT

## 2021-01-29 NOTE — TELEPHONE ENCOUNTER
Called patient to see if she wanted to move up appointment to next week for MRI results.      No answer LM

## 2021-02-03 ENCOUNTER — HOSPITAL ENCOUNTER (OUTPATIENT)
Age: 41
Discharge: HOME | End: 2021-02-03
Payer: COMMERCIAL

## 2021-02-03 LAB
THYROXINE, FREE: 1.64 NG/DL (ref 0.93–1.7)
TSH SERPL DL<=0.05 MIU/L-ACNC: 0.14 MIU/L (ref 0.3–5)

## 2021-02-03 PROCEDURE — 84443 ASSAY THYROID STIM HORMONE: CPT

## 2021-02-03 PROCEDURE — 84439 ASSAY OF FREE THYROXINE: CPT

## 2021-02-03 PROCEDURE — 36415 COLL VENOUS BLD VENIPUNCTURE: CPT

## 2021-02-03 PROCEDURE — 86800 THYROGLOBULIN ANTIBODY: CPT

## 2021-02-03 PROCEDURE — 84432 ASSAY OF THYROGLOBULIN: CPT

## 2021-02-04 LAB — THYROGLOBULIN: <0.2 NG/ML (ref 0–63.4)

## 2021-02-05 LAB — THYROGLOBULIN AB: 30 IU/ML (ref 0–40)

## 2021-02-18 ENCOUNTER — OFFICE VISIT (OUTPATIENT)
Dept: ORTHOPEDIC SURGERY | Age: 41
End: 2021-02-18
Payer: COMMERCIAL

## 2021-02-18 VITALS — WEIGHT: 140 LBS | HEART RATE: 85 BPM | HEIGHT: 65 IN | BODY MASS INDEX: 23.32 KG/M2 | TEMPERATURE: 98 F

## 2021-02-18 DIAGNOSIS — M54.10 RADICULAR LEG PAIN: ICD-10-CM

## 2021-02-18 DIAGNOSIS — G89.29 CHRONIC LOW BACK PAIN, UNSPECIFIED BACK PAIN LATERALITY, UNSPECIFIED WHETHER SCIATICA PRESENT: Primary | ICD-10-CM

## 2021-02-18 DIAGNOSIS — M54.50 CHRONIC LOW BACK PAIN, UNSPECIFIED BACK PAIN LATERALITY, UNSPECIFIED WHETHER SCIATICA PRESENT: Primary | ICD-10-CM

## 2021-02-18 DIAGNOSIS — M51.36 DDD (DEGENERATIVE DISC DISEASE), LUMBAR: ICD-10-CM

## 2021-02-18 PROCEDURE — G8427 DOCREV CUR MEDS BY ELIG CLIN: HCPCS | Performed by: ORTHOPAEDIC SURGERY

## 2021-02-18 PROCEDURE — 1036F TOBACCO NON-USER: CPT | Performed by: ORTHOPAEDIC SURGERY

## 2021-02-18 PROCEDURE — 99213 OFFICE O/P EST LOW 20 MIN: CPT | Performed by: ORTHOPAEDIC SURGERY

## 2021-02-18 PROCEDURE — G8420 CALC BMI NORM PARAMETERS: HCPCS | Performed by: ORTHOPAEDIC SURGERY

## 2021-02-18 PROCEDURE — G8484 FLU IMMUNIZE NO ADMIN: HCPCS | Performed by: ORTHOPAEDIC SURGERY

## 2021-02-18 NOTE — PROGRESS NOTES
Patient ID: Juanito Cavazos is a 38 y.o. female. Chief Complaint   Patient presents with    Follow-up     mri         HPI       Patient here predominately with symptoms of L5-S1 right radicular leg pain. Since patient was last seen she has had an MRI of her lumbar spine    She is continue to do her home exercises and stretches    Past Medical History:   Diagnosis Date    Asthma     Cancer ([address]     Thyroid     Past Surgical History:   Procedure Laterality Date     SECTION       Family History   Problem Relation Age of Onset    Other Mother         MTHFR Mutation    Diabetes Father         IDDM    Stroke Father     Heart Disease Father     Heart Attack Father     Arrhythmia Father     Arthritis Maternal Grandmother     Cancer Maternal Grandmother     Heart Failure Maternal Grandmother     Neuropathy Maternal Grandmother     Parkinsonism Maternal Grandfather     Arthritis Maternal Grandfather     Cancer Maternal Grandfather     High Blood Pressure Paternal Grandmother     Prostate Cancer Paternal Grandfather     Cancer Paternal Grandfather      Social History     Occupational History    Not on file   Tobacco Use    Smoking status: Never Smoker    Smokeless tobacco: Never Used   Substance and Sexual Activity    Alcohol use: No    Drug use: No    Sexual activity: Yes     Partners: Male        Review of Systems   All other systems reviewed and are negative. Physical Exam  Vitals signs and nursing note reviewed. Constitutional:       Appearance: She is well-developed. HENT:      Head: Normocephalic and atraumatic. Nose: Nose normal.   Eyes:      Conjunctiva/sclera: Conjunctivae normal.   Neck:      Musculoskeletal: Normal range of motion and neck supple. Pulmonary:      Effort: Pulmonary effort is normal. No respiratory distress. Musculoskeletal:      Comments: Normal gait     Skin:     General: Skin is warm and dry.    Neurological:      Mental Status: She is alert and oriented to person, place, and time. Sensory: No sensory deficit. Psychiatric:         Behavior: Behavior normal.         Thought Content: Thought content normal.     MRI reveals some right L4-5 foraminal stenosis significant L4-5 disc space collapse  Assessment:          1. Chronic low back pain, unspecified back pain laterality, unspecified whether sciatica present    2. Radicular leg pain    3. DDD (degenerative disc disease), lumbar        Plan:     Physical therapy    Lumbar epidural steroid injections    Follow-up    No orders of the defined types were placed in this encounter. Carolyn Hou MD    Please note that this chart was generated using voicerecognition Dragon dictation software. Although every effort was made to ensurethe accuracy of this automated transcription, some errors in transcription may haveoccurred.

## 2021-02-25 ENCOUNTER — HOSPITAL ENCOUNTER (OUTPATIENT)
Dept: PHYSICAL THERAPY | Facility: CLINIC | Age: 41
Setting detail: THERAPIES SERIES
Discharge: HOME | End: 2021-02-25
Payer: COMMERCIAL

## 2021-02-25 PROCEDURE — G0283 ELEC STIM OTHER THAN WOUND: HCPCS

## 2021-02-25 PROCEDURE — 97110 THERAPEUTIC EXERCISES: CPT

## 2021-02-25 PROCEDURE — 97140 MANUAL THERAPY 1/> REGIONS: CPT

## 2021-02-25 PROCEDURE — 97161 PT EVAL LOW COMPLEX 20 MIN: CPT

## 2021-02-25 NOTE — CONSULTS
[] [facility]) - [facility] &  Therapy  [address]  [phone]  F: [phone] [] [address]  [place] [address]   [address]  P: [phone]  F: [phone] [x] [address] &  Therapy  [address]  P: [phone]  F: [phone] [] [address]  P: [phone]  F: [phone] [] [address]  [facility]   [address]   [place]: [phone]  F: [phone]      Physical Therapy Spine Evaluation    Date:  2021  Patient: Yesenia Jorgensen  : 1980  MRN: [id number]  Physician: Eran Rice MD   [facility]  Medical Diagnosis: Chronic low back pain, unspecified back pain laterality, unspecified whether sciatica present, Radicular leg pain,  DDD  Rehab Codes: M54.5, G89.29 , M54.10 , M51.36  Onset Date: 21  Next 's appt.: prn    Subjective:   CC: Pt c/o constant LB pain R>L , R lateral LE pain to big toe and occ N&T. Sharp cramps and spasms in back and leg. Standing is sometimes better, sitting and laying down the worst. Pt to see pain mgmt, needs to schedule. HPI: (onset date) Pt doesn't recall any injury, gradual incr over the last yr. Notes she had a sig wt loss ~3 yrs ago (160 lbs from bariatric surg). PMHx: [] Unremarkable [x] Diabetes [] HTN  [] Pacemaker   [] MI/Heart Problems [x] Cancer- thyroid: removed, radiation [x] Arthritis [] Other:              [x] Refer to full medical chart  In EPIC       Tests: [] X-Ray: [x] MRI:  Lumbar spine degenerative changes at L4-L5 with moderate-severe disc space   narrowing and moderate right foraminal stenosis.      Medications: [x] Refer to full medical record [] None [] Other:  Allergies:      [x] Refer to full medical record [] None [] Other:    Function: Hand Dominance  [x] Right  [] Left  Patient lives with: son   Employer    Job Status [x]  Normal duty   [] Light duty   [] Off due to condition    []  Retired   [] Not employed   [] Disability  [] Other:  []  Return to work: Work activities/duties groomer       Pain:  [x] Yes  [] No Location: LB, R LE Pain Rating: (0-10 scale) 7/10, 10/10 worse at night  Pain altered Tx:  [] Yes  [x] No  Action:    Symptoms:  [] Improving [x] Worsening [] Same  Better:  [] AM    [] PM    [] Sit    [] Rise/Sit    []Stand    [] Walk    [] Lying    [] Other:  Worse: [] AM    [] PM    [x] Sit    [x] Rise/Sit    [x]Stand    [x] Walk    [x] Lying    [x] Bend                      [] Valsalva    [] Other:  Sleep: [] OK    [x] Disturbed    Objective:   STRENGTH  ROM    Left Right Cervical    L1-2 Hip Flex 5 4 Flexion    Hip Abd 4 4- Extension    L3-4 Knee Ext 5 4 Rotation L R   L4 Ankle DF 5 4 Sidebend L R   L5 EHL 5 4 Retraction    S1 Plant.  Flex 5 4 Lumbar    Abdominals   Flexion 55   Erector Spinae   Extension 25 +pn      Rotation L  R      Sidebend L 25 R 25      UE/LE                                               TESTS (+/-) LEFT RIGHT Not Tested   SLR [] sit [x] supine - + pn []   Hamstring (SLR) tight tight []   SKTC - + pn []   DKTC   []   Slump/Dural   []   SI JT + + []   JOSUÉ   []   Joint Mobility   []       OBSERVATION No Deficit Deficit Not Tested Comments   Posture       Forward Head [] [x] []    Rounded Shoulders [] [x] []    Kyphosis [] [] []    Lordosis [] [] []    Lateral Shift [] [] []    Scoliosis [] [] []    Iliac Crest [] [x] []    PSIS [] [x] [] +fwd flex L, L post rot   ASIS [] [x] []    Genu Valgus [] [] []    Genu Varus [] [] []    Genu Recurvatum [] [] []    Pronation [] [] []    Supination [] [] []    Leg Length Discrp [] [] []    Slumped Sitting [] [x] []    Palpation [] x [] sig midline TTP, mod muscular tension lumbar paraspinals, R>L gmed and piriformis   Sensation [] [x] [] N&T R LE   Edema [] [] [] Unattended  66728   [x] Manual Therapy  19594 [] Electrical Stimulation Attended  [id number]   [x] Instruction in HEP  [x] Lumbar/Cervical Traction  [id number]   [x] Aquatic Therapy   61859 [x] Cold/hotpack    [] Massage   11007      [] Dry Needling, 1 or 2 muscles  06206   [] Biofeedback, first 15 minutes   08135  [] Biofeedback, additional 15 minutes   29239 [x] Dry Needling, 3 or more muscles  00498       Frequency:  2 x/week for 18 visits    Todays Treatment:  Modalities: HP/ES to LB/SIJ in supine  Precautions:  Exercises: MET for SIJ dysfunction prn  Exercise Reps/ Time Weight/ Level Comments   HS stretch      SKTC 3x30     piriformis 3x30  Modified knee to opposite shoulder   DLS:            scap retraction 10x 5 sec          Other:Instructed in therex as noted, proper posture and positioning and issued HO for HEP. Specific Instructions for next treatment: monitor alignment, modalities prn, progress hip and core strenthening      Evaluation Complexity:  History (Personal factors, comorbidities) [] 0 [x] 1-2 [] 3+   Exam (limitations, restrictions) [x] 1-2 [] 3 [] 4+   Clinical presentation (progression) [x] Stable [] Evolving  [] Unstable   Decision Making [x] Low [] Moderate [] High    [x] Low Complexity [] Moderate Complexity [] High Complexity       Treatment Charges: Mins Units   [x] Evaluation       [x]  Low       []  Moderate       []  High 20 1   [x]  Modalities HP/ES 15 1   [x]  Ther Exercise 15 1   [x]  Manual Therapy 10 1   []  Ther Activities     []  Aquatics     []  Vasocompression     []  Other       TOTAL TREATMENT TIME: 60    Time in: 1130      Time out: 1240 pm    Electronically signed by: Carolyn Gracia PT        Physician Signature:________________________________Date:__________________  By signing above or cosigning this note, I have reviewed this plan of care and certify a need for medically necessary rehabilitation services.      *PLEASE SIGN ABOVE AND FAX BACK ALL PAGES*

## 2021-03-01 ENCOUNTER — HOSPITAL ENCOUNTER (OUTPATIENT)
Dept: PHYSICAL THERAPY | Facility: CLINIC | Age: 41
Setting detail: THERAPIES SERIES
Discharge: HOME | End: 2021-03-01
Payer: COMMERCIAL

## 2021-03-01 PROCEDURE — 97110 THERAPEUTIC EXERCISES: CPT

## 2021-03-01 PROCEDURE — G0283 ELEC STIM OTHER THAN WOUND: HCPCS

## 2021-03-01 NOTE — FLOWSHEET NOTE
10x 5 sec              Other:      Treatment Charges: Mins Units   [x]  Modalities HP/ES 15 1   [x]  Ther Exercise 20 2   [x]  Manual Therapy 5 -   []  Ther Activities     []  Aquatics     []  Vasocompression     []  Other     Total Treatment time         Assessment: [x] Progressing toward goals. [] No change. [] Other: Pt cont's w/ slight SIJ dysfunction and sig TTP R>L. Corrected w/ MET followed by HP/ES w/ good jhon. Rev prev HEP and completed ex per log, progressed core stab ex w/ emphasis on proper contraction and breathing. Pt jhon well noting no incr in symptoms. Stressed postural awareness and daily HEP. [x] Patient would continue to benefit from skilled physical therapy services in order to: address the following goals    STG: (to be met in 12 treatments)  1. ? Pain: Stabilize LB/LE pain and ensure optimal management of pain during all ADLs ( home, occupation, community and recreation)  2. ? ROM: Optimize Lumbar spine ROM for functional activity   3. ? Strength: R LE grossly 5/5 and demo good postural and core stability  4. ? Function:Pt to report improved sleep and ease w/ ADL's  5. Patient to be independent with home exercise program as demonstrated by performance with correct form without cues. LTG: (to be met in 18 treatments)  1. Able to complete adv ADL's and return to prev activity level w/o diffiuclty  2. Decr Oswestry score by 15% for improved overall function     Patient goals: ability to move around and sleep w/o pain    Pt. Education:  [x] Yes  [] No  [x] Reviewed Prior HEP/Ed  Method of Education: [x] Verbal  [x] Demo  [] Written  Comprehension of Education:  [x] Verbalizes understanding. [] Demonstrates understanding. [] Needs review. [] Demonstrates/verbalizes HEP/Ed previously given. Plan: [x] Continue current frequency toward long and short term goals.     [x] Specific Instructions for subsequent treatments: progress as able      Time In: 6:00 pm           Time Out: 6:50 pm    Electronically signed by:  Tiana Fuller, JOY

## 2021-03-05 ENCOUNTER — HOSPITAL ENCOUNTER (OUTPATIENT)
Dept: PHYSICAL THERAPY | Facility: CLINIC | Age: 41
Setting detail: THERAPIES SERIES
Discharge: HOME | End: 2021-03-05
Payer: COMMERCIAL

## 2021-03-05 PROCEDURE — G0283 ELEC STIM OTHER THAN WOUND: HCPCS

## 2021-03-05 PROCEDURE — 97110 THERAPEUTIC EXERCISES: CPT

## 2021-03-05 NOTE — FLOWSHEET NOTE
[] [facility]) [facility] &  Therapy  [address]  [phone]  F: [phone] [] [address]  [place] [address]   [address]  P: [phone]  F: [phone] [x] [address] &  Therapy  [address]  P: [phone]  F: [phone] [] [address]  P: [phone]  F: [phone] [] [address]  [facility]   [address]   [place]: [phone]  F: [phone]      Physical Therapy Daily Treatment Note    Date:  3/5/2021  Patient Name:  Filbert Cockayne    :  1980  MRN: [id number]  Physician: Lenora Falcon MD                                 Insurance: [employer] 30 visits  Medical Diagnosis: Chronic low back pain, unspecified back pain laterality, unspecified whether sciatica present, Radicular leg pain,           DDD  Rehab Codes: M54.5, G89.29 , M54.10 , M51.36  Onset Date: 21               Next 's appt.: prn  Visit# / total visits: 3/18     Cancels/No Shows: 0    Subjective:    Pt reported they felt some increased soreness on this date. Noted that they feel some relief after tx session, but by the next day the soreness has returned.   Pain:  [x] Yes  [] No Location: LB, hip Pain Rating: (0-10 scale) 7/10  Pain altered Tx:  [x] No  [] Yes  Action:  Comments:    Objective:  Modalities: HP/ES to LB/SIJ in supine  Precautions:  Exercises: MET and shotgun maneuver prn  Exercise Reps/ Time Weight/ Level Comments   bike      PRC ex 20x     MET 10\"x5     HS stretch         SKTC 3x30       piriformis 3x30   Modified knee to opposite shoulder   TrA contr with heel walks 10x     DLS:TrA contr  march  10x  10x  5-10 sec  breathing*  Add heel walks   bridges 10x     Clam shells                      scap retraction 10x 5 sec       Other:      Treatment Charges: Mins Units   [x]  Modalities HP/ES 15 1   [x]  Ther Exercise 25 2   []  Manual Therapy     []  Ther Activities     []  Aquatics     []  Vasocompression     []  Other     Total Treatment time 40 3       Assessment: [x] Progressing toward goals. [] No change. [] Other: Began with HP and IFC estim x15 min to address pain. Cont with stretching and strengthening ex as noted in flow sheet above with good jhon. Pt with continued slight SIJ dysfunction. Corrected w/ MET. Progressed pt with addition of TrA heel walks and bridges to increase core strength and stability. Provided patient with MET correction for HEP to address soreness in SIJ while at home. Pt with good understanding of HEP. [x] Patient would continue to benefit from skilled physical therapy services in order to: address the following goals    STG: (to be met in 12 treatments)  1. ? Pain: Stabilize LB/LE pain and ensure optimal management of pain during all ADLs ( home, occupation, community and recreation)  2. ? ROM: Optimize Lumbar spine ROM for functional activity   3. ? Strength: R LE grossly 5/5 and demo good postural and core stability  4. ? Function:Pt to report improved sleep and ease w/ ADL's  5. Patient to be independent with home exercise program as demonstrated by performance with correct form without cues. LTG: (to be met in 18 treatments)  1. Able to complete adv ADL's and return to prev activity level w/o diffiuclty  2. Decr Oswestry score by 15% for improved overall function     Patient goals: ability to move around and sleep w/o pain    Pt. Education:  [x] Yes  [] No  [x] Reviewed Prior HEP/Ed  Method of Education: [x] Verbal  [x] Demo  [x] Written  Comprehension of Education:  [x] Verbalizes understanding. [x] Demonstrates understanding. [] Needs review. [x] Demonstrates/verbalizes HEP/Ed previously given. Plan: [x] Continue current frequency toward long and short term goals.     [x] Specific Instructions for subsequent treatments: progress as able      Time In: 11:00 am           Time Out: 11:40  am    Electronically signed by:  Sonia Cooper PTA

## 2021-03-09 ENCOUNTER — HOSPITAL ENCOUNTER (OUTPATIENT)
Dept: PHYSICAL THERAPY | Facility: CLINIC | Age: 41
Setting detail: THERAPIES SERIES
Discharge: HOME | End: 2021-03-09
Payer: COMMERCIAL

## 2021-03-09 PROCEDURE — G0283 ELEC STIM OTHER THAN WOUND: HCPCS

## 2021-03-09 PROCEDURE — 97110 THERAPEUTIC EXERCISES: CPT

## 2021-03-09 NOTE — FLOWSHEET NOTE
5-10 sec              Other:    Treatment Charges: Mins Units   [x]  Modalities HP/ES 15 1   [x]  Ther Exercise 35 3   [x]  Manual Therapy 5 -   []  Ther Activities     []  Aquatics     []  Vasocompression     []  Other     Total Treatment time 55 4       Assessment: [x] Progressing toward goals. Pt with slight SIJ dysfunction and slight L upslip, corrected w/ LL traction, MET and shotgun maneuver. HP/ES to LB/SIJ to decr tissue tightness. Completed therex per log, added HS stretch and clam shells w/ cont 'd emphasis on core strength and stability. Min cues for proper contraction and breathing. Jhon well w/ no incr pain. Rev alt sleeping positions and propping prn as well as stretches before bed. Emphasized HEP daily, proper position/posturing and rev lifting mechanics for work duties. Will cont to monitor alignment and progress as jhon. Consider PTX also. [] No change. [x] Patient would continue to benefit from skilled physical therapy services in order to: address the following goals    STG: (to be met in 12 treatments)  1. ? Pain: Stabilize LB/LE pain and ensure optimal management of pain during all ADLs ( home, occupation, community and recreation)  2. ? ROM: Optimize Lumbar spine ROM for functional activity   3. ? Strength: R LE grossly 5/5 and demo good postural and core stability  4. ? Function:Pt to report improved sleep and ease w/ ADL's  5. Patient to be independent with home exercise program as demonstrated by performance with correct form without cues. LTG: (to be met in 18 treatments)  1. Able to complete adv ADL's and return to prev activity level w/o diffiuclty  2. Decr Oswestry score by 15% for improved overall function     Patient goals: ability to move around and sleep w/o pain    Pt. Education:  [x] Yes  [] No  [x] Reviewed Prior HEP/Ed  Method of Education: [x] Verbal  [x] Demo  [] Written  Comprehension of Education:  [x] Verbalizes understanding. [x] Demonstrates understanding.   [] Needs review. [x] Demonstrates/verbalizes HEP/Ed previously given. Plan: [x] Continue current frequency toward long and short term goals.     [x] Specific Instructions for subsequent treatments: progress as able      Time In: 5:00 pm           Time Out: 6:00 pm    Electronically signed by:  Brenda Berman PT

## 2021-03-12 ENCOUNTER — HOSPITAL ENCOUNTER (OUTPATIENT)
Dept: PHYSICAL THERAPY | Facility: CLINIC | Age: 41
Setting detail: THERAPIES SERIES
Discharge: HOME | End: 2021-03-12
Payer: COMMERCIAL

## 2021-03-12 PROCEDURE — G0283 ELEC STIM OTHER THAN WOUND: HCPCS

## 2021-03-12 PROCEDURE — 97110 THERAPEUTIC EXERCISES: CPT

## 2021-03-12 NOTE — FLOWSHEET NOTE
[] [facility] &  Therapy  [address]  [phone]  F: [phone] [] [address]  [address]   [address]  P: [phone]  F: [phone] [x] [address] &  Therapy  [address]  P: [phone]  F: [phone] [] [address]  P: [phone]  F: [phone] [] [address]  [facility]   [address]   [place]: [phone]  F: [phone]      Physical Therapy Daily Treatment Note    Date:  3/12/2021  Patient Name:  Jani Silverio    :  1980  MRN: [id number]  Physician: Toribio Cruz MD                                 Insurance: [facility] 30 visits  Medical Diagnosis: Chronic low back pain, unspecified back pain laterality, unspecified whether sciatica present, Radicular leg pain, DDD  Rehab Codes: M54.5, G89.29 , M54.10 , M51.36  Onset Date: 21               Next 's appt.: prn  Visit# / total visits:      Cancels/No Shows: 0    Subjective:  Pt reports she felt pretty good after last session, however pain increased this morning. Pt feels very sore through R hip flexor today.   Pain:  [x] Yes  [] No Location: LB, hip Pain Rating: (0-10 scale) 7-8/10  Pain altered Tx:  [x] No  [] Yes  Action:  Comments:    Objective:  Modalities: HP/ES to LB/SIJ in supine  Precautions:  Exercises: MET and shotgun maneuver prn  Exercise Reps/ Time Weight/ Level Comments   bike      PRC ex 20x     MET 10\"x5 prn    HS stretch  3x30 sec   W/ rope, stool next*   SKTC 3x30       piriformis 3x30   Modified knee to opposite shoulder   Hip flexor  3x30\"  EOB   DLS:TrA contr, w/   March, w/ heel walk  10x  10x ea  5-10 sec  breathing   bridges 10x2     Clam shells 10x                     scap retraction 10x 5-10 sec       Other:    Treatment Charges: Mins Units   [x]  Modalities HP/ES 20 1   [x]  Ther Exercise 30 2   []  Manual Therapy     []  Ther Activities     []  Aquatics     []  Vasocompression     []  Other     Total Treatment time 50 3       Assessment: [x] Progressing toward goals. Pt with cont slight SIJ dysfunction, corrected  MET. HP/ES to LB/SIJ to decr tissue tightness. Completed therex per log, added hip flexor stretch. Min cues for proper contraction and breathing. Jhon well and reports less pain after tx. Will cont to monitor alignment and progress as jhon. Consider PTX also. [] No change. [x] Patient would continue to benefit from skilled physical therapy services in order to: address the following goals    STG: (to be met in 12 treatments)  1. ? Pain: Stabilize LB/LE pain and ensure optimal management of pain during all ADLs ( home, occupation, community and recreation)  2. ? ROM: Optimize Lumbar spine ROM for functional activity   3. ? Strength: R LE grossly 5/5 and demo good postural and core stability  4. ? Function:Pt to report improved sleep and ease w/ ADL's  5. Patient to be independent with home exercise program as demonstrated by performance with correct form without cues. LTG: (to be met in 18 treatments)  1. Able to complete adv ADL's and return to prev activity level w/o diffiuclty  2. Decr Oswestry score by 15% for improved overall function     Patient goals: ability to move around and sleep w/o pain    Pt. Education:  [x] Yes  [] No  [x] Reviewed Prior HEP/Ed  Method of Education: [x] Verbal  [x] Demo  [] Written  Comprehension of Education:  [x] Verbalizes understanding. [x] Demonstrates understanding. [] Needs review. [x] Demonstrates/verbalizes HEP/Ed previously given. Plan: [x] Continue current frequency toward long and short term goals.     [x] Specific Instructions for subsequent treatments: progress as able      Time In: 10:05am          Time Out: 11:00 am    Electronically signed by:   Odell 34, PTA

## 2021-03-16 ENCOUNTER — HOSPITAL ENCOUNTER (OUTPATIENT)
Dept: PHYSICAL THERAPY | Facility: CLINIC | Age: 41
Setting detail: THERAPIES SERIES
Discharge: HOME | End: 2021-03-16
Payer: COMMERCIAL

## 2021-03-16 PROCEDURE — 97110 THERAPEUTIC EXERCISES: CPT

## 2021-03-16 PROCEDURE — G0283 ELEC STIM OTHER THAN WOUND: HCPCS

## 2021-03-16 NOTE — FLOWSHEET NOTE
[] [facility] &  Therapy  [address]  [phone]  F: [phone] [] [address]  [facility]   [address]  P: [phone]  F: [phone] [x] [address]   [facility] &  Therapy  [address]  P: [phone]  F: [phone] [] [address]  P: [phone]  F: [phone] [] [address]  [facility]   [address]   [place]: [phone]  F: [phone]      Physical Therapy Daily Treatment Note    Date:  3/16/2021  Patient Name:  Beni Bear    :  1980  MRN: [id number]  Physician: Liliana Coleman MD                                 Insurance: [employer] 30 visits  Medical Diagnosis: Chronic low back pain, unspecified back pain laterality, unspecified whether sciatica present, Radicular leg pain, DDD  Rehab Codes: M54.5, G89.29 , M54.10 , M51.36  Onset Date: 21               Next 's appt.: prn  Visit# / total visits:      Cancels/No Shows: 0    Subjective:    Pain:  [x] Yes  [] No Location: LB, hip Pain Rating: (0-10 scale) 6/10  Pain altered Tx:  [x] No  [] Yes  Action:  Comments: Pt reported slight decrease in pain this date.      Objective:  Modalities: HP/ES to LB/SIJ in supine  Precautions:  Exercises: MET and shotgun maneuver prn  Exercise Reps/ Time Weight/ Level Comments    PRC ex 20x   x   MET 10\"x5 prn     HS stretch  3x30 sec   stool  x   SKTC 3x30 ea     x   piriformis 3x30 ea   Modified knee to opposite shoulder x   Hip flexor  3x30\" ea  EOB    DLS:TrA contr, w/   March, w/ heel walk  10x  10x ea  5-10 sec  breathing x   bridges 10x2   x   Clam shells 15x   x                     scap retraction 10x 5-10 sec   x             Other:    Treatment Charges: Mins Units   [x]  Modalities HP/ES 20 1   [x]  Ther Exercise 30 2   []  Manual Therapy     []  Ther Activities     []  Aquatics     []  Vasocompression     []  Other     Total Treatment time 50 3       Assessment: [x] Progressing toward goals. Pt with no SIJ dysfunction this date. Pt began treatment with HP/IFC to decrease pain and musculare tension. Pt with good tolerance to all activities as listed above. Changed Hamstring stretch to seated. Increased reps for clamshells to improve hip strength. [] No change. [x] Patient would continue to benefit from skilled physical therapy services in order to: address the following goals    STG: (to be met in 12 treatments)  1. ? Pain: Stabilize LB/LE pain and ensure optimal management of pain during all ADLs ( home, occupation, community and recreation)  2. ? ROM: Optimize Lumbar spine ROM for functional activity   3. ? Strength: R LE grossly 5/5 and demo good postural and core stability  4. ? Function:Pt to report improved sleep and ease w/ ADL's  5. Patient to be independent with home exercise program as demonstrated by performance with correct form without cues. LTG: (to be met in 18 treatments)  1. Able to complete adv ADL's and return to prev activity level w/o diffiuclty  2. Decr Oswestry score by 15% for improved overall function     Patient goals: ability to move around and sleep w/o pain    Pt. Education:  [x] Yes  [] No  [x] Reviewed Prior HEP/Ed  Method of Education: [x] Verbal  [x] Demo  [] Written  Comprehension of Education:  [x] Verbalizes understanding. [x] Demonstrates understanding. [] Needs review. [x] Demonstrates/verbalizes HEP/Ed previously given. Plan: [x] Continue current frequency toward long and short term goals. [x] Specific Instructions for subsequent treatments: Progress as tolerated within POC.        Time In: 1700          Time Out: 1750    Electronically signed by:  Geraldine Gonzalez PTA

## 2021-03-18 ENCOUNTER — APPOINTMENT (OUTPATIENT)
Dept: PHYSICAL THERAPY | Facility: CLINIC | Age: 41
End: 2021-03-18
Payer: COMMERCIAL

## 2021-03-19 ENCOUNTER — HOSPITAL ENCOUNTER (OUTPATIENT)
Dept: PHYSICAL THERAPY | Facility: CLINIC | Age: 41
Setting detail: THERAPIES SERIES
Discharge: HOME | End: 2021-03-19
Payer: COMMERCIAL

## 2021-03-19 PROCEDURE — 97110 THERAPEUTIC EXERCISES: CPT

## 2021-03-19 PROCEDURE — G0283 ELEC STIM OTHER THAN WOUND: HCPCS

## 2021-03-19 NOTE — FLOWSHEET NOTE
[] [facility]) [facility] &  Therapy  [address]  [phone]  F: [phone] [] [address]  [place] [address]   [address]  P: [phone]  F: [phone] [x] [address]  Therapy  [address]  P: [phone]  F: [phone] [] [address]  P: [phone]  F: [phone] [] [address]  [facility]   [address]   [place]: [phone]  F: [phone]      Physical Therapy Daily Treatment Note    Date:  3/19/2021  Patient Name:  Dane Han    :  1980  MRN: [id number]  Physician: Frank Cuenca MD                                 Insurance: [employer] Kings 30 visits  Medical Diagnosis: Chronic low back pain, unspecified back pain laterality, unspecified whether sciatica present, Radicular leg pain, DDD  Rehab Codes: M54.5, G89.29 , M54.10 , M51.36  Onset Date: 21               Next 's appt.: prn  Visit# / total visits:      Cancels/No Shows: 0    Subjective:    Pain:  [x] Yes  [] No Location: LB, hip Pain Rating: (0-10 scale) 6/10  Pain altered Tx:  [x] No  [] Yes  Action:  Comments: Pt reports cont pain R SIJ, sacral area today. Pt feels she is improving overall sarah during the day. At night she cont with most pain and difficulty getting comfortable.      Objective:  Modalities: HP/ES to LB/SIJ in supine  Precautions:  Exercises: MET and shotgun maneuver prn  Exercise Reps/ Time Weight/ Level Comments    PRC ex 20x   x   MET 10\"x5 prn     HS stretch  3x30 sec   stool  x   DKTC 3x30 ea     x   piriformis 3x30 ea   Modified knee to opposite shoulder x   Hip flexor  3x30\" ea  EOB x   DLS:TrA contr, w/   March, w/ heel walk  10x  15x ea  5-10 sec  breathing x   bridges 10x2   x   Clam shells 15x   x                  scap retraction 10x 5-10 sec   x             Other:    Treatment Charges: Mins Units   [x]  Modalities HP/ES 20 1   [x]  Ther Exercise 25 2   []  Manual Therapy     []  Ther Activities     []  Aquatics     []  Vasocompression     []  Other     Total Treatment time 45 3       Assessment: [x] Progressing toward goals. Pt cont with good pelvic alignment this date. Cont with modalities and stretching and ex per flow sheet. Increased reps during lumbar stab ex with good jhon. Discussed sidelying postioning for home and pt able to borrow C shaped pillow from a friend. [] No change. [x] Patient would continue to benefit from skilled physical therapy services in order to: address the following goals    STG: (to be met in 12 treatments)  1. ? Pain: Stabilize LB/LE pain and ensure optimal management of pain during all ADLs ( home, occupation, community and recreation)  2. ? ROM: Optimize Lumbar spine ROM for functional activity   3. ? Strength: R LE grossly 5/5 and demo good postural and core stability  4. ? Function:Pt to report improved sleep and ease w/ ADL's  5. Patient to be independent with home exercise program as demonstrated by performance with correct form without cues. LTG: (to be met in 18 treatments)  1. Able to complete adv ADL's and return to prev activity level w/o diffiuclty  2. Decr Oswestry score by 15% for improved overall function     Patient goals: ability to move around and sleep w/o pain    Pt. Education:  [x] Yes  [] No  [x] Reviewed Prior HEP/Ed  Method of Education: [x] Verbal  [x] Demo  [] Written  Comprehension of Education:  [x] Verbalizes understanding. [x] Demonstrates understanding. [] Needs review. [x] Demonstrates/verbalizes HEP/Ed previously given. Plan: [x] Continue current frequency toward long and short term goals. [x] Specific Instructions for subsequent treatments: Progress as tolerated within POC.        Time In: 10:00am          Time Out: 10:50  am    Electronically signed by:  Carolyn Wallace, TERI

## 2021-03-23 ENCOUNTER — HOSPITAL ENCOUNTER (OUTPATIENT)
Dept: PHYSICAL THERAPY | Facility: CLINIC | Age: 41
Setting detail: THERAPIES SERIES
Discharge: HOME | End: 2021-03-23
Payer: COMMERCIAL

## 2021-03-23 PROCEDURE — 97140 MANUAL THERAPY 1/> REGIONS: CPT

## 2021-03-23 PROCEDURE — 97012 MECHANICAL TRACTION THERAPY: CPT

## 2021-03-23 PROCEDURE — G0283 ELEC STIM OTHER THAN WOUND: HCPCS

## 2021-03-23 PROCEDURE — 97110 THERAPEUTIC EXERCISES: CPT

## 2021-03-23 NOTE — FLOWSHEET NOTE
[] [facility]) [facility] &  Therapy  [address]  [phone]  F: [phone] [] [address]  [place] [address]   [address]  P: [phone]  F: [phone] [x] [address] &  Therapy  [address]  P: [phone]  F: [phone] [] [address]  P: [phone]  F: [phone] [] [address]  [facility]   [address]   [place]: [phone]  F: [phone]      Physical Therapy Daily Treatment Note    Date:  3/23/2021  Patient Name:  Jani Silverio    :  1980  MRN: [id number]  Physician: Toribio Cruz MD                                 Insurance: [employer] 30 visits  Medical Diagnosis: Chronic low back pain, unspecified back pain laterality, unspecified whether sciatica present, Radicular leg pain, DDD  Rehab Codes: M54.5, G89.29 , M54.10 , M51.36  Onset Date: 21               Next 's appt.: prn  Visit# / total visits:      Cancels/No Shows: 0     Subjective:    Pain:  [x] Yes  [] No Location: LB, hip Pain Rating: (0-10 scale) 5/10  Pain altered Tx:  [x] No  [] Yes  Action:  Comments: Pt reports pain R taye and LB. Night pain still the worst. Cont's w/ intermittent N&T R LE. Sees pain mgmt on Thursday. Modalities: HP/ES to LB/SIJ in supine. Pelvic traction 60+ lbs (30-40 on/10 off)  Precautions: thyroid Ca  Exercises: MET and shotgun maneuver prn.  MFR/TPr/DI to hip flexor, Gmed, piriformis prn  Exercise Reps/ Time Weight/ Level Comments    PRC ex 20x   x   MET 10\"x5 prn  x   HS stretch  3x30 sec   stool     DKTC 3x30 ea     x   piriformis 3x30 ea   Modified knee to opposite shoulder x   Hip flexor  3x30\" ea  EOB x   DLS:TrA contr, w/   March, w/ heel walk  10x  15x ea  5-10 sec  breathing x   bridges 10x2      Clam shells 15x                        scap retraction 10x 5-10 sec   x             Other:    Treatment Charges: Mins Units   [x]  Modalities HP/ES  PTX (65 Lbs) 15  15 1  1   [x]  Ther Exercise 15 1   [x]  Manual Therapy 10 1   []  Ther Activities     []  Aquatics     []  Vasocompression     []  Other     Total Treatment time 55 4       Assessment: [x] Progressing toward goals. Pt w/ slight R ant rot this date, L upslip and sig tightness R>L lumbar paraspinls, Gmed, piriformis and hip flexor. Leg pull, MET and shotgun maneuver w/ audible correction. Added manual as noted w/ good jhon. Rev all HEP and completed stretches. Trial of Ptx this date, jhon well an notes no N&T after. Pt to monitor, will cont as jhon w/ added PTx. [x] No change. Trial pelvic traction, add cupping, DN prn  [x] Patient would continue to benefit from skilled physical therapy services in order to: address the following goals    STG: (to be met in 12 treatments)  1. ? Pain: Stabilize LB/LE pain and ensure optimal management of pain during all ADLs ( home, occupation, community and recreation)  2. ? ROM: Optimize Lumbar spine ROM for functional activity   3. ? Strength: R LE grossly 5/5 and demo good postural and core stability  4. ? Function:Pt to report improved sleep and ease w/ ADL's  5. Patient to be independent with home exercise program as demonstrated by performance with correct form without cues. LTG: (to be met in 18 treatments)  1. Able to complete adv ADL's and return to prev activity level w/o diffiuclty  2. Decr Oswestry score by 15% for improved overall function     Patient goals: ability to move around and sleep w/o pain    Pt. Education:  [x] Yes  [] No  [x] Reviewed Prior HEP/Ed  Method of Education: [x] Verbal  [x] Demo  [] Written  Comprehension of Education:  [x] Verbalizes understanding. [x] Demonstrates understanding. [] Needs review. [x] Demonstrates/verbalizes HEP/Ed previously given.      Plan: [x] Continue current frequency toward long and short term goals. [x] Specific Instructions for subsequent treatments: Progress as tolerated within POC.        Time In: 6:10 pm          Time Out: 7:15  pm    Electronically signed by:  Marianna Perez PT

## 2021-03-25 ENCOUNTER — HOSPITAL ENCOUNTER (OUTPATIENT)
Dept: PHYSICAL THERAPY | Facility: CLINIC | Age: 41
Setting detail: THERAPIES SERIES
Discharge: HOME | End: 2021-03-25
Payer: COMMERCIAL

## 2021-03-25 ENCOUNTER — INITIAL CONSULT (OUTPATIENT)
Dept: PAIN MANAGEMENT | Age: 41
End: 2021-03-25
Payer: COMMERCIAL

## 2021-03-25 VITALS
HEIGHT: 65 IN | BODY MASS INDEX: 23.99 KG/M2 | WEIGHT: 144 LBS | HEART RATE: 69 BPM | DIASTOLIC BLOOD PRESSURE: 64 MMHG | SYSTOLIC BLOOD PRESSURE: 114 MMHG

## 2021-03-25 DIAGNOSIS — M54.16 LUMBAR RADICULOPATHY: Primary | ICD-10-CM

## 2021-03-25 DIAGNOSIS — M54.50 CHRONIC LOW BACK PAIN, UNSPECIFIED BACK PAIN LATERALITY, UNSPECIFIED WHETHER SCIATICA PRESENT: Primary | ICD-10-CM

## 2021-03-25 DIAGNOSIS — G89.29 CHRONIC LOW BACK PAIN, UNSPECIFIED BACK PAIN LATERALITY, UNSPECIFIED WHETHER SCIATICA PRESENT: Primary | ICD-10-CM

## 2021-03-25 DIAGNOSIS — M51.36 DEGENERATION OF LUMBAR INTERVERTEBRAL DISC: ICD-10-CM

## 2021-03-25 PROCEDURE — 97012 MECHANICAL TRACTION THERAPY: CPT

## 2021-03-25 PROCEDURE — 99204 OFFICE O/P NEW MOD 45 MIN: CPT | Performed by: PAIN MEDICINE

## 2021-03-25 PROCEDURE — 97110 THERAPEUTIC EXERCISES: CPT

## 2021-03-25 PROCEDURE — G8484 FLU IMMUNIZE NO ADMIN: HCPCS | Performed by: PAIN MEDICINE

## 2021-03-25 PROCEDURE — G0283 ELEC STIM OTHER THAN WOUND: HCPCS

## 2021-03-25 PROCEDURE — G8427 DOCREV CUR MEDS BY ELIG CLIN: HCPCS | Performed by: PAIN MEDICINE

## 2021-03-25 PROCEDURE — G8420 CALC BMI NORM PARAMETERS: HCPCS | Performed by: PAIN MEDICINE

## 2021-03-25 PROCEDURE — 1036F TOBACCO NON-USER: CPT | Performed by: PAIN MEDICINE

## 2021-03-25 RX ORDER — ALOGLIPTIN 25 MG/1
TABLET, FILM COATED ORAL
COMMUNITY
Start: 2021-02-02 | End: 2022-04-11

## 2021-03-25 RX ORDER — LEVOTHYROXINE SODIUM 0.12 MG/1
125 TABLET ORAL DAILY
COMMUNITY

## 2021-03-25 RX ORDER — OMEPRAZOLE 20 MG/1
CAPSULE, DELAYED RELEASE ORAL
COMMUNITY
Start: 2021-02-02

## 2021-03-25 RX ORDER — GLIPIZIDE 10 MG/1
TABLET ORAL
COMMUNITY
Start: 2021-02-11

## 2021-03-25 ASSESSMENT — ENCOUNTER SYMPTOMS
BOWEL INCONTINENCE: 0
BACK PAIN: 1
ABDOMINAL PAIN: 0
POOR WOUND HEALING: 0
PERSISTENT INFECTIONS: 0
RESPIRATORY NEGATIVE: 1
EYES NEGATIVE: 1

## 2021-03-25 NOTE — FLOWSHEET NOTE
[] [facility]) [facility] &  Therapy  [address]  [phone]  F: [phone] [] [address]  [facility]   [address]  P: [phone]  F: [phone] [x] [address] &  Therapy  [address]  P: [phone]  F: [phone] [] [address]  P: [phone]  F: [phone] [] [address]  [facility]   [address]   [place]: [phone]  F: [phone]      Physical Therapy Daily Treatment Note    Date:  3/25/2021  Patient Name:  Jani Silverio    :  1980  MRN: [id number]  Physician: Toribio Cruz MD                                 Insurance: [employer] 30 visits  Medical Diagnosis: Chronic low back pain, unspecified back pain laterality, unspecified whether sciatica present, Radicular leg pain, DDD  Rehab Codes: M54.5, G89.29 , M54.10 , M51.36  Onset Date: 21               Next 's appt.: prn  Visit# / total visits:      Cancels/No Shows: 0     Subjective:    Pain:  [x] Yes  [] No Location: LB, hip Pain Rating: (0-10 scale) 5/10  Pain altered Tx:  [x] No  [] Yes  Action:  Comments: Pt arrives with moderate pain and states she did not experience any issues after previous session. Modalities: HP/ES to LB/SIJ in supine. Pelvic traction 60+ lbs (30-40 on/10 off)  Precautions: thyroid Ca  Exercises: MET and shotgun maneuver prn.  MFR/TPr/DI to hip flexor, Gmed, piriformis prn  Exercise Reps/ Time Weight/ Level Comments    PRC ex 20x   x   MET 10\"x5 prn  x   HS stretch  3x30 sec   stool     DKTC 3x30 ea     x   piriformis 3x30 ea   Modified knee to opposite shoulder x   Hip flexor  3x30\" ea  EOB x   DLS:TrA contr, /   March, w/ heel walk  10x  15x ea  5-10 sec  breathing x   bridges 10x2      Clam shells 15x            scap retraction 15x 5-10 sec   x             Other:    Treatment Charges: Mins Units   [x]  Modalities HP/ES  PTX (66 Lbs) 15  15 1  1   [x]  Ther Exercise 15 1   []  Manual Therapy     []  Ther Activities     []  Aquatics     []  Vasocompression     []  Other     Total Treatment time 45 3       Assessment: [x] Progressing toward goals. Pt continues to possess a slight R anterior rotation this date. Continued with therapeutic exercise program as noted above for correction and to promote flexibility. Pt compliant and demonstrated good understanding of HEP. Ended session with PTx with a slight increase in poundage applied. Pt reports she noticed a difference with the traction increase however denies any pain or discomfort and states PT helps her feel better throughout the day. Pt has an appointment with pain management today and hopes it will help her achieve a more consistent sleeping pattern. Will cont to monitor pt's tolerance and progress as able. [x] No change. Trial pelvic traction, add cupping, DN prn  [x] Patient would continue to benefit from skilled physical therapy services in order to: address the following goals    STG: (to be met in 12 treatments)  1. ? Pain: Stabilize LB/LE pain and ensure optimal management of pain during all ADLs ( home, occupation, community and recreation)  2. ? ROM: Optimize Lumbar spine ROM for functional activity   3. ? Strength: R LE grossly 5/5 and demo good postural and core stability  4. ? Function:Pt to report improved sleep and ease w/ ADL's  5. Patient to be independent with home exercise program as demonstrated by performance with correct form without cues. LTG: (to be met in 18 treatments)  1. Able to complete adv ADL's and return to prev activity level w/o diffiuclty  2. Decr Oswestry score by 15% for improved overall function     Patient goals: ability to move around and sleep w/o pain    Pt.  Education:  [x] Yes  [] No  [x] Reviewed Prior HEP/Ed  Method of Education: [x] Verbal  [x] Demo  [] Written  Comprehension of Education:  [x] Verbalizes understanding. [x] Demonstrates understanding. [] Needs review. [x] Demonstrates/verbalizes HEP/Ed previously given. Plan: [x] Continue current frequency toward long and short term goals. [x] Specific Instructions for subsequent treatments: Progress as tolerated within POC. Time In: 9:00 am          Time Out: 10: 03 am    Electronically signed by:   Mague Romero [place]

## 2021-03-25 NOTE — PROGRESS NOTES
HPI:     Back Pain  This is a chronic problem. The current episode started more than 1 year ago. The problem occurs constantly. The problem has been gradually worsening since onset. The pain is present in the lumbar spine and gluteal. The quality of the pain is described as aching and cramping. The pain radiates to the right foot, right thigh and right knee (right hip and right leg). The pain is at a severity of 7/10. The pain is moderate. The pain is worse during the night. The symptoms are aggravated by bending, lying down, position, sitting, stress, twisting, standing and coughing. Stiffness is present all day. Associated symptoms include leg pain, numbness, tingling and weakness. Pertinent negatives include no abdominal pain, bladder incontinence, bowel incontinence, chest pain, dysuria, fever, headaches, paresis, paresthesias, pelvic pain, perianal numbness or weight loss. Risk factors include history of cancer. She has tried NSAIDs, heat, analgesics and home exercises (physical therapy) for the symptoms. The treatment provided mild relief. The low back down the right leg has been ongoing for 1 year. She has done physical therapy continues to have lingering pain. She has seen a surgeon who suggested more conservative measures. MRI lumbar spine with advanced generative changes at L4-5. Patient denies any new neurological symptoms. No bowel or bladder incontinence, no weakness, and no falling. Review of OARRS does not show any aberrant prescription behavior.      Past Medical History:   Diagnosis Date    Asthma     Cancer [facility])     Thyroid    Controlled type 2 diabetes mellitus without complication, without long-term current use of insulin (HCC)     Thyroid cancer [facility])        Past Surgical History:   Procedure Laterality Date     SECTION      TOTAL THYROIDECTOMY Bilateral 2017       No Known Allergies      Current Outpatient Medications:     Levothyroxine Sodium 112 MCG CAPS, Take 112 Not on file     Inability: Not on file    Transportation needs     Medical: Not on file     Non-medical: Not on file   Tobacco Use    Smoking status: Never Smoker    Smokeless tobacco: Never Used   Substance and Sexual Activity    Alcohol use: No    Drug use: No    Sexual activity: Yes     Partners: Male   Lifestyle    Physical activity     Days per week: Not on file     Minutes per session: Not on file    Stress: Not on file   Relationships    Social connections     Talks on phone: Not on file     Gets together: Not on file     Attends Latter-day service: Not on file     Active member of club or organization: Not on file     Attends meetings of clubs or organizations: Not on file     Relationship status: Not on file    Intimate partner violence     Fear of current or ex partner: Not on file     Emotionally abused: Not on file     Physically abused: Not on file     Forced sexual activity: Not on file   Other Topics Concern    Not on file   Social History Narrative    ** Merged History Encounter **            Review of Systems:  Review of Systems   Constitution: Negative for fever and weight loss. HENT: Negative. Eyes: Negative. Cardiovascular: Negative for chest pain. Respiratory: Negative. Endocrine: Negative. Hematologic/Lymphatic: Negative. Skin: Negative for poor wound healing. Musculoskeletal: Positive for back pain. Gastrointestinal: Negative for abdominal pain and bowel incontinence. Genitourinary: Negative for bladder incontinence, dysuria and pelvic pain. Neurological: Positive for numbness, tingling and weakness. Negative for headaches and paresthesias. Psychiatric/Behavioral: Negative. Allergic/Immunologic: Negative for persistent infections. Physical Exam:  /64   Pulse 69   Ht 5' 5\" (1.651 m)   Wt 144 lb (65.3 kg)   BMI 23.96 kg/m²     Physical Exam  Constitutional:       Appearance: Normal appearance.    Pulmonary:      Effort: Pulmonary effort is normal.   Neurological:      Mental Status: She is alert. Psychiatric:         Attention and Perception: Attention and perception normal.         Mood and Affect: Mood and affect normal.         Record/Diagnostics Review:    As above, I did independently review the imaging    Orders:  Orders Placed This Encounter   Procedures    CA NJX AA&/STRD TFRML EPI LUMBAR/SACRAL 1 LEVEL    CA NJX AA&/STRD TFRML EPI LUMBAR/SACRAL EA ADDL       Assessment:  1. Lumbar radiculopathy    2. Degeneration of lumbar intervertebral disc        Treatment Plan:  DISCUSSION: Treatment options discussed with patient and all questions answered to patient's satisfaction. OARRS Review: Reviewed and acceptable for medications prescribed. TREATMENT OPTIONS:     Discussed different treatment options including continued conservative care such as physical therapy, chiropractic care, acupuncture. Discussed different interventional options such as epidural steroids or medial branch blocks. Also discussed surgical evaluation. Pain in the low back and legs continues despite conservative measures, may benefit from epidural steroid injections, we'll try the Right L4 and L5 transforaminal approach x 2. Marques Owen M.D. I have reviewed the chief complaint and history of present illness (including ROS and PFSH) and vital documentation by my staff and I agree with their documentation and have added where applicable.

## 2021-03-25 NOTE — TELEPHONE ENCOUNTER
PT says that her next apt with [facility] is in 4 weeks, and that she can not wait that long with her pain. PT wants to know if Dr Chris Ronquillo can order her something in the meantime to giver her relief until her injection with pain Management.     Please reach out @ [phone]

## 2021-03-29 ENCOUNTER — TELEPHONE (OUTPATIENT)
Dept: PAIN MANAGEMENT | Age: 41
End: 2021-03-29

## 2021-03-29 RX ORDER — IBUPROFEN 800 MG/1
800 TABLET ORAL EVERY 8 HOURS PRN
Qty: 90 TABLET | Refills: 0 | Status: SHIPPED | OUTPATIENT
Start: 2021-03-29 | End: 2021-04-26

## 2021-03-30 ENCOUNTER — HOSPITAL ENCOUNTER (OUTPATIENT)
Age: 41
Discharge: HOME | End: 2021-03-30
Payer: COMMERCIAL

## 2021-03-30 ENCOUNTER — HOSPITAL ENCOUNTER (OUTPATIENT)
Dept: PHYSICAL THERAPY | Facility: CLINIC | Age: 41
Setting detail: THERAPIES SERIES
Discharge: HOME | End: 2021-03-30
Payer: COMMERCIAL

## 2021-03-30 LAB
ABSOLUTE EOS #: 0.19 K/UL (ref 0–0.44)
ABSOLUTE IMMATURE GRANULOCYTE: <0.03 K/UL (ref 0–0.3)
ABSOLUTE LYMPH #: 2.21 K/UL (ref 1.1–3.7)
ABSOLUTE MONO #: 0.42 K/UL (ref 0.1–1.2)
ALBUMIN SERPL-MCNC: 4 G/DL (ref 3.5–5.2)
ALBUMIN/GLOBULIN RATIO: 1.5 (ref 1–2.5)
ALP BLD-CCNC: 45 U/L (ref 35–104)
ALT SERPL-CCNC: 12 U/L (ref 5–33)
ANION GAP SERPL CALCULATED.3IONS-SCNC: 9 MMOL/L (ref 9–17)
AST SERPL-CCNC: 16 U/L
BASOPHILS # BLD: 1 % (ref 0–2)
BASOPHILS ABSOLUTE: 0.04 K/UL (ref 0–0.2)
BILIRUB SERPL-MCNC: 0.34 MG/DL (ref 0.3–1.2)
BUN BLDV-MCNC: 9 MG/DL (ref 6–20)
BUN/CREAT BLD: ABNORMAL (ref 9–20)
CALCIUM SERPL-MCNC: 9.1 MG/DL (ref 8.6–10.4)
CHLORIDE BLD-SCNC: 103 MMOL/L (ref 98–107)
CO2: 26 MMOL/L (ref 20–31)
CREAT SERPL-MCNC: 0.51 MG/DL (ref 0.5–0.9)
DIFFERENTIAL TYPE: ABNORMAL
EOSINOPHILS RELATIVE PERCENT: 3 % (ref 1–4)
FERRITIN: 17 UG/L (ref 13–150)
GFR AFRICAN AMERICAN: >60 ML/MIN
GFR NON-AFRICAN AMERICAN: >60 ML/MIN
GFR SERPL CREATININE-BSD FRML MDRD: ABNORMAL ML/MIN/{1.73_M2}
GFR SERPL CREATININE-BSD FRML MDRD: ABNORMAL ML/MIN/{1.73_M2}
GLUCOSE BLD-MCNC: 157 MG/DL (ref 70–99)
HCT VFR BLD CALC: 37.2 % (ref 36.3–47.1)
HEMOGLOBIN: 11.5 G/DL (ref 11.9–15.1)
IMMATURE GRANULOCYTES: 0 %
IRON SATURATION: 25 % (ref 20–55)
IRON: 82 UG/DL (ref 37–145)
LYMPHOCYTES # BLD: 37 % (ref 24–43)
MAGNESIUM: 2.1 MG/DL (ref 1.6–2.6)
MCH RBC QN AUTO: 28.3 PG (ref 25.2–33.5)
MCHC RBC AUTO-ENTMCNC: 30.9 G/DL (ref 28.4–34.8)
MCV RBC AUTO: 91.4 FL (ref 82.6–102.9)
MONOCYTES # BLD: 7 % (ref 3–12)
NRBC AUTOMATED: 0 PER 100 WBC
PDW BLD-RTO: 14.9 % (ref 11.8–14.4)
PHOSPHORUS: 3.9 MG/DL (ref 2.6–4.5)
PLATELET # BLD: 254 K/UL (ref 138–453)
PLATELET ESTIMATE: ABNORMAL
PMV BLD AUTO: 11.3 FL (ref 8.1–13.5)
POTASSIUM SERPL-SCNC: 4.5 MMOL/L (ref 3.7–5.3)
RBC # BLD: 4.07 M/UL (ref 3.95–5.11)
RBC # BLD: ABNORMAL 10*6/UL
SEG NEUTROPHILS: 52 % (ref 36–65)
SEGMENTED NEUTROPHILS ABSOLUTE COUNT: 3.1 K/UL (ref 1.5–8.1)
SODIUM BLD-SCNC: 138 MMOL/L (ref 135–144)
TOTAL IRON BINDING CAPACITY: 330 UG/DL (ref 250–450)
TOTAL PROTEIN: 6.6 G/DL (ref 6.4–8.3)
UNSATURATED IRON BINDING CAPACITY: 248 UG/DL (ref 112–347)
VITAMIN D 25-HYDROXY: 48.6 NG/ML (ref 30–100)
WBC # BLD: 6 K/UL (ref 3.5–11.3)
WBC # BLD: ABNORMAL 10*3/UL

## 2021-03-30 PROCEDURE — 83735 ASSAY OF MAGNESIUM: CPT

## 2021-03-30 PROCEDURE — 85025 COMPLETE CBC W/AUTO DIFF WBC: CPT

## 2021-03-30 PROCEDURE — 80053 COMPREHEN METABOLIC PANEL: CPT

## 2021-03-30 PROCEDURE — 82728 ASSAY OF FERRITIN: CPT

## 2021-03-30 PROCEDURE — 84425 ASSAY OF VITAMIN B-1: CPT

## 2021-03-30 PROCEDURE — 83550 IRON BINDING TEST: CPT

## 2021-03-30 PROCEDURE — 97110 THERAPEUTIC EXERCISES: CPT

## 2021-03-30 PROCEDURE — 36415 COLL VENOUS BLD VENIPUNCTURE: CPT

## 2021-03-30 PROCEDURE — 97012 MECHANICAL TRACTION THERAPY: CPT

## 2021-03-30 PROCEDURE — 83540 ASSAY OF IRON: CPT

## 2021-03-30 PROCEDURE — 82607 VITAMIN B-12: CPT

## 2021-03-30 PROCEDURE — 82306 VITAMIN D 25 HYDROXY: CPT

## 2021-03-30 PROCEDURE — 84100 ASSAY OF PHOSPHORUS: CPT

## 2021-03-30 PROCEDURE — G0283 ELEC STIM OTHER THAN WOUND: HCPCS

## 2021-03-30 PROCEDURE — 82746 ASSAY OF FOLIC ACID SERUM: CPT

## 2021-03-30 PROCEDURE — 84630 ASSAY OF ZINC: CPT

## 2021-03-30 NOTE — FLOWSHEET NOTE
[] [facility]) [facility] &  Therapy  [address]  [phone]  F: [phone] [] [address]  [place] [address]   [address]  P: [phone]  F: [phone] [x] [address] &  Therapy  [address]  P: [phone]  F: [phone] [] [address]  P: [phone]  F: [phone] [] [address]  [facility]   [address]   [place]: [phone]  F: [phone]      Physical Therapy Daily Treatment Note    Date:  3/30/2021  Patient Name:  Ashia Armenta    :  1980  MRN: [id number]  Physician: Cheyanne Arredondo MD                                 Insurance: [email or web address] 30 visits  Medical Diagnosis: Chronic low back pain, unspecified back pain laterality, unspecified whether sciatica present, Radicular leg pain, DDD  Rehab Codes: M54.5, G89.29 , M54.10 , M51.36  Onset Date: 21               Next 's appt.: prn  Visit# / total visits:      Cancels/No Shows: 0     Subjective:    Pain:  [x] Yes  [] No Location: LB, hip Pain Rating: (0-10 scale) 6/10  Pain altered Tx:  [x] No  [] Yes  Action:  Comments: Pt arrives with continued pain and states she did not experience any symptoms post previous visit. Pt reports pain management could not schedule her for an epidural injection until  with a second one following 2 weeks later. Modalities: HP/ES to LB/SIJ in supine. Pelvic traction 60+ lbs (30-40 on/10 off)  Precautions: thyroid Ca  Exercises: MET and shotgun maneuver prn.  MFR/TPr/DI to hip flexor, Gmed, piriformis prn  Exercise Reps/ Time Weight/ Level Comments    PRC ex 20x   x   MET 10\"x5 prn  x   HS stretch  3x30 sec   stool     DKTC 3x30 ea     x   piriformis 3x30 ea   Modified knee to opposite shoulder x   Hip flexor 3x30\" ea  EOB x   DLS:TrA contr, w/   March, w/ heel walk  10x  15x ea  5-10 sec  breathing x   bridges 10x2      Clam shells 15x                        scap retraction 15x 5-10 sec   x             Other:    Treatment Charges: Mins Units   [x]  Modalities HP/ES  PTX (66 Lbs) 15  15 1  1   [x]  Ther Exercise 15 1   []  Manual Therapy     []  Ther Activities     []  Aquatics     []  Vasocompression     []  Other     Total Treatment time 45 3       Assessment: [x] Progressing toward goals. Initiated session with MHP/ES to promote muscle relaxation and reduce pain. Continued with therapeutic exercise program as noted above with focus directed to core activation and stretching. Followed therex with PTx as pt expressed good tolerance and symptom improvement. Pt denies any increase in pain post tx and will continue to progress postural and core strength and stability. [x] No change. Trial pelvic traction, add cupping, DN prn  [x] Patient would continue to benefit from skilled physical therapy services in order to: address the following goals    STG: (to be met in 12 treatments)  1. ? Pain: Stabilize LB/LE pain and ensure optimal management of pain during all ADLs ( home, occupation, community and recreation)  2. ? ROM: Optimize Lumbar spine ROM for functional activity   3. ? Strength: R LE grossly 5/5 and demo good postural and core stability  4. ? Function:Pt to report improved sleep and ease w/ ADL's  5. Patient to be independent with home exercise program as demonstrated by performance with correct form without cues. LTG: (to be met in 18 treatments)  1. Able to complete adv ADL's and return to prev activity level w/o diffiuclty  2. Decr Oswestry score by 15% for improved overall function     Patient goals: ability to move around and sleep w/o pain    Pt.  Education:  [x] Yes  [] No  [x] Reviewed Prior HEP/Ed  Method of Education: [x] Verbal  [x] Demo  [] Written  Comprehension of Education:  [x] [employer] understanding. [x] Demonstrates understanding. [] Needs review. [x] Demonstrates/verbalizes HEP/Ed previously given. Plan: [x] Continue current frequency toward long and short term goals. [x] Specific Instructions for subsequent treatments: Progress as tolerated within POC. Time In: 9:02 am          Time Out: 10: 00 am    Electronically signed by:   Jil Crow [place]

## 2021-03-31 LAB
FOLATE: >20 NG/ML
VITAMIN B-12: 1300 PG/ML (ref 232–1245)

## 2021-04-01 ENCOUNTER — APPOINTMENT (OUTPATIENT)
Dept: PHYSICAL THERAPY | Facility: CLINIC | Age: 41
End: 2021-04-01
Payer: COMMERCIAL

## 2021-04-02 LAB — ZINC: 77.9 UG/DL (ref 60–120)

## 2021-04-03 LAB — VITAMIN B1 WHOLE BLOOD: 142 NMOL/L (ref 70–180)

## 2021-04-06 ENCOUNTER — HOSPITAL ENCOUNTER (OUTPATIENT)
Dept: PHYSICAL THERAPY | Facility: CLINIC | Age: 41
Setting detail: THERAPIES SERIES
Discharge: HOME | End: 2021-04-06
Payer: COMMERCIAL

## 2021-04-06 PROCEDURE — 97110 THERAPEUTIC EXERCISES: CPT

## 2021-04-06 PROCEDURE — 97140 MANUAL THERAPY 1/> REGIONS: CPT

## 2021-04-06 PROCEDURE — G0283 ELEC STIM OTHER THAN WOUND: HCPCS

## 2021-04-06 NOTE — FLOWSHEET NOTE
[] [facility]) [facility] &  Therapy  [address]  [phone]  F: [phone] [] [address]  [facility]   [address]  P: [phone]  F: [phone] [x] [address]   Outpatient Rehabilitation &  Therapy  [address]  P: [phone]  F: [phone] [] [address]  P: [phone]  F: [phone] [] [address]  [facility]   [address]   [place]: [phone]  F: [phone]      Physical Therapy Daily Treatment Note    Date:  2021  Patient Name:  Bebeto oCx    :  1980  MRN: [id number]  Physician: Carter Jaime MD                                 Insurance: [employer] 30 visits  Medical Diagnosis: Chronic low back pain, unspecified back pain laterality, unspecified whether sciatica present, Radicular leg pain, DDD  Rehab Codes: M54.5, G89.29 , M54.10 , M51.36  Onset Date: 21               Next 's appt.: prn  Visit# / total visits: 10/18     Cancels/No Shows: 0     Subjective:    Pain:  [x] Yes  [] No Location: LB, hip Pain Rating: (0-10 scale) 6/10  Pain altered Tx:  [x] No  [] Yes  Action:  Comments: Pt reports since starting therapy she notices her pain isn't as bad during the day, also not having as much of the radicular sx. However, still having significant pain at night, difficulty sleeping and feels she can't get in a comfortable position. Modalities: HP/ES to LB/SIJ in supine. Pelvic traction 60+ lbs (30-40 on/10 off) - not today  Precautions: thyroid Ca  Exercises: MET and shotgun maneuver prn.  MFR/TPr/DI to hip flexor, Gmed, piriformis prn  Exercise Reps/ Time Weight/ Level Comments    PRC ex 20x   x   MET 10\"x5 prn  x   HS stretch  3x30 sec   stool     DKTC 3x30 ea     x   piriformis 3x30 ea   Modified knee to opposite shoulder x   Hip flexor  3x30\" ea  EOB x   DLS:TrA contr, w/   March, w/ heel walk  10x  15x ea  5-10 sec  breathing x   bridges 10x2      Clam shells 15x                        scap retraction 15x 5-10 sec                Other:    Treatment Charges: Mins Units   [x]  Modalities HP/ES 15 1   [x]  Ther Exercise 15 1   [x]  Manual Therapy 15 1   []  Ther Activities     []  Aquatics     []  Vasocompression     []  Other     Total Treatment time 45 3       Assessment: [x] Progressing toward goals. Initiated session with MHP/ES to promote pain relief and muscle relaxation. Held traction today d/t pt feeling it hasn't made a difference in her sx, and focused treatment more on manual. Presented out of alignment, performed MET and shotgun maneuver without change. Significant tension noted in R hip flexor, glut med, and especially piriformis. Performed MFR/DI to all followed by gentle stretches. Pt reports slight decrease in pain to 5/10 post treatment. Assess response to manual and progress per tolerance. Discussed with pt sleeping positions to help with pain relief, and stretching throughout the day. [x] No change. Trial pelvic traction, add cupping, DN prn  [x] Patient would continue to benefit from skilled physical therapy services in order to: address the following goals    STG: (to be met in 12 treatments)  1. ? Pain: Stabilize LB/LE pain and ensure optimal management of pain during all ADLs ( home, occupation, community and recreation)  2. ? ROM: Optimize Lumbar spine ROM for functional activity   3. ? Strength: R LE grossly 5/5 and demo good postural and core stability  4. ? Function:Pt to report improved sleep and ease w/ ADL's  5. Patient to be independent with home exercise program as demonstrated by performance with correct form without cues. LTG: (to be met in 18 treatments)  1. Able to complete adv ADL's and return to prev activity level w/o diffiuclty  2.  Decr Oswestry score by 15% for improved overall function     Patient goals: ability to move around and sleep w/o pain    Pt. Education:  [x] Yes  [] No  [x] Reviewed Prior HEP/Ed  Method of Education: [x] Verbal  [x] Demo  [] Written  Comprehension of Education:  [x] Verbalizes understanding. [x] Demonstrates understanding. [] Needs review. [x] Demonstrates/verbalizes HEP/Ed previously given. Plan: [x] Continue current frequency toward long and short term goals. [x] Specific Instructions for subsequent treatments: Progress as tolerated within POC.        Time In: 5:05 pm          Time Out: 6:03 pm    Electronically signed by:  Lisseth Hilliard PTA

## 2021-04-09 ENCOUNTER — HOSPITAL ENCOUNTER (OUTPATIENT)
Dept: PHYSICAL THERAPY | Facility: CLINIC | Age: 41
Setting detail: THERAPIES SERIES
Discharge: HOME | End: 2021-04-09
Payer: COMMERCIAL

## 2021-04-09 NOTE — FLOWSHEET NOTE
[] [facility]) [facility] &  Therapy  [address]    [phone]  F: [phone]   [] [address]  [facility] 36   [address]  P: [phone]  F: [phone]  [] [address]  [address]  P: [phone]  F: [phone] [] [address]  P: [phone]  F: [phone]  [] [address]  [address] [facility] [address]   [address]   [place]: [phone]  F: [phone]   [] [place] [address] [facility] [address]  [place]: [phone]   F: [phone]     Physical Therapy Cancel/No Show note    Date: 2021  Patient: Kateryna South  : 1980  MRN: [id number]    Cancels/No Shows to date: 3    For today's appointment patient:    [x]  Cancelled    [] Rescheduled appointment    [] No-show     Reason given by patient:    []  Patient ill    []  Conflicting appointment    [] No transportation      [] Conflict with work    [] No reason given    [] Weather related    [] COVID-19    [x] Other:      Comments: Patient's child ill        [] Next appointment was confirmed    Electronically signed by: Marj Roberts

## 2021-04-13 ENCOUNTER — HOSPITAL ENCOUNTER (OUTPATIENT)
Dept: PHYSICAL THERAPY | Facility: CLINIC | Age: 41
Setting detail: THERAPIES SERIES
Discharge: HOME | End: 2021-04-13
Payer: COMMERCIAL

## 2021-04-13 PROCEDURE — G0283 ELEC STIM OTHER THAN WOUND: HCPCS

## 2021-04-13 PROCEDURE — 97110 THERAPEUTIC EXERCISES: CPT

## 2021-04-13 PROCEDURE — 97140 MANUAL THERAPY 1/> REGIONS: CPT

## 2021-04-13 NOTE — FLOWSHEET NOTE
[] [facility]) - [facility] &  Therapy  [address]  [phone]  F: [phone] [] [address]  [facility]   [address]  P: [phone]  F: [phone] [x] [address] &  Therapy  [address]  P: [phone]  F: [phone] [] [address]  P: [phone]  F: [phone] [] [address]  [facility]   [address]   [place]: [phone]  F: [phone]      Physical Therapy Daily Treatment Note    Date:  2021  Patient Name:  Buddy Zuniga    :  1980  MRN: [id number]  Physician: Greg Maldonado MD                                 Insurance: [employer] 30 visits  Medical Diagnosis: Chronic low back pain, unspecified back pain laterality, unspecified whether sciatica present, Radicular leg pain, DDD  Rehab Codes: M54.5, G89.29 , M54.10 , M51.36  Onset Date: 21               Next 's appt.: prn  Visit# / total visits:      Cancels/No Shows: 0     Subjective:    Pain:  [x] Yes  [] No Location: LB, hip Pain Rating: (0-10 scale) 5/10  Pain altered Tx:  [x] No  [] Yes  Action:  Comments: Pt arrives stating she was pretty sore after manual from last session however soreness subsided after a day and reports she felt better after. Pt reports her pain management appointment is 21 and hopes to find relief with sleeping. Modalities: HP/ES to LB/SIJ in supine. Pelvic traction 60+ lbs (30-40 on/10 off) - not today  Precautions: thyroid Ca  Exercises: MET and shotgun maneuver prn.  MFR/TPr/DI to hip flexor, Gmed, piriformis prn  Exercise Reps/ Time Weight/ Level Comments    PRC ex 20x   x   MET 10\"x5 prn  x   HS stretch  3x30 sec   stool     DKTC 3x30 ea     x   piriformis 3x30 ea   Modified knee to opposite shoulder x Hip flexor  3x30\" ea  EOB x   DLS:TrA contr, w/   March, w/ heel walk  10x  15x ea  5-10 sec  breathing    Held Heel Walk d/t R hip popping and clicking  x   bridges 10x2      Clam shells 15x                        scap retraction 15x 5-10 sec                Other:    Treatment Charges: Mins Units   [x]  Modalities HP/ES 15 1   [x]  Ther Exercise 15 1   [x]  Manual Therapy 15 1   []  Ther Activities     []  Aquatics     []  Vasocompression     []  Other     Total Treatment time 45 3       Assessment: [x] Progressing toward goals. Began session with MHP/ES in attempt to alleviate symptoms and promote flexibility. Continued with alignment correction utilizing MET and shotgun maneuver with no sig change assessed. Pt demo sig TTP palpated by PTA of R hip/piriformis with MFR/DI applied along with the incorporation of rollerstick to IT band to alleviate tightness. Pt reports soreness post session however reports it is \"a good sore\" and states pain level has slightly decreased. Pt claims C pillow seems to help when sleeping and demonstrates awareness of posture when standing for long periods of time. Will continue to progress as pt tolerates. [x] No change. Trial pelvic traction, add cupping, DN prn  [x] Patient would continue to benefit from skilled physical therapy services in order to: address the following goals    STG: (to be met in 12 treatments)  1. ? Pain: Stabilize LB/LE pain and ensure optimal management of pain during all ADLs ( home, occupation, community and recreation)  2. ? ROM: Optimize Lumbar spine ROM for functional activity   3. ? Strength: R LE grossly 5/5 and demo good postural and core stability  4. ? Function:Pt to report improved sleep and ease w/ ADL's  5. Patient to be independent with home exercise program as demonstrated by performance with correct form without cues. LTG: (to be met in 18 treatments)  1. Able to complete adv ADL's and return to prev activity level w/o diffiuclty  2.  Decr Oswestry score by 15% for improved overall function     Patient goals: ability to move around and sleep w/o pain    Pt. Education:  [x] Yes  [] No  [x] Reviewed Prior HEP/Ed  Method of Education: [x] Verbal  [x] Demo  [] Written  Comprehension of Education:  [x] Verbalizes understanding. [x] Demonstrates understanding. [] Needs review. [x] Demonstrates/verbalizes HEP/Ed previously given. Plan: [x] Continue current frequency toward long and short term goals. [x] Specific Instructions for subsequent treatments: Progress as tolerated within POC. Time In: 4:01 pm          Time Out: 4: 54 pm    Electronically signed by:   Anuj Mack [place]

## 2021-04-16 ENCOUNTER — APPOINTMENT (OUTPATIENT)
Dept: PHYSICAL THERAPY | Facility: CLINIC | Age: 41
End: 2021-04-16
Payer: COMMERCIAL

## 2021-04-18 ENCOUNTER — TELEPHONE (OUTPATIENT)
Dept: PAIN MANAGEMENT | Age: 41
End: 2021-04-18

## 2021-04-19 ENCOUNTER — HOSPITAL ENCOUNTER (OUTPATIENT)
Dept: LAB | Age: 41
Setting detail: SPECIMEN
Discharge: HOME | End: 2021-04-19
Payer: COMMERCIAL

## 2021-04-19 ENCOUNTER — HOSPITAL ENCOUNTER (OUTPATIENT)
Dept: PHYSICAL THERAPY | Facility: CLINIC | Age: 41
Setting detail: THERAPIES SERIES
Discharge: HOME | End: 2021-04-19
Payer: COMMERCIAL

## 2021-04-19 DIAGNOSIS — Z01.818 PRE-OP TESTING: Primary | ICD-10-CM

## 2021-04-19 PROCEDURE — G0283 ELEC STIM OTHER THAN WOUND: HCPCS

## 2021-04-19 PROCEDURE — 97140 MANUAL THERAPY 1/> REGIONS: CPT

## 2021-04-19 PROCEDURE — 97110 THERAPEUTIC EXERCISES: CPT

## 2021-04-19 NOTE — FLOWSHEET NOTE
[] [facility]) - [facility] &  Therapy  [address]  [phone]  F: [phone] [] [address]  [place] [address]   [address]  P: [phone]  F: [phone] [x] [address] &  Therapy  [address]  P: [phone]  F: [phone] [] [address]  P: [phone]  F: [phone] [] [address]  [facility]   [address]   [place]: [phone]  F: [phone]      Physical Therapy Daily Treatment Note    Date:  2021  Patient Name:  Óscar Dial    :  1980  MRN: [id number]  Physician: Perlita Kelley MD                                 Insurance: [facility] 30 visits  Medical Diagnosis: Chronic low back pain, unspecified back pain laterality, unspecified whether sciatica present, Radicular leg pain, DDD  Rehab Codes: M54.5, G89.29 , M54.10 , M51.36  Onset Date: 21               Next 's appt.: prn  Visit# / total visits:      Cancels/No Shows: 0     Subjective:    Pain:  [x] Yes  [] No Location: LB, hip Pain Rating: (0-10 scale) 5/10  Pain altered Tx:  [x] No  [] Yes  Action:  Comments: Feels she Is getting more relief from manual compared to traction. States she is sore following treatment but feels better a day later. Gets an injection on Friday from pain management. Modalities: HP/ES to LB/SIJ in supine. Pelvic traction 60+ lbs (30-40 on/10 off) - not today  Precautions: thyroid Ca  Exercises: MET and shotgun maneuver prn.  MFR/TPr/DI to hip flexor, Gmed, piriformis prn  Exercise Reps/ Time Weight/ Level Comments    PRC ex 20x   x   MET 10\"x5 prn  x   LTR 10x10\"   x   HS stretch  3x30 sec   stool     DKTC 3x30 ea     x   piriformis 3x30 ea   Modified knee to opposite shoulder x   Hip flexor  3x30\" ea  EOB x DLS:TrA contr, w/   March, w/ heel walk  10x  15x ea  5-10 sec  breathing    Held Heel Walk d/t R hip popping and clicking  x   bridges 10x2   x   Clam shells 15x                        scap retraction 15x 5-10 sec                Other:    Treatment Charges: Mins Units   [x]  Modalities HP/ES 15 1   [x]  Ther Exercise 15 1   [x]  Manual Therapy 15 1   []  Ther Activities     []  Aquatics     []  Vasocompression     []  Other     Total Treatment time 45 3       Assessment: [x] Progressing toward goals. Continued session with MHP/ES to promote pain relief and muscle relaxation prior to manual. Focused manual on RLE today, TTP throughout hip flexor distal>proximal as well as piriformis and glut med. Showing improvement in alignment, slight rotation noted. Added LTR with pt reporting a good stretch in her LB. Pt reports no change in sx post treatment. [x] No change. Trial pelvic traction, add cupping, DN prn  [x] Patient would continue to benefit from skilled physical therapy services in order to: address the following goals    STG: (to be met in 12 treatments)  1. ? Pain: Stabilize LB/LE pain and ensure optimal management of pain during all ADLs ( home, occupation, community and recreation)  2. ? ROM: Optimize Lumbar spine ROM for functional activity   3. ? Strength: R LE grossly 5/5 and demo good postural and core stability  4. ? Function:Pt to report improved sleep and ease w/ ADL's  5. Patient to be independent with home exercise program as demonstrated by performance with correct form without cues. LTG: (to be met in 18 treatments)  1. Able to complete adv ADL's and return to prev activity level w/o diffiuclty  2. Decr Oswestry score by 15% for improved overall function     Patient goals: ability to move around and sleep w/o pain    Pt. Education:  [x] Yes  [] No  [] Reviewed Prior HEP/Ed  Method of Education: [x] Verbal  [x] Demo  [] Written  Comprehension of Education:  [x] Verbalizes understanding.   [x] Demonstrates understanding. [] Needs review. [x] Demonstrates/verbalizes HEP/Ed previously given. Plan: [x] Continue current frequency toward long and short term goals. [x] Specific Instructions for subsequent treatments: Progress as tolerated within POC.        Time In: 1:02 pm          Time Out: 2:00 pm    Electronically signed by:  Lakshmi Amaya PTA

## 2021-04-21 ENCOUNTER — ANESTHESIA EVENT (OUTPATIENT)
Dept: OPERATING ROOM | Age: 41
End: 2021-04-21
Payer: COMMERCIAL

## 2021-04-22 ENCOUNTER — TELEPHONE (OUTPATIENT)
Dept: PAIN MANAGEMENT | Age: 41
End: 2021-04-22

## 2021-04-22 NOTE — TELEPHONE ENCOUNTER
Pt states she called earlier to confirm her procedure for tomorrow but was informed by staff that Jeana screening was not done and her procedure needed to be cancelled. Pt states she did complete COVID screening on Monday 4/19/21 but she went at 8:45 am. Epic orders do not show that specimen was collected or being tested. Pt states she has someone driving from out of town [facility] to take her to procedure tomorrow. Placed pt on hold and called [facility] lab and was informed that drive up testing is done at [facility]. [facility] and the direct all patients there as they only do inpatient screenings at [address] [place] [address]. Writer was transferred to [facility]. [facility] inpatient lab and was informed by  [facility] that pt did complete COVID screening, however, the specimen was rejected due to the patients date of birth not being on the specimen.

## 2021-04-22 NOTE — TELEPHONE ENCOUNTER
Pt notified that she will have a rapid COVID screening test done upon arrival to [facility] tomorrow morning at 11:00 am due to COVID specimen collected on 4/19/21 being rejected.  Pt verbalized understanding

## 2021-04-23 ENCOUNTER — APPOINTMENT (OUTPATIENT)
Dept: GENERAL RADIOLOGY | Age: 41
End: 2021-04-23
Attending: PAIN MEDICINE
Payer: COMMERCIAL

## 2021-04-23 ENCOUNTER — ANESTHESIA (OUTPATIENT)
Dept: OPERATING ROOM | Age: 41
End: 2021-04-23
Payer: COMMERCIAL

## 2021-04-23 ENCOUNTER — HOSPITAL ENCOUNTER (OUTPATIENT)
Age: 41
Setting detail: OUTPATIENT SURGERY
Discharge: HOME | End: 2021-04-23
Attending: PAIN MEDICINE | Admitting: PAIN MEDICINE
Payer: COMMERCIAL

## 2021-04-23 VITALS
RESPIRATION RATE: 12 BRPM | SYSTOLIC BLOOD PRESSURE: 122 MMHG | TEMPERATURE: 96.8 F | DIASTOLIC BLOOD PRESSURE: 64 MMHG | OXYGEN SATURATION: 100 %

## 2021-04-23 VITALS
RESPIRATION RATE: 15 BRPM | SYSTOLIC BLOOD PRESSURE: 121 MMHG | HEART RATE: 82 BPM | DIASTOLIC BLOOD PRESSURE: 72 MMHG | HEIGHT: 65 IN | BODY MASS INDEX: 25.46 KG/M2 | WEIGHT: 152.8 LBS | TEMPERATURE: 98.7 F | OXYGEN SATURATION: 99 %

## 2021-04-23 LAB
HCG, PREGNANCY URINE (POC): NEGATIVE
HCG, PREGNANCY URINE (POC): NEGATIVE
SARS-COV-2, RAPID: NOT DETECTED
SPECIMEN DESCRIPTION: NORMAL

## 2021-04-23 PROCEDURE — 2709999900 HC NON-CHARGEABLE SUPPLY: Performed by: PAIN MEDICINE

## 2021-04-23 PROCEDURE — 3209999900 FLUORO FOR SURGICAL PROCEDURES

## 2021-04-23 PROCEDURE — 82947 ASSAY GLUCOSE BLOOD QUANT: CPT

## 2021-04-23 PROCEDURE — 7100000011 HC PHASE II RECOVERY - ADDTL 15 MIN: Performed by: PAIN MEDICINE

## 2021-04-23 PROCEDURE — 3700000000 HC ANESTHESIA ATTENDED CARE: Performed by: PAIN MEDICINE

## 2021-04-23 PROCEDURE — 64483 NJX AA&/STRD TFRM EPI L/S 1: CPT | Performed by: PAIN MEDICINE

## 2021-04-23 PROCEDURE — 81025 URINE PREGNANCY TEST: CPT

## 2021-04-23 PROCEDURE — 6360000004 HC RX CONTRAST MEDICATION: Performed by: PAIN MEDICINE

## 2021-04-23 PROCEDURE — 7100000010 HC PHASE II RECOVERY - FIRST 15 MIN: Performed by: PAIN MEDICINE

## 2021-04-23 PROCEDURE — 6360000002 HC RX W HCPCS: Performed by: ANESTHESIOLOGY

## 2021-04-23 PROCEDURE — 87635 SARS-COV-2 COVID-19 AMP PRB: CPT

## 2021-04-23 PROCEDURE — 64484 NJX AA&/STRD TFRM EPI L/S EA: CPT | Performed by: PAIN MEDICINE

## 2021-04-23 PROCEDURE — 3600000002 HC SURGERY LEVEL 2 BASE: Performed by: PAIN MEDICINE

## 2021-04-23 PROCEDURE — 6360000002 HC RX W HCPCS: Performed by: PAIN MEDICINE

## 2021-04-23 RX ORDER — MIDAZOLAM HYDROCHLORIDE 1 MG/ML
INJECTION INTRAMUSCULAR; INTRAVENOUS PRN
Status: DISCONTINUED | OUTPATIENT
Start: 2021-04-23 | End: 2021-04-23 | Stop reason: SDUPTHER

## 2021-04-23 RX ORDER — FENTANYL CITRATE 50 UG/ML
INJECTION, SOLUTION INTRAMUSCULAR; INTRAVENOUS PRN
Status: DISCONTINUED | OUTPATIENT
Start: 2021-04-23 | End: 2021-04-23 | Stop reason: SDUPTHER

## 2021-04-23 RX ORDER — SODIUM CHLORIDE 0.9 % (FLUSH) 0.9 %
10 SYRINGE (ML) INJECTION PRN
Status: DISCONTINUED | OUTPATIENT
Start: 2021-04-23 | End: 2021-04-23 | Stop reason: HOSPADM

## 2021-04-23 RX ORDER — DIPHENHYDRAMINE HYDROCHLORIDE 50 MG/ML
12.5 INJECTION INTRAMUSCULAR; INTRAVENOUS
Status: DISCONTINUED | OUTPATIENT
Start: 2021-04-23 | End: 2021-04-23 | Stop reason: HOSPADM

## 2021-04-23 RX ORDER — MEPERIDINE HYDROCHLORIDE 50 MG/ML
12.5 INJECTION INTRAMUSCULAR; INTRAVENOUS; SUBCUTANEOUS EVERY 5 MIN PRN
Status: DISCONTINUED | OUTPATIENT
Start: 2021-04-23 | End: 2021-04-23 | Stop reason: HOSPADM

## 2021-04-23 RX ORDER — MORPHINE SULFATE 1 MG/ML
1 INJECTION, SOLUTION EPIDURAL; INTRATHECAL; INTRAVENOUS EVERY 5 MIN PRN
Status: DISCONTINUED | OUTPATIENT
Start: 2021-04-23 | End: 2021-04-23 | Stop reason: HOSPADM

## 2021-04-23 RX ORDER — HYDROCODONE BITARTRATE AND ACETAMINOPHEN 5; 325 MG/1; MG/1
1 TABLET ORAL PRN
Status: DISCONTINUED | OUTPATIENT
Start: 2021-04-23 | End: 2021-04-23 | Stop reason: HOSPADM

## 2021-04-23 RX ORDER — FENTANYL CITRATE 50 UG/ML
25 INJECTION, SOLUTION INTRAMUSCULAR; INTRAVENOUS EVERY 5 MIN PRN
Status: DISCONTINUED | OUTPATIENT
Start: 2021-04-23 | End: 2021-04-23 | Stop reason: HOSPADM

## 2021-04-23 RX ORDER — SODIUM CHLORIDE 0.9 % (FLUSH) 0.9 %
10 SYRINGE (ML) INJECTION EVERY 12 HOURS SCHEDULED
Status: DISCONTINUED | OUTPATIENT
Start: 2021-04-23 | End: 2021-04-23 | Stop reason: HOSPADM

## 2021-04-23 RX ORDER — DEXAMETHASONE SODIUM PHOSPHATE 10 MG/ML
INJECTION INTRAMUSCULAR; INTRAVENOUS PRN
Status: DISCONTINUED | OUTPATIENT
Start: 2021-04-23 | End: 2021-04-23 | Stop reason: ALTCHOICE

## 2021-04-23 RX ORDER — PROMETHAZINE HYDROCHLORIDE 25 MG/ML
6.25 INJECTION, SOLUTION INTRAMUSCULAR; INTRAVENOUS
Status: DISCONTINUED | OUTPATIENT
Start: 2021-04-23 | End: 2021-04-23 | Stop reason: HOSPADM

## 2021-04-23 RX ORDER — HYDROCODONE BITARTRATE AND ACETAMINOPHEN 5; 325 MG/1; MG/1
2 TABLET ORAL PRN
Status: DISCONTINUED | OUTPATIENT
Start: 2021-04-23 | End: 2021-04-23 | Stop reason: HOSPADM

## 2021-04-23 RX ORDER — SODIUM CHLORIDE 9 MG/ML
25 INJECTION, SOLUTION INTRAVENOUS PRN
Status: DISCONTINUED | OUTPATIENT
Start: 2021-04-23 | End: 2021-04-23 | Stop reason: HOSPADM

## 2021-04-23 RX ORDER — ONDANSETRON 2 MG/ML
4 INJECTION INTRAMUSCULAR; INTRAVENOUS
Status: DISCONTINUED | OUTPATIENT
Start: 2021-04-23 | End: 2021-04-23 | Stop reason: HOSPADM

## 2021-04-23 RX ORDER — HYDRALAZINE HYDROCHLORIDE 20 MG/ML
5 INJECTION INTRAMUSCULAR; INTRAVENOUS EVERY 10 MIN PRN
Status: DISCONTINUED | OUTPATIENT
Start: 2021-04-23 | End: 2021-04-23 | Stop reason: HOSPADM

## 2021-04-23 RX ADMIN — FENTANYL CITRATE 100 MCG: 50 INJECTION, SOLUTION INTRAMUSCULAR; INTRAVENOUS at 12:30

## 2021-04-23 RX ADMIN — MIDAZOLAM HYDROCHLORIDE 2 MG: 1 INJECTION, SOLUTION INTRAMUSCULAR; INTRAVENOUS at 12:30

## 2021-04-23 ASSESSMENT — PULMONARY FUNCTION TESTS
PIF_VALUE: 0

## 2021-04-23 ASSESSMENT — PAIN - FUNCTIONAL ASSESSMENT: PAIN_FUNCTIONAL_ASSESSMENT: 0-10

## 2021-04-23 NOTE — H&P
Pain Pre-Op H&P Note    Óscar Galvez    HPI: Sneha Morales  presents with back and leg pain. Past Medical History:   Diagnosis Date    Asthma     Cancer [facility])     Thyroid    Controlled type 2 diabetes mellitus without complication, without long-term current use of insulin (HCC)     Thyroid cancer ([facility]        Past Surgical History:   Procedure Laterality Date     SECTION      TOTAL THYROIDECTOMY Bilateral 2017       Family History   Problem Relation Age of Onset    Other Mother         MTHFR Mutation    Diabetes Father         IDDM    Stroke Father     Heart Disease Father     Heart Attack Father     Arrhythmia Father     Arthritis Maternal Grandmother     Cancer Maternal Grandmother     Heart Failure Maternal Grandmother     Neuropathy Maternal Grandmother     Parkinsonism Maternal Grandfather     Arthritis Maternal Grandfather     Cancer Maternal Grandfather     High Blood Pressure Paternal Grandmother     Prostate Cancer Paternal Grandfather     Cancer Paternal Grandfather        No Known Allergies    No current facility-administered medications for this encounter. Facility-Administered Medications Ordered in Other Encounters:     thyrotropin dajuan (THYROGEN) injection 0.9 mg, 0.9 mg, Intramuscular, Once, Temple MD Mihir    Social History     Tobacco Use    Smoking status: Never Smoker    Smokeless tobacco: Never Used   Substance Use Topics    Alcohol use: No       Review of Systems:   Focused review of systems was performed, and negative as pertinent to diagnosis, except as stated in HPI. Physical Exam  Constitutional:       Appearance: Normal appearance. Pulmonary:      Effort: Pulmonary effort is normal.   Neurological:      Mental Status: He is alert.    Psychiatric:         Attention and Perception: Attention and perception normal.         Mood and Affect: Mood and affect normal.         Patient's current physical status, medications, medical history, and HPI have been reviewed and updated as appropriate on this date: 04/23/21    Risk/Benefit(s): The risks, benefits, alternatives, and potential complications have been discussed with the patient/family and informed consent has been obtained for the procedure/sedation.     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

## 2021-04-23 NOTE — ANESTHESIA PRE PROCEDURE
Department of Anesthesiology  Preprocedure Note       Name:  Goyo Tian   Age:  38 y.o.  :  1980                                          MRN:  [id number]         Date:  2021      Surgeon: Dorian Dodson):  Gary Benson MD    Procedure: Procedure(s):  LUMBAR TRANSFORAMINAL L4-5    Medications prior to admission:   Prior to Admission medications    Medication Sig Start Date End Date Taking?  Authorizing Provider   ibuprofen (IBU) 800 MG tablet Take 1 tablet by mouth every 8 hours as needed for Pain 3/29/21 6/27/21  Kalani Da Silva MD   Levothyroxine Sodium 112 MCG CAPS Take 112 mcg by mouth daily    Historical Provider, MD   omeprazole (PRILOSEC) 20 MG delayed release capsule TAKE 1 CAPSULE BY MOUTH EVERY DAY 21   Historical Provider, MD   glipiZIDE (GLUCOTROL) 10 MG tablet  21   Historical Provider, MD   alogliptin (NESINA) 25 MG TABS tablet TAKE 1 TABLET BY MOUTH EVERY DAY 21   Historical Provider, MD   metFORMIN (GLUCOPHAGE) 500 MG tablet Take 500 mg by mouth 3 times daily    Historical Provider, MD   liothyronine (CYTOMEL) 5 MCG tablet Take 5 mcg by mouth 2 times daily    Historical Provider, MD   Cetirizine HCl (ZYRTEC ALLERGY PO) Take by mouth    Historical Provider, MD   B Complex-Folic Acid (R-915 BALANCED TR PO) Take 1 tablet by mouth 3 times daily (before meals)    Historical Provider, MD   vitamin D (CHOLECALCIFEROL) 5000 UNITS CAPS capsule Take 5,000 Units by mouth daily    Historical Provider, MD   Calcium-Magnesium-Vitamin D 754-968-303 MG-MG-UNIT TABS Take 1 tablet by mouth 3 times daily (before meals)    Historical Provider, MD   Norgestim-Eth Estrad Triphasic (TRI-PREVIFEM) 0.18/0.215/0.25 MG-35 MCG TABS Take 1 tablet by mouth daily    Historical Provider, MD       Current medications:    Current Facility-Administered Medications   Medication Dose Route Frequency Provider Last Rate Last Admin    sodium chloride flush 0.9 % injection 10 mL  10 mL Intravenous 2 times per day Gay Judge Lucas MD        sodium chloride flush 0.9 % injection 10 mL  10 mL Intravenous PRN Luke Bell MD        0.9 % sodium chloride infusion  25 mL Intravenous PRN Luke Bell MD         Facility-Administered Medications Ordered in Other Encounters   Medication Dose Route Frequency Provider Last Rate Last Admin    thyrotropin dajuan (THYROGEN) injection 0.9 mg  0.9 mg Intramuscular Once Temple MD Mihir           Allergies:  No Known Allergies    Problem List:    Patient Active Problem List   Diagnosis Code    Asthma J45.909    Headache R51.9    Weight increase R63.5    Aches R52    Sleep difficulties G47.9    Tired R53.83    Mood change R45.86    Family history of MTHFR deficiency Z80.51    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    Vitamin D deficiency E55.9    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       Past Medical History:        Diagnosis Date    Asthma     Cancer ([facility]     Thyroid    Controlled type 2 diabetes mellitus without complication, without long-term current use of insulin ([facility]     Thyroid cancer ([facility]        Past Surgical History:        Procedure Laterality Date     SECTION      TOTAL THYROIDECTOMY Bilateral        Social History:    Social History     Tobacco Use    Smoking status: Never Smoker    Smokeless tobacco: Never Used   Substance Use Topics    Alcohol use: No                                Counseling given: Not Answered      Vital Signs (Current): There were no vitals filed for this visit.                                            BP Readings from Last 3 Encounters:   21 114/64   20 103/75   20 125/76       NPO Status:                                                                                 BMI:   Wt Readings from Last 3 Encounters:   21 144 lb (65.3 kg)   21 140 lb (63.5 kg)   21 140 lb (63.5 kg)     There is no height or weight on file to calculate BMI.    CBC:   Lab Results   Component Value Date    WBC 6.0 03/30/2021    RBC 4.07 03/30/2021    RBC 4.77 06/04/2015    HGB 11.5 03/30/2021    HCT 37.2 03/30/2021    MCV 91.4 03/30/2021    RDW 14.9 03/30/2021     03/30/2021       CMP:   Lab Results   Component Value Date     03/30/2021    K 4.5 03/30/2021     03/30/2021    CO2 26 03/30/2021    BUN 9 03/30/2021    CREATININE 0.51 03/30/2021    GFRAA >60 03/30/2021    LABGLOM >60 03/30/2021    GLUCOSE 157 03/30/2021    GLUCOSE 87 06/04/2015    PROT 6.6 03/30/2021    CALCIUM 9.1 03/30/2021    BILITOT 0.34 03/30/2021    BILITOT NEGATIVE 06/04/2015    ALKPHOS 45 03/30/2021    AST 16 03/30/2021    ALT 12 03/30/2021       POC Tests: No results for input(s): POCGLU, POCNA, POCK, POCCL, POCBUN, POCHEMO, POCHCT in the last 72 hours. Coags: No results found for: PROTIME, INR, APTT    HCG (If Applicable): Negative 0/79/74  Lab Results   Component Value Date    PREGTESTUR NEGATIVE 09/14/2020        ABGs: No results found for: PHART, PO2ART, MLE0TSG, HTT0GNU, BEART, D3YGMVQI     Type & Screen (If Applicable):  No results found for: LABABO, LABRH    Drug/Infectious Status (If Applicable):  No results found for: HIV, HEPCAB    COVID-19 Screening (If Applicable):   Lab Results   Component Value Date    COVID19 Not Detected 04/23/2021           Anesthesia Evaluation  Patient summary reviewed and Nursing notes reviewed no history of anesthetic complications:   Airway: Mallampati: I  TM distance: >3 FB   Neck ROM: full  Mouth opening: > = 3 FB Dental: normal exam         Pulmonary:normal exam  breath sounds clear to auscultation  (+) asthma:                            Cardiovascular:Negative CV ROS  Exercise tolerance: no interval change,           Rhythm: regular  Rate: normal                    Neuro/Psych:   (+) headaches:,              ROS comment: LUMBAR RADICULOPATHY  GI/Hepatic/Renal: Neg GI/Hepatic/Renal ROS            Endo/Other:    (+) DiabetesType II DM, well controlled, using insulin, malignancy/cancer. Abdominal:       Abdomen: soft. Vascular: negative vascular ROS. Anesthesia Plan      MAC     ASA 2             Anesthetic plan and risks discussed with patient. Plan discussed with CRNA.                   Nancy Ponce MD   4/23/2021

## 2021-04-23 NOTE — ANESTHESIA POSTPROCEDURE EVALUATION
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        Anesthesia Post Evaluation    Patient location during evaluation: PACU  Patient participation: complete - patient participated  Level of consciousness: awake and alert  Airway patency: patent  Nausea & Vomiting: no nausea and no vomiting  Complications: no  Cardiovascular status: hemodynamically stable  Respiratory status: room air and spontaneous ventilation  Hydration status: euvolemic

## 2021-04-23 NOTE — OP NOTE
Transforaminal Epidural Steroid Injection:  SURGEON: Feliciano Galvez    PRE-OP DIAGNOSIS: M54.17 (lumbosacral neuritis), M54.5 (low back pain)    POST-OP DIAGNOSIS: Same. PROCEDURE PERFORMED:  Right L4 and L5 Lumbar Transforaminal FAZAL selective nerve root block. Physician confirmed and marked the surgical site. EBL: minimal    CONSENT: Patient has undergone the educational process with this procedure, is aware and fully understands the risks involved: potential damage to any and all body organs including possible bleeding, infection, and nerve injury, allergic reaction and headache. Patient also understands that the procedure will be undertaken in a safe, controlled and monitored setting. Patient recognizes that the benefits may include relief from pain and reduction in the oral use of medications. Patient agreed to proceed. The patient was counseled at length about the risks of grazyna Covid-19 during their perioperative period and any recovery window from their procedure. The patient was made aware that grazyna Covid-19  may worsen their prognosis for recovering from their procedure  and lend to a higher morbidity and/or mortality risk. All material risks, benefits, and reasonable alternatives including postponing the procedure were discussed. The patient does wish to proceed with the procedure at this time. PREP: The patient was placed in the prone position and padded appropriately. The injection site was prepped with chloroprep and draped appropriately. 5ml of 0.5% lidocaine was used to anesthetize the skin and subcutaneous tissue. PROCEDURE NOTE: A 22 gauge 3.5 inch Pencil-Point needle was then advanced to the Right L4 and L5 neuroforamen using a wide paramedian approach under fluoroscopic guidance. Aspiration was negative for blood, CSF and producing pain.  Contrast Medium: 1 ml of (omnipaque) contrast was then injected and the following was noted: appropriate spread of contrast along the nerve root sheath and adjacent epidural space 2.5mg Dexamethasone mixed with 1.5 ml of 0.5 % lidocaine was then injected into the nerve root sheath of each of the indicated levels. The needle was withdrawn by the physician and the nurse applied a sterile dressing. The patient tolerated the procedure well. No complications occured. Patient transferred to the recovery room in satisfatory condition. Appropriate written discharge instructions given to the patient.     [facility]

## 2021-04-25 DIAGNOSIS — G89.29 CHRONIC LOW BACK PAIN, UNSPECIFIED BACK PAIN LATERALITY, UNSPECIFIED WHETHER SCIATICA PRESENT: ICD-10-CM

## 2021-04-25 DIAGNOSIS — M54.50 CHRONIC LOW BACK PAIN, UNSPECIFIED BACK PAIN LATERALITY, UNSPECIFIED WHETHER SCIATICA PRESENT: ICD-10-CM

## 2021-04-26 ENCOUNTER — HOSPITAL ENCOUNTER (OUTPATIENT)
Dept: PHYSICAL THERAPY | Facility: CLINIC | Age: 41
Setting detail: THERAPIES SERIES
Discharge: HOME | End: 2021-04-26
Payer: COMMERCIAL

## 2021-04-26 LAB — GLUCOSE BLD-MCNC: 173 MG/DL (ref 65–105)

## 2021-04-26 RX ORDER — IBUPROFEN 800 MG/1
TABLET ORAL
Qty: 90 TABLET | Refills: 0 | Status: SHIPPED | OUTPATIENT
Start: 2021-04-26 | End: 2021-06-01

## 2021-04-26 NOTE — TELEPHONE ENCOUNTER
Pt requesting refill on ibuprofen 800 MG tab. Patient last seen 2/18/21 for chronic low back pain. Patient had a surgical procedure 4/23/21 with pain management .

## 2021-04-29 ENCOUNTER — HOSPITAL ENCOUNTER (OUTPATIENT)
Dept: PHYSICAL THERAPY | Facility: CLINIC | Age: 41
Setting detail: THERAPIES SERIES
Discharge: HOME | End: 2021-04-29
Payer: COMMERCIAL

## 2021-04-29 PROCEDURE — 97110 THERAPEUTIC EXERCISES: CPT

## 2021-04-29 PROCEDURE — 97140 MANUAL THERAPY 1/> REGIONS: CPT

## 2021-04-29 PROCEDURE — G0283 ELEC STIM OTHER THAN WOUND: HCPCS

## 2021-04-29 NOTE — FLOWSHEET NOTE
[] [facility]) - [facility] &  Therapy  [address]  [phone]  F: [phone] [] [address]  [facility]   [address]  P: [phone]  F: [phone] [x] [address] &  Therapy  [address]  P: [phone]  F: [phone] [] [address]  P: [phone]  F: [phone] [] [address]  [facility]   [address]   [place]: [phone]  F: [phone]      Physical Therapy Daily Treatment Note    Date:  2021  Patient Name:  Eneida Cornejo    :  1980  MRN: [id number]  Physician: Jose Leon MD                                 Insurance: [facility] 30 visits  Medical Diagnosis: Chronic low back pain, unspecified back pain laterality, unspecified whether sciatica present, Radicular leg pain, DDD  Rehab Codes: M54.5, G89.29 , M54.10 , M51.36  Onset Date: 21               Next 's appt.: prn  Visit# / total visits:      Cancels/No Shows: 0     Subjective:    Pain:  [x] Yes  [] No Location: LB, hip Pain Rating: (0-10 scale) 6/10  Pain altered Tx:  [x] No  [] Yes  Action:  Comments: Pt reports she received an injection in her back from pain management. States her pain increased to 10/10, couldn't stand for more than 2 minutes d/t shooting pain down the RLE. After 48 hours pain decreased to previous levels, overall had no relief. Gets next injection on May 21. \"The doctor told me I would need 2 injections to see a difference. \"    Modalities: HP/ES to LB/SIJ in supine. Pelvic traction 60+ lbs (30-40 on/10 off) - not today  Precautions: thyroid Ca  Exercises: MET and shotgun maneuver prn.  MFR/TPr/DI to hip flexor, Gmed, piriformis prn  Exercise Reps/ Time Weight/ Level Comments    PRC ex 20x   x   MET 10\"x5 prn  x LTR 10x10\"   x   HS stretch  3x30 sec   stool     DKTC 3x30 ea     x   piriformis 3x30 ea   Modified knee to opposite shoulder x   Hip flexor  3x30\" ea  EOB x   DLS:TrA contr, w/   March, w/ heel walk  10x  15x ea  5-10 sec  breathing    Held Heel Walk d/t R hip popping and clicking  x   bridges 10x2   x   Clam shells 15x                        scap retraction 15x 5-10 sec                Other: Hypervolt to R glut med/piriformis     Treatment Charges: Mins Units   [x]  Modalities HP/ES 15 1   [x]  Ther Exercise 20 1   [x]  Manual Therapy 15 1   []  Ther Activities     []  Aquatics     []  Vasocompression     []  Other     Total Treatment time 50 3       Assessment: [x] Progressing toward goals. Initiated session with MHP/ES to LB to promote pain relief and muscle relaxation. Following estim performed R hip flexor relief, TTP proximal>distal. Also completed DI/STR to R glut med/piriformis and introduced hypervolt to the same area today with good tolerance. Pt reports feeling some relief in pain level post treatment. Emphasized HEP importance. [x] No change. Trial pelvic traction, add cupping, DN prn  [x] Patient would continue to benefit from skilled physical therapy services in order to: address the following goals    STG: (to be met in 12 treatments)  1. ? Pain: Stabilize LB/LE pain and ensure optimal management of pain during all ADLs ( home, occupation, community and recreation)  2. ? ROM: Optimize Lumbar spine ROM for functional activity   3. ? Strength: R LE grossly 5/5 and demo good postural and core stability  4. ? Function:Pt to report improved sleep and ease w/ ADL's  5. Patient to be independent with home exercise program as demonstrated by performance with correct form without cues. LTG: (to be met in 18 treatments)  1. Able to complete adv ADL's and return to prev activity level w/o diffiuclty  2.  Decr Oswestry score by 15% for improved overall function     Patient goals: ability to move around

## 2021-05-04 ENCOUNTER — HOSPITAL ENCOUNTER (OUTPATIENT)
Dept: PHYSICAL THERAPY | Facility: CLINIC | Age: 41
Setting detail: THERAPIES SERIES
Discharge: HOME | End: 2021-05-04
Payer: COMMERCIAL

## 2021-05-04 NOTE — FLOWSHEET NOTE
[] [facility]) [facility] &  Therapy  [address]    [phone]  F: [phone]   [] [address]  [facility]   [address]  P: [phone]  F: [phone]  [] [address]  [address]  P: [phone]  F: [phone] [] [address]  P: [phone]  F: [phone]  [] [address]  [facility]   [address]   [place]: [phone]  F: [phone]   [] [place] [address] [facility] [address]  [place]: [phone]   F: [phone]     Physical Therapy Cancel/No Show note    Date: 2021  Patient: Gary Rowe  : 1980  MRN: [id number]    Cancels/No Shows to date: 11    For today's appointment patient:    [x]  Cancelled    [] Rescheduled appointment    [] No-show     Reason given by patient:    []  Patient ill    []  Conflicting appointment    [] No transportation      [] Conflict with work    [] No reason given    [] Weather related    [] [id number]    [x] Other:  Family emergency, out of town    Comments:         [] Next appointment was confirmed    Electronically signed by: Danita Villa PTA

## 2021-05-13 ENCOUNTER — HOSPITAL ENCOUNTER (OUTPATIENT)
Dept: PHYSICAL THERAPY | Facility: CLINIC | Age: 41
Setting detail: THERAPIES SERIES
Discharge: HOME | End: 2021-05-13
Payer: COMMERCIAL

## 2021-05-13 PROCEDURE — 97110 THERAPEUTIC EXERCISES: CPT

## 2021-05-13 PROCEDURE — G0283 ELEC STIM OTHER THAN WOUND: HCPCS

## 2021-05-13 PROCEDURE — 97140 MANUAL THERAPY 1/> REGIONS: CPT

## 2021-05-13 NOTE — FLOWSHEET NOTE
[] [facility]) [facility] &  Therapy  [address]  [phone]  F: [phone] [] [address]  [facility]   [address]  P: [phone]  F: [phone] [x] [address]   [facility] &  Therapy  [address]  P: [phone]  F: [phone] [] [address]  P: [phone]  F: [phone] [] [address]  [facility]   [address]   [place]: [phone]  F: [phone]      Physical Therapy Daily Treatment Note    Date:  2021  Patient Name:  Juju Mcintosh    :  1980  MRN: [id number]  Physician: Petros Anand MD                                 Insurance: [employer] 30 visits  Medical Diagnosis: Chronic low back pain, unspecified back pain laterality, unspecified whether sciatica present, Radicular leg pain, DDD  Rehab Codes: M54.5, G89.29 , M54.10 , M51.36  Onset Date: 21               Next 's appt.: prn  Visit# / total visits:      Cancels/No Shows: 0     Subjective:    Pain:  [x] Yes  [] No Location: LB, hip Pain Rating: (0-10 scale) 6/10  Pain altered Tx:  [x] No  [] Yes  Action:  Comments: Pt reports continued pain through the LB and down RLE. States she gets relief when she has PT for a couple days, then the pain comes right back. States she gets her second back injection from pain management a week from today. Notes she has to leave by 8:50am today. Modalities: HP/ES to LB/SIJ in supine. Pelvic traction 60+ lbs (30-40 on/10 off) - not today  Precautions: thyroid Ca  Exercises: MET and shotgun maneuver prn.  MFR/TPr/DI to hip flexor, Gmed, piriformis prn  Exercise Reps/ Time Weight/ Level Comments    PRC ex 20x      MET 10\"x5 prn     LTR 10x10\"      HS stretch  3x30 sec   stool     DKTC 3x30 ea     x   piriformis 3x30 ea   Modified knee to opposite shoulder x   Hip flexor  3x30\" ea  EOB x   DLS:TrA contr, w/   March, w/ heel walk  10x  15x ea  5-10 sec  breathing    Held Heel Walk d/t R hip popping and clicking     bridges 36K8      Clam shells 15x                        scap retraction 15x 5-10 sec                Other: Hypervolt to R glut med/piriformis     Treatment Charges: Mins Units   [x]  Modalities HP/ES 15 1   [x]  Ther Exercise 15 1   [x]  Manual Therapy 15 1   []  Ther Activities     []  Aquatics     []  Vasocompression     []  Other     Total Treatment time 45 3       Assessment: [x] Progressing toward goals. Initiated session with MHP/ES to LB to promote pain relief and muscle relaxation. Following estim utilized hypervolt to B glut med/piriformis as well as performed R hip flexor release. Completed only stretches this date d/t pt needing to leave early to get her son to school. Reports feeling some relief post treatment. Reiterated importance of posture, especially while at work, as well as daily stretches. [x] No change. Trial pelvic traction, add cupping, DN prn  [x] Patient would continue to benefit from skilled physical therapy services in order to: address the following goals    STG: (to be met in 12 treatments)  1. ? Pain: Stabilize LB/LE pain and ensure optimal management of pain during all ADLs ( home, occupation, community and recreation)  2. ? ROM: Optimize Lumbar spine ROM for functional activity   3. ? Strength: R LE grossly 5/5 and demo good postural and core stability  4. ? Function:Pt to report improved sleep and ease w/ ADL's  5. Patient to be independent with home exercise program as demonstrated by performance with correct form without cues. LTG: (to be met in 18 treatments)  1. Able to complete adv ADL's and return to prev activity level w/o diffiuclty  2. Decr Oswestry score by 15% for improved overall function     Patient goals: ability to move around and sleep w/o pain    Pt.  Education: [x] Yes  [] No  [] Reviewed Prior HEP/Ed  Method of Education: [x] Verbal  [x] Demo  [] Written  Comprehension of Education:  [x] Verbalizes understanding. [x] Demonstrates understanding. [] Needs review. [x] Demonstrates/verbalizes HEP/Ed previously given. Plan: [x] Continue current frequency toward long and short term goals.     [x] Specific Instructions for subsequent treatments: Cont with POC at 1x per week      Time In: 8:00 am         Time Out: 8:52 am    Electronically signed by:  Naomy Gauthier PTA

## 2021-05-17 ENCOUNTER — HOSPITAL ENCOUNTER (OUTPATIENT)
Dept: LAB | Age: 41
Setting detail: SPECIMEN
Discharge: HOME | End: 2021-05-17
Payer: COMMERCIAL

## 2021-05-17 DIAGNOSIS — Z01.818 PRE-OP TESTING: Primary | ICD-10-CM

## 2021-05-17 PROCEDURE — U0005 INFEC AGEN DETEC AMPLI PROBE: HCPCS

## 2021-05-17 PROCEDURE — U0003 INFECTIOUS AGENT DETECTION BY NUCLEIC ACID (DNA OR RNA); SEVERE ACUTE RESPIRATORY SYNDROME CORONAVIRUS 2 (SARS-COV-2) (CORONAVIRUS DISEASE [COVID-19]), AMPLIFIED PROBE TECHNIQUE, MAKING USE OF HIGH THROUGHPUT TECHNOLOGIES AS DESCRIBED BY CMS-2020-01-R: HCPCS

## 2021-05-18 LAB
SARS-COV-2: NORMAL
SARS-COV-2: NOT DETECTED
SOURCE: NORMAL

## 2021-05-20 ENCOUNTER — HOSPITAL ENCOUNTER (OUTPATIENT)
Dept: PHYSICAL THERAPY | Facility: CLINIC | Age: 41
Setting detail: THERAPIES SERIES
Discharge: HOME | End: 2021-05-20
Payer: COMMERCIAL

## 2021-05-20 ENCOUNTER — ANESTHESIA EVENT (OUTPATIENT)
Dept: OPERATING ROOM | Age: 41
End: 2021-05-20
Payer: COMMERCIAL

## 2021-05-20 PROCEDURE — 97140 MANUAL THERAPY 1/> REGIONS: CPT

## 2021-05-20 PROCEDURE — G0283 ELEC STIM OTHER THAN WOUND: HCPCS

## 2021-05-20 PROCEDURE — 97110 THERAPEUTIC EXERCISES: CPT

## 2021-05-20 NOTE — FLOWSHEET NOTE
[] [facility]  The[facility]  [address]  [phone]  F: [phone] [] [address]  [facility]   [address]  P: [phone]  F: [phone] [x] [address]   [facility] &  Therapy  [address]  P: [phone]  F: [phone] [] [address]  P: [phone]  F: [phone] [] [address]  [facility]   [address]   [place]: [phone]  F: [phone]      Physical Therapy Daily Treatment Note    Date:  2021  Patient Name:  Silva Birmingham    :  1980  MRN: [id number]  Physician: Wilene Skiff, MD                                 Insurance: [facility] 30 visits  Medical Diagnosis: Chronic low back pain, unspecified back pain laterality, unspecified whether sciatica present, Radicular leg pain, DDD  Rehab Codes: M54.5, G89.29 , M54.10 , M51.36  Onset Date: 21               Next 's appt.: prn  Visit# / total visits: 15/18     Cancels/No Shows: 0     Subjective:    Pain:  [x] Yes  [] No Location: LB, hip Pain Rating: (0-10 scale) 4/10  Pain altered Tx:  [x] No  [] Yes  Action:  Comments: Pt states she had some relief following PT last session and noted that her pain level was decreased most of the week. Not waking up as much in the middle of the night d/t pain. Gets second injection for her back tomorrow. Modalities: HP/ES to LB/SIJ in supine. Pelvic traction 60+ lbs (30-40 on/10 off) - not today  Precautions: thyroid Ca  Exercises: MET and shotgun maneuver prn.  MFR/TPr/DI to hip flexor, Gmed, piriformis prn  Exercise Reps/ Time Weight/ Level Comments    PRC ex 20x   x   MET 10\"x5 prn  x   LTR 10x10\"      HS stretch  3x30 sec   stool     DKTC 3x30 ea     x   piriformis 3x30 ea   Modified knee to opposite shoulder x Hip flexor  3x30\" ea  EOB x   DLS:TrA contr, w/   March, w/ heel walk  10x  15x ea  5-10 sec  breathing    Held Heel Walk d/t R hip popping and clicking     bridges 24H0      Clam shells 15x                        scap retraction 15x 5-10 sec                Other: Hypervolt to R glut med/piriformis     Treatment Charges: Mins Units   [x]  Modalities HP/ES 15 1   [x]  Ther Exercise 15 1   [x]  Manual Therapy 15 1   []  Ther Activities     []  Aquatics     []  Vasocompression     []  Other     Total Treatment time 45 3       Assessment: [x] Progressing toward goals. Presents out of alignment this date with improvement noted after correction. Cont with MHP/ES followed by manual. Focused on R glut med/piriformis as well as R hip flexor. Cont with stretches per log above with good tolerance. [x] No change. Trial pelvic traction, add cupping, DN prn  [x] Patient would continue to benefit from skilled physical therapy services in order to: address the following goals    STG: (to be met in 12 treatments)  1. ? Pain: Stabilize LB/LE pain and ensure optimal management of pain during all ADLs ( home, occupation, community and recreation)  2. ? ROM: Optimize Lumbar spine ROM for functional activity   3. ? Strength: R LE grossly 5/5 and demo good postural and core stability  4. ? Function:Pt to report improved sleep and ease w/ ADL's  5. Patient to be independent with home exercise program as demonstrated by performance with correct form without cues. LTG: (to be met in 18 treatments)  1. Able to complete adv ADL's and return to prev activity level w/o diffiuclty  2. Decr Oswestry score by 15% for improved overall function     Patient goals: ability to move around and sleep w/o pain    Pt. Education:  [x] Yes  [] No  [] Reviewed Prior HEP/Ed  Method of Education: [x] Verbal  [x] Demo  [] Written  Comprehension of Education:  [x] Verbalizes understanding. [x] Demonstrates understanding. [] Needs review.   [x] Demonstrates/verbalizes HEP/Ed previously given. Plan: [x] Continue current frequency toward long and short term goals.     [x] Specific Instructions for subsequent treatments: Cont with POC at 1x per week      Time In: 1:03 pm         Time Out: 2:55 pm    Electronically signed by:  Angella Banerjee PTA

## 2021-05-21 ENCOUNTER — ANESTHESIA (OUTPATIENT)
Dept: OPERATING ROOM | Age: 41
End: 2021-05-21
Payer: COMMERCIAL

## 2021-05-21 ENCOUNTER — APPOINTMENT (OUTPATIENT)
Dept: GENERAL RADIOLOGY | Age: 41
End: 2021-05-21
Attending: PAIN MEDICINE
Payer: COMMERCIAL

## 2021-05-21 ENCOUNTER — HOSPITAL ENCOUNTER (OUTPATIENT)
Age: 41
Setting detail: OUTPATIENT SURGERY
Discharge: HOME | End: 2021-05-21
Attending: PAIN MEDICINE | Admitting: PAIN MEDICINE
Payer: COMMERCIAL

## 2021-05-21 VITALS
HEIGHT: 66 IN | OXYGEN SATURATION: 100 % | TEMPERATURE: 98 F | RESPIRATION RATE: 17 BRPM | HEART RATE: 70 BPM | BODY MASS INDEX: 25.07 KG/M2 | DIASTOLIC BLOOD PRESSURE: 73 MMHG | WEIGHT: 156 LBS | SYSTOLIC BLOOD PRESSURE: 113 MMHG

## 2021-05-21 VITALS
SYSTOLIC BLOOD PRESSURE: 116 MMHG | TEMPERATURE: 96.8 F | OXYGEN SATURATION: 100 % | DIASTOLIC BLOOD PRESSURE: 63 MMHG | RESPIRATION RATE: 13 BRPM

## 2021-05-21 LAB — GLUCOSE BLD-MCNC: 158 MG/DL (ref 65–105)

## 2021-05-21 PROCEDURE — 64483 NJX AA&/STRD TFRM EPI L/S 1: CPT | Performed by: PAIN MEDICINE

## 2021-05-21 PROCEDURE — 2709999900 HC NON-CHARGEABLE SUPPLY: Performed by: PAIN MEDICINE

## 2021-05-21 PROCEDURE — 82947 ASSAY GLUCOSE BLOOD QUANT: CPT

## 2021-05-21 PROCEDURE — 64484 NJX AA&/STRD TFRM EPI L/S EA: CPT | Performed by: PAIN MEDICINE

## 2021-05-21 PROCEDURE — 7100000010 HC PHASE II RECOVERY - FIRST 15 MIN: Performed by: PAIN MEDICINE

## 2021-05-21 PROCEDURE — 3209999900 FLUORO FOR SURGICAL PROCEDURES

## 2021-05-21 PROCEDURE — 3600000002 HC SURGERY LEVEL 2 BASE: Performed by: PAIN MEDICINE

## 2021-05-21 PROCEDURE — 6360000002 HC RX W HCPCS: Performed by: ANESTHESIOLOGY

## 2021-05-21 PROCEDURE — 3700000001 HC ADD 15 MINUTES (ANESTHESIA): Performed by: PAIN MEDICINE

## 2021-05-21 PROCEDURE — 7100000011 HC PHASE II RECOVERY - ADDTL 15 MIN: Performed by: PAIN MEDICINE

## 2021-05-21 PROCEDURE — 3700000000 HC ANESTHESIA ATTENDED CARE: Performed by: PAIN MEDICINE

## 2021-05-21 PROCEDURE — 6360000004 HC RX CONTRAST MEDICATION: Performed by: PAIN MEDICINE

## 2021-05-21 PROCEDURE — 3600000012 HC SURGERY LEVEL 2 ADDTL 15MIN: Performed by: PAIN MEDICINE

## 2021-05-21 PROCEDURE — 6360000002 HC RX W HCPCS: Performed by: PAIN MEDICINE

## 2021-05-21 RX ORDER — SODIUM CHLORIDE 0.9 % (FLUSH) 0.9 %
10 SYRINGE (ML) INJECTION PRN
Status: DISCONTINUED | OUTPATIENT
Start: 2021-05-21 | End: 2021-05-21 | Stop reason: HOSPADM

## 2021-05-21 RX ORDER — ONDANSETRON 2 MG/ML
4 INJECTION INTRAMUSCULAR; INTRAVENOUS
Status: DISCONTINUED | OUTPATIENT
Start: 2021-05-21 | End: 2021-05-21 | Stop reason: HOSPADM

## 2021-05-21 RX ORDER — HYDROCODONE BITARTRATE AND ACETAMINOPHEN 5; 325 MG/1; MG/1
2 TABLET ORAL PRN
Status: DISCONTINUED | OUTPATIENT
Start: 2021-05-21 | End: 2021-05-21 | Stop reason: HOSPADM

## 2021-05-21 RX ORDER — FENTANYL CITRATE 50 UG/ML
INJECTION, SOLUTION INTRAMUSCULAR; INTRAVENOUS PRN
Status: DISCONTINUED | OUTPATIENT
Start: 2021-05-21 | End: 2021-05-21 | Stop reason: SDUPTHER

## 2021-05-21 RX ORDER — MEPERIDINE HYDROCHLORIDE 50 MG/ML
12.5 INJECTION INTRAMUSCULAR; INTRAVENOUS; SUBCUTANEOUS EVERY 5 MIN PRN
Status: DISCONTINUED | OUTPATIENT
Start: 2021-05-21 | End: 2021-05-21 | Stop reason: HOSPADM

## 2021-05-21 RX ORDER — DEXAMETHASONE SODIUM PHOSPHATE 10 MG/ML
INJECTION INTRAMUSCULAR; INTRAVENOUS PRN
Status: DISCONTINUED | OUTPATIENT
Start: 2021-05-21 | End: 2021-05-21 | Stop reason: ALTCHOICE

## 2021-05-21 RX ORDER — SODIUM CHLORIDE, SODIUM LACTATE, POTASSIUM CHLORIDE, CALCIUM CHLORIDE 600; 310; 30; 20 MG/100ML; MG/100ML; MG/100ML; MG/100ML
INJECTION, SOLUTION INTRAVENOUS CONTINUOUS
Status: DISCONTINUED | OUTPATIENT
Start: 2021-05-21 | End: 2021-05-21 | Stop reason: HOSPADM

## 2021-05-21 RX ORDER — SODIUM CHLORIDE 9 MG/ML
25 INJECTION, SOLUTION INTRAVENOUS PRN
Status: DISCONTINUED | OUTPATIENT
Start: 2021-05-21 | End: 2021-05-21 | Stop reason: HOSPADM

## 2021-05-21 RX ORDER — HYDRALAZINE HYDROCHLORIDE 20 MG/ML
5 INJECTION INTRAMUSCULAR; INTRAVENOUS EVERY 10 MIN PRN
Status: DISCONTINUED | OUTPATIENT
Start: 2021-05-21 | End: 2021-05-21 | Stop reason: HOSPADM

## 2021-05-21 RX ORDER — DIPHENHYDRAMINE HYDROCHLORIDE 50 MG/ML
12.5 INJECTION INTRAMUSCULAR; INTRAVENOUS
Status: DISCONTINUED | OUTPATIENT
Start: 2021-05-21 | End: 2021-05-21 | Stop reason: HOSPADM

## 2021-05-21 RX ORDER — HYDROCODONE BITARTRATE AND ACETAMINOPHEN 5; 325 MG/1; MG/1
1 TABLET ORAL PRN
Status: DISCONTINUED | OUTPATIENT
Start: 2021-05-21 | End: 2021-05-21 | Stop reason: HOSPADM

## 2021-05-21 RX ORDER — MORPHINE SULFATE 1 MG/ML
1 INJECTION, SOLUTION EPIDURAL; INTRATHECAL; INTRAVENOUS EVERY 5 MIN PRN
Status: DISCONTINUED | OUTPATIENT
Start: 2021-05-21 | End: 2021-05-21 | Stop reason: HOSPADM

## 2021-05-21 RX ORDER — MIDAZOLAM HYDROCHLORIDE 1 MG/ML
INJECTION INTRAMUSCULAR; INTRAVENOUS PRN
Status: DISCONTINUED | OUTPATIENT
Start: 2021-05-21 | End: 2021-05-21 | Stop reason: SDUPTHER

## 2021-05-21 RX ORDER — PROMETHAZINE HYDROCHLORIDE 25 MG/ML
6.25 INJECTION, SOLUTION INTRAMUSCULAR; INTRAVENOUS
Status: DISCONTINUED | OUTPATIENT
Start: 2021-05-21 | End: 2021-05-21 | Stop reason: HOSPADM

## 2021-05-21 RX ORDER — SODIUM CHLORIDE 0.9 % (FLUSH) 0.9 %
10 SYRINGE (ML) INJECTION EVERY 12 HOURS SCHEDULED
Status: DISCONTINUED | OUTPATIENT
Start: 2021-05-21 | End: 2021-05-21 | Stop reason: HOSPADM

## 2021-05-21 RX ORDER — FENTANYL CITRATE 50 UG/ML
25 INJECTION, SOLUTION INTRAMUSCULAR; INTRAVENOUS EVERY 5 MIN PRN
Status: DISCONTINUED | OUTPATIENT
Start: 2021-05-21 | End: 2021-05-21 | Stop reason: HOSPADM

## 2021-05-21 RX ORDER — LIDOCAINE HYDROCHLORIDE 10 MG/ML
1 INJECTION, SOLUTION EPIDURAL; INFILTRATION; INTRACAUDAL; PERINEURAL
Status: DISCONTINUED | OUTPATIENT
Start: 2021-05-21 | End: 2021-05-21 | Stop reason: HOSPADM

## 2021-05-21 RX ADMIN — FENTANYL CITRATE 100 MCG: 50 INJECTION, SOLUTION INTRAMUSCULAR; INTRAVENOUS at 10:48

## 2021-05-21 RX ADMIN — MIDAZOLAM HYDROCHLORIDE 2 MG: 1 INJECTION, SOLUTION INTRAMUSCULAR; INTRAVENOUS at 10:48

## 2021-05-21 ASSESSMENT — PULMONARY FUNCTION TESTS
PIF_VALUE: 0

## 2021-05-21 ASSESSMENT — PAIN SCALES - GENERAL
PAINLEVEL_OUTOF10: 0

## 2021-05-21 NOTE — OP NOTE
Transforaminal Epidural Steroid Injection:  SURGEON: Gary Benson MD    PRE-OP DIAGNOSIS: M54.17 (lumbosacral neuritis), M54.5 (low back pain)    POST-OP DIAGNOSIS: Same. PROCEDURE PERFORMED:  Right L4 and L5 Lumbar Transforaminal FAZAL selective nerve root block. Physician confirmed and marked the surgical site. EBL: minimal    CONSENT: Patient has undergone the educational process with this procedure, is aware and fully understands the risks involved: potential damage to any and all body organs including possible bleeding, infection, and nerve injury, allergic reaction and headache. Patient also understands that the procedure will be undertaken in a safe, controlled and monitored setting. Patient recognizes that the benefits may include relief from pain and reduction in the oral use of medications. Patient agreed to proceed. The patient was counseled at length about the risks of grazyna Covid-19 during their perioperative period and any recovery window from their procedure. The patient was made aware that grazyna Covid-19  may worsen their prognosis for recovering from their procedure  and lend to a higher morbidity and/or mortality risk. All material risks, benefits, and reasonable alternatives including postponing the procedure were discussed. The patient does wish to proceed with the procedure at this time. PREP: The patient was placed in the prone position and padded appropriately. The injection site was prepped with chloroprep and draped appropriately. 5ml of 0.5% lidocaine was used to anesthetize the skin and subcutaneous tissue. PROCEDURE NOTE: A 22 gauge 3.5 inch Pencil-Point needle was then advanced to the Right L4 and L5 neuroforamen using a wide paramedian approach under fluoroscopic guidance. Aspiration was negative for blood, CSF and producing pain.  Contrast Medium: 1 ml of (omnipaque) contrast was then injected and the following was noted: appropriate spread of contrast along the nerve root sheath and adjacent epidural space 4mg Dexamethasone mixed with 1.5 ml of 0.5 % lidocaine was then injected into the nerve root sheath of each of the indicated levels. The needle was withdrawn by the physician and the nurse applied a sterile dressing. The patient tolerated the procedure well. No complications occured. Patient transferred to the recovery room in satisfatory condition. Appropriate written discharge instructions given to the patient.     Frankey Cecil, MD

## 2021-05-21 NOTE — H&P
Pain Pre-Op H&P Note    Lamar Peres MD    HPI: Sneha Morales  presents with back and leg pain.     Past Medical History:   Diagnosis Date    Asthma     Cancer [facility])     Thyroid    Controlled type 2 diabetes mellitus without complication, without long-term current use of insulin (HCC)     Thyroid cancer ([facility] [address]        Past Surgical History:   Procedure Laterality Date     SECTION      NERVE BLOCK Right 2021    : LUMBAR TRANSFORAMINAL L4-5 (Right )    PAIN MANAGEMENT PROCEDURE Right 2021    LUMBAR TRANSFORAMINAL L4-5 performed by Lamar Peres MD at [address] Bilateral 2017       Family History   Problem Relation Age of Onset    Other Mother         MTHFR Mutation    Diabetes Father         IDDM    Stroke Father     Heart Disease Father     Heart Attack Father     Arrhythmia Father     Arthritis Maternal Grandmother     Cancer Maternal Grandmother     Heart Failure Maternal Grandmother     Neuropathy Maternal Grandmother     Parkinsonism Maternal Grandfather     Arthritis Maternal Grandfather     Cancer Maternal Grandfather     High Blood Pressure Paternal Grandmother     Prostate Cancer Paternal Grandfather     Cancer Paternal Grandfather        No Known Allergies      Current Facility-Administered Medications:     lactated ringers infusion, , Intravenous, Continuous, Bridget Emery MD    sodium chloride flush 0.9 % injection 10 mL, 10 mL, Intravenous, 2 times per day, Bridget Emery MD    sodium chloride flush 0.9 % injection 10 mL, 10 mL, Intravenous, PRN, Bridget Emery MD    0.9 % sodium chloride infusion, 25 mL, Intravenous, PRN, Bridget Emery MD    lidocaine PF 1 % injection 1 mL, 1 mL, Intradermal, Once PRN, Bridget Emery MD    dexamethasone (DECADRON) injection, , , PRN, Lamar Peres MD, 10 mg at 21 1036    iohexol (OMNIPAQUE 180) injection, , , PRN, Lamar Peres MD, 10 mL at 21 1036    Facility-Administered Medications Ordered in Other Encounters:     thyrotropin dajuan (THYROGEN) injection 0.9 mg, 0.9 mg, Intramuscular, Once, Montez Hodgkin, MD    Social History     Tobacco Use    Smoking status: Never Smoker    Smokeless tobacco: Never Used   Substance Use Topics    Alcohol use: No       Review of Systems:   Focused review of systems was performed, and negative as pertinent to diagnosis, except as stated in HPI. Physical Exam  Constitutional:       Appearance: Normal appearance. Pulmonary:      Effort: Pulmonary effort is normal.   Neurological:      Mental Status: He is alert. Psychiatric:         Attention and Perception: Attention and perception normal.         Mood and Affect: Mood and affect normal.         Patient's current physical status, medications, medical history, and HPI have been reviewed and updated as appropriate on this date: 05/21/21    Risk/Benefit(s): The risks, benefits, alternatives, and potential complications have been discussed with the patient/family and informed consent has been obtained for the procedure/sedation.     Diagnosis:   M54.16 LUMBAR RADICULOPATHY  M51.36  DEGENERATION OF LUMBAR INTERVERTEBRAL DISC      Plan: lumbar epidural        Soledad Tan MD

## 2021-05-21 NOTE — ANESTHESIA PRE PROCEDURE
Department of Anesthesiology  Preprocedure Note       Name:  Johny Herman   Age:  38 y.o.  :  1980                                          MRN:  [id number]         Date:  2021      Surgeon: Isabella Clarke):  Glenn Sim MD    Procedure: Procedure(s):  LUMBAR TRANSFORAMINAL L4/5    Medications prior to admission:   Prior to Admission medications    Medication Sig Start Date End Date Taking?  Authorizing Provider   ibuprofen (ADVIL;MOTRIN) 800 MG tablet TAKE 1 TABLET BY MOUTH EVERY EIGHT HOURS as needed for pain 21  Yes Mena Lopez MD   Levothyroxine Sodium 112 MCG CAPS Take 112 mcg by mouth daily   Yes Historical Provider, MD   omeprazole (PRILOSEC) 20 MG delayed release capsule TAKE 1 CAPSULE BY MOUTH EVERY DAY 21  Yes Historical Provider, MD   glipiZIDE (GLUCOTROL) 10 MG tablet  21  Yes Historical Provider, MD   alogliptin (NESINA) 25 MG TABS tablet TAKE 1 TABLET BY MOUTH EVERY DAY 21  Yes Historical Provider, MD   metFORMIN (GLUCOPHAGE) 500 MG tablet Take 500 mg by mouth 3 times daily   Yes Historical Provider, MD   liothyronine (CYTOMEL) 5 MCG tablet Take 5 mcg by mouth 2 times daily   Yes Historical Provider, MD   Cetirizine HCl (ZYRTEC ALLERGY PO) Take by mouth   Yes Historical Provider, MD   B Complex-Folic Acid (G-145 BALANCED TR PO) Take 1 tablet by mouth 3 times daily (before meals)   Yes Historical Provider, MD   vitamin D (CHOLECALCIFEROL) 5000 UNITS CAPS capsule Take 5,000 Units by mouth daily   Yes Historical Provider, MD   Calcium-Magnesium-Vitamin D 500-821-569 MG-MG-UNIT TABS Take 1 tablet by mouth 3 times daily (before meals)   Yes Historical Provider, MD   Norgestim-Eth Estrad Triphasic (TRI-PREVIFEM) 0.18/0.215/0.25 MG-35 MCG TABS Take 1 tablet by mouth daily   Yes Historical Provider, MD       Current medications:    Current Facility-Administered Medications   Medication Dose Route Frequency Provider Last Rate Last Admin    lactated ringers infusion   Intravenous Continuous Sisi Soto MD        sodium chloride flush 0.9 % injection 10 mL  10 mL Intravenous 2 times per day Sisi Soto MD        sodium chloride flush 0.9 % injection 10 mL  10 mL Intravenous PRN Sisi Soto MD        0.9 % sodium chloride infusion  25 mL Intravenous PRN Sisi Soto MD        lidocaine PF 1 % injection 1 mL  1 mL Intradermal Once PRN Sisi Soto MD        dexamethasone (DECADRON) injection    PRN Virginia Resendiz MD   10 mg at 21 1042    iohexol (OMNIPAQUE 180) injection    PRN Virginia Resendiz MD   10 mL at 21 1042     Facility-Administered Medications Ordered in Other Encounters   Medication Dose Route Frequency Provider Last Rate Last Admin    midazolam (VERSED) injection   Intravenous PRN Sisi Soto MD   2 mg at 21 1048    fentaNYL (SUBLIMAZE) injection   Intravenous PRN Sisi Soto MD   100 mcg at 21 1048    thyrotropin dajuan (THYROGEN) injection 0.9 mg  0.9 mg Intramuscular Once Isabella Cooley MD           Allergies:  No Known Allergies    Problem List:    Patient Active Problem List   Diagnosis Code    Asthma J45.909    Headache R51.9    Weight increase R63.5    Aches R52    Sleep difficulties G47.9    Tired R53.83    Mood change R45.86    Family history of MTHFR deficiency Z80.51    Homozygous MTHFR mutation C677T ([facility] [address] E72.12    Vitamin D deficiency E55.9    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       Past Medical History:        Diagnosis Date    Asthma     Cancer ([facility] [address]     Thyroid    Controlled type 2 diabetes mellitus without complication, without long-term current use of insulin ([facility] [address]     Thyroid cancer ([facility] [address]        Past Surgical History:        Procedure Laterality Date     SECTION      NERVE BLOCK Right 2021    : LUMBAR TRANSFORAMINAL L4-5 (Right )    PAIN MANAGEMENT PROCEDURE Right 2021    LUMBAR TRANSFORAMINAL L4-5 performed by Virginia Resendiz MD at [facility] NEGATIVE 04/23/2021        ABGs: No results found for: PHART, PO2ART, PVO2LNQ, CWB5XPL, BEART, D4JXFXDB     Type & Screen (If Applicable):  No results found for: LABABO, LABRH    Drug/Infectious Status (If Applicable):  No results found for: HIV, HEPCAB    COVID-19 Screening (If Applicable):   Lab Results   Component Value Date    COVID19 Not Detected 05/17/2021           Anesthesia Evaluation  Patient summary reviewed and Nursing notes reviewed no history of anesthetic complications:   Airway: Mallampati: II  TM distance: >3 FB   Neck ROM: full  Mouth opening: > = 3 FB Dental: normal exam         Pulmonary:normal exam  breath sounds clear to auscultation  (+) asthma:                            Cardiovascular:Negative CV ROS            Rhythm: regular  Rate: normal                    Neuro/Psych:   (+) headaches: migraine headaches,             GI/Hepatic/Renal: Neg GI/Hepatic/Renal ROS            Endo/Other:    (+) DiabetesType II DM, no interval change, , malignancy/cancer. Abdominal:       Abdomen: soft. Vascular: negative vascular ROS. Anesthesia Plan      MAC     ASA 2             Anesthetic plan and risks discussed with patient. Plan discussed with CRNA.                   Perla Parker MD   5/21/2021

## 2021-05-27 ENCOUNTER — HOSPITAL ENCOUNTER (OUTPATIENT)
Dept: PHYSICAL THERAPY | Facility: CLINIC | Age: 41
Setting detail: THERAPIES SERIES
Discharge: HOME | End: 2021-05-27
Payer: COMMERCIAL

## 2021-05-27 PROCEDURE — 97140 MANUAL THERAPY 1/> REGIONS: CPT

## 2021-05-27 PROCEDURE — G0283 ELEC STIM OTHER THAN WOUND: HCPCS

## 2021-05-27 PROCEDURE — 97110 THERAPEUTIC EXERCISES: CPT

## 2021-05-27 NOTE — FLOWSHEET NOTE
[] [facility]) - [facility] &  Therapy  [address]  [phone]  F: [phone] [] [address]  [facility]   [address]  P: [phone]  F: [phone] [x] [address]  Outpatient Rehabilitation &  Therapy  [address]  P: [phone]  F: [phone] [] [address]  P: [phone]  F: [phone] [] [address]  [facility]   [address]   [place]: [phone]  F: [phone]      Physical Therapy Daily Treatment Note    Date:  2021  Patient Name:  Bebeto Cox    :  1980  MRN: [id number]  Physician: Carter Jaime MD                                 Insurance: [facility] 30 visits  Medical Diagnosis: Chronic low back pain, unspecified back pain laterality, unspecified whether sciatica present, Radicular leg pain, DDD  Rehab Codes: M54.5, G89.29 , M54.10 , M51.36  Onset Date: 21               Next 's appt.: prn  Visit# / total visits:      Cancels/No Shows: 0     Subjective:    Pain:  [x] Yes  [] No Location: LB, hip Pain Rating: (0-10 scale) 3/10  Pain altered Tx:  [x] No  [] Yes  Action:  Comments: Pt received her second injection from pain management and states she is overall feeling less pain. Notes sleeping is better  And not having radicular sx as frequently. Modalities: HP/ES to LB/SIJ in supine. Pelvic traction 60+ lbs (30-40 on/10 off) - not today  Precautions: thyroid Ca  Exercises: MET and shotgun maneuver prn.  MFR/TPr/DI to hip flexor, Gmed, piriformis prn  Exercise Reps/ Time Weight/ Level Comments    PRC ex 20x   x   MET 10\"x5 prn     LTR 10x10\"      HS stretch  3x30 sec   stool     DKTC 3x30 ea     x   piriformis 3x30 ea   Modified knee to opposite shoulder x   Hip flexor  3x30\" ea  EOB x   DLS:TrA contr, w/ March, w/ heel walk  10x  15x ea  5-10 sec  breathing    Held Heel Walk d/t R hip popping and clicking     bridges 85L1   x   Clam shells 2x10   x                     scap retraction 15x 5-10 sec                Other: Hypervolt to R glut med/piriformis, hip flexor release    Treatment Charges: Mins Units   [x]  Modalities HP/ES 20 1   [x]  Ther Exercise 15 1   [x]  Manual Therapy 15 1   []  Ther Activities     []  Aquatics     []  Vasocompression     []  Other     Total Treatment time 50 3       Assessment: [x] Progressing toward goals. Pt presents in alignment today. Continued with MHP/ES followed by hypervolt to R glut med/piriformis and lumbar paraspinals. Also performed hip flexor release, more tender distal than proximal. Continued with stretches per log above with good tolerance. Encouraged completion of stretches multiple times a day at home. Was able to resume some hip strengthening today. Pt reports feeling good post treatment. Plan to initiate more strengthening at next session per tolerance. [] No change. Trial pelvic traction, add cupping, DN prn  [x] Patient would continue to benefit from skilled physical therapy services in order to: address the following goals    STG: (to be met in 12 treatments)  1. ? Pain: Stabilize LB/LE pain and ensure optimal management of pain during all ADLs ( home, occupation, community and recreation)  2. ? ROM: Optimize Lumbar spine ROM for functional activity   3. ? Strength: R LE grossly 5/5 and demo good postural and core stability  4. ? Function:Pt to report improved sleep and ease w/ ADL's  5. Patient to be independent with home exercise program as demonstrated by performance with correct form without cues. LTG: (to be met in 18 treatments)  1. Able to complete adv ADL's and return to prev activity level w/o diffiuclty  2. Decr Oswestry score by 15% for improved overall function     Patient goals: ability to move around and sleep w/o pain    Pt.  Education:  [x] Yes  [] No  [] Reviewed Prior HEP/Ed  Method of Education: [x] Verbal  [x] Demo  [] Written  Comprehension of Education:  [x] Verbalizes understanding. [x] Demonstrates understanding. [] Needs review. [x] Demonstrates/verbalizes HEP/Ed previously given. Plan: [x] Continue current frequency toward long and short term goals.     [x] Specific Instructions for subsequent treatments: Cont with POC at 1x per week      Time In: 1:00 pm         Time Out: 2:00 pm    Electronically signed by:  Leon Rizzo PTA

## 2021-05-31 DIAGNOSIS — G89.29 CHRONIC LOW BACK PAIN, UNSPECIFIED BACK PAIN LATERALITY, UNSPECIFIED WHETHER SCIATICA PRESENT: ICD-10-CM

## 2021-05-31 DIAGNOSIS — M54.50 CHRONIC LOW BACK PAIN, UNSPECIFIED BACK PAIN LATERALITY, UNSPECIFIED WHETHER SCIATICA PRESENT: ICD-10-CM

## 2021-06-01 RX ORDER — IBUPROFEN 800 MG/1
TABLET ORAL
Qty: 90 TABLET | Refills: 0 | Status: SHIPPED | OUTPATIENT
Start: 2021-06-01 | End: 2021-07-12

## 2021-06-01 NOTE — TELEPHONE ENCOUNTER
Last Visit Date:2/18/21  Future Appointments   Date Time Provider Rainer Walkeri   6/2/2021  1:00  [facility] [address] [facility]   6/7/2021 11:00 AM Kevan Machado PT ROBINSON Mukherjee PT [employer]           Patient Active Problem List:     Asthma     Headache     Weight increase     Aches     Sleep difficulties     Tired     Mood change     Family history of MTHFR deficiency     Homozygous MTHFR mutation C677T ([address]     Vitamin D deficiency     IgG Gliadin antibody positive

## 2021-06-02 ENCOUNTER — HOSPITAL ENCOUNTER (OUTPATIENT)
Dept: PHYSICAL THERAPY | Facility: CLINIC | Age: 41
Setting detail: THERAPIES SERIES
Discharge: HOME | End: 2021-06-02
Payer: COMMERCIAL

## 2021-06-02 PROCEDURE — G0283 ELEC STIM OTHER THAN WOUND: HCPCS

## 2021-06-02 PROCEDURE — 97140 MANUAL THERAPY 1/> REGIONS: CPT

## 2021-06-02 PROCEDURE — 97110 THERAPEUTIC EXERCISES: CPT

## 2021-06-02 NOTE — FLOWSHEET NOTE
[] [facility]) - [facility] &  Therapy  [address]  [phone]  F: [phone] [] [address]  [facility]   [address]  P: [phone]  F: [phone] [x] [address] &  Therapy  [address]  P: [phone]  F: [phone] [] [address]  P: [phone]  F: [phone] [] [address]  [facility]   [address]   [place]: [phone]  F: [phone]      Physical Therapy Daily Treatment Note    Date:  2021  Patient Name:  Chelsey Bailey    :  1980  MRN: [id number]  Physician: Joi Salinas MD                                 Insurance: [employer] 30 visits  Medical Diagnosis: Chronic low back pain, unspecified back pain laterality, unspecified whether sciatica present, Radicular leg pain, DDD  Rehab Codes: M54.5, G89.29 , M54.10 , M51.36  Onset Date: 21               Next 's appt.: prn  Visit# / total visits:      Cancels/No Shows: 0     Subjective:    Pain:  [x] Yes  [] No Location: LB, hip Pain Rating: (0-10 scale) 3/10  Pain altered Tx:  [x] No  [] Yes  Action:  Comments: Pt arrives stating overall she is finally starting to find some relief from pain. Pt reports the second injection \"went a lot better than the first\". Modalities: HP/ES to LB/SIJ in supine. Pelvic traction 60+ lbs (30-40 on/10 off) - not today  Precautions: thyroid Ca  Exercises: MET and shotgun maneuver prn.  MFR/TPr/DI to hip flexor, Gmed, piriformis prn  Exercise Reps/ Time Weight/ Level Comments    PRC ex 20x   x   MET 10\"x5 prn     LTR 10x10\"      HS stretch  3x30 sec   stool     DKTC 3x30 ea     x   piriformis 3x30 ea   Modified knee to opposite shoulder x   Hip flexor  3x30\" ea  EOB x   DLS:TrA contr, w/   March, w/ heel walk  10x  15x ea  5-10 sec  breathing    Held Heel Walk d/t R hip popping and clicking     Bridges 05Q0   x   Clam shells 2x10   x   SL Hip ABD 2x10   x              scap retraction 15x 5-10 sec                Other: Hypervolt to R glut med/piriformis, hip flexor release    Treatment Charges: Mins Units   [x]  Modalities HP/ES 20 1   [x]  Ther Exercise 18 1   [x]  Manual Therapy 10 1   []  Ther Activities     []  Aquatics     []  Vasocompression     []  Other     Total Treatment time 48 3       Assessment: [x] Progressing toward goals. Continued with modalities to LB/PF to promote relaxation and pain relief. Followed with Hypervolt to noted structures to reduce tension with good relief noted. Pt completed stretches per flow chart above prior to mat exercises; able to add sidelying hip abd for strengthening with good tolerance and technique demo'd. Pt reports compliance to HEP/stretching daily and will cont to progress as symptoms allow. [] No change. Trial pelvic traction, add cupping, DN prn  [x] Patient would continue to benefit from skilled physical therapy services in order to: address the following goals    STG: (to be met in 12 treatments)  1. ? Pain: Stabilize LB/LE pain and ensure optimal management of pain during all ADLs ( home, occupation, community and recreation)  2. ? ROM: Optimize Lumbar spine ROM for functional activity   3. ? Strength: R LE grossly 5/5 and demo good postural and core stability  4. ? Function:Pt to report improved sleep and ease w/ ADL's  5. Patient to be independent with home exercise program as demonstrated by performance with correct form without cues. LTG: (to be met in 18 treatments)  1. Able to complete adv ADL's and return to prev activity level w/o diffiuclty  2. Decr Oswestry score by 15% for improved overall function     Patient goals: ability to move around and sleep w/o pain    Pt.  Education:  [x] Yes  [] No  [] Reviewed Prior HEP/Ed  Method of Education: [x] Verbal  [x] Demo  [] Written  Comprehension of Education:  [x] Verbalizes understanding. [x] Demonstrates understanding. [] Needs review. [x] Demonstrates/verbalizes HEP/Ed previously given. Plan: [x] Continue current frequency toward long and short term goals. [x] Specific Instructions for subsequent treatments: Cont with POC at 1x per week      Time In: 1:03 pm         Time Out: 1:55 pm    Electronically signed by:   Adis Otoole

## 2021-06-07 ENCOUNTER — HOSPITAL ENCOUNTER (OUTPATIENT)
Dept: PHYSICAL THERAPY | Facility: CLINIC | Age: 41
Setting detail: THERAPIES SERIES
Discharge: HOME | End: 2021-06-07
Payer: COMMERCIAL

## 2021-06-07 PROCEDURE — 97530 THERAPEUTIC ACTIVITIES: CPT

## 2021-06-07 PROCEDURE — 97140 MANUAL THERAPY 1/> REGIONS: CPT

## 2021-06-07 PROCEDURE — 97110 THERAPEUTIC EXERCISES: CPT

## 2021-06-07 PROCEDURE — G0283 ELEC STIM OTHER THAN WOUND: HCPCS

## 2021-06-07 NOTE — PROGRESS NOTES
Stabilize LB/LE pain and ensure optimal management of pain during all ADLs ( home, occupation, community and recreation)- progressing towards  2. ? ROM: Optimize Lumbar spine ROM for functional activity- MET   3. ? Strength: R LE grossly 5/5 and demo good postural and core stability- MET for LE strength , progressing core stability  4. ? Function:Pt to report improved sleep and ease w/ ADL's- MET  5. Patient to be independent with home exercise program as demonstrated by performance with correct form without cues. - Ongoing  LTG: (to be met in 18 treatments)  1. Able to complete adv ADL's and return to prev activity level w/o diffiuclty  2. Decr Oswestry score by 15% for improved overall function    Treatment Plan:  [x] Therapeutic Exercise   32750  [] Iontophoresis: 4 mg/mL Dexamethasone Sodium Phosphate  mAmin  84865   [x] Therapeutic Activity  87153 [] Vasopneumatic cold with compression  02841    [] Gait Training   07425 [] Ultrasound   50487   [] Neuromuscular Re-education  91326 [x] Electrical Stimulation Unattended  19370   [x] Manual Therapy  88804 [] Electrical Stimulation Attended  63166   [x] Instruction in HEP  [x] Lumbar/Cervical Traction  26815   [] Aquatic Therapy   63100 [x] Cold/hotpack    [] Massage   04876      [] Dry Needling, 1 or 2 muscles  28713   [] Biofeedback, first 15 minutes   41264  [] Biofeedback, additional 15 minutes   38907 [] Dry Needling, 3 or more muscles  57788       Patient Status:     [x] Continue per initial plan of care. [x] Additional visits necessary to further progress towards goals and incr strengthening and function w/ recent decr in pain levels. [] Other:     Requested Frequency/Duration: 1-2 times per week for 8 treatments. Electronically signed by Jorje Jaime PT on 6/7/2021 at 11:34 AM      If you have any questions or concerns, please don't hesitate to call.   Thank you for your referral.    Physician Signature:________________________________Date:__________________  By signing above or cosigning this note, I have reviewed this plan of care and certify a need for medically necessary rehabilitation services.      *PLEASE SIGN ABOVE AND FAX BACK ALL PAGES*

## 2021-06-07 NOTE — FLOWSHEET NOTE
[] [facility]) - [facility] &  Therapy  [address]  [phone]  F: [phone] [] [address]  [address]   [address]  P: [phone]  F: [phone] [x] [address] &  Therapy  [address]  P: [phone]  F: [phone] [] [address]  P: [phone]  F: [phone] [] [address]  [facility]   [address]   [place]: [phone]  F: [phone]      Physical Therapy Daily Treatment Note    Date:  2021  Patient Name:  Kateryna South    :  1980  MRN: [id number]  Physician: Aristides Lanier MD                                 Insurance: [facility] 30 visits  Medical Diagnosis: Chronic low back pain, unspecified back pain laterality, unspecified whether sciatica present, Radicular leg pain, DDD  Rehab Codes: M54.5, G89.29 , M54.10 , M51.36  Onset Date: 21               Next 's appt.: prn  Visit# / total visits:      Cancels/No Shows: 0     Subjective:    Pain:  [x] Yes  [] No Location: LB, hip Pain Rating: (0-10 scale) 3/10  Pain altered Tx:  [x] No  [] Yes  Action:  Comments: Pt reports \"feeling good\" overall. States she still has pain but the intensity isn't as bad. Modalities: HP/ES to LB/SIJ in supine. Pelvic traction 60+ lbs (30-40 on/10 off) - not today  Precautions: thyroid Ca  Exercises: MET and shotgun maneuver prn.  MFR/TPr/DI to hip flexor, Gmed, piriformis prn  Exercise Reps/ Time Weight/ Level Comments    PRC ex 20x   x   MET 10\"x5 prn     LTR 10x10\"      HS stretch  3x30 sec   stool     DKTC 3x30 ea     x   piriformis 3x30 ea   Modified knee to opposite shoulder x   Hip flexor  3x30\" ea  EOB x   TrA w/ heel walk x10   x   TA w/ alt UE/LE x20   x   Bridges 2x15 orange  x   Clam shells 2x15 orange  x   SL Hip ABD 2x15 orange  x              scap retraction 15x 5-10 sec                Other: Hypervolt to R glut med/piriformis, hip flexor release    Treatment Charges: Mins Units   [x]  Modalities HP/ES 15 1   [x]  Ther Exercise 25 1   [x]  Manual Therapy 8 1   []  Ther Activities     []  Aquatics     []  Vasocompression     []  Other     Total Treatment time 48 3       Assessment: [x] Progressing toward goals. Cont to initiate session with MHP/ES followed by manual. Still TTP throughout R glut med/piriformis, however much improvement with palpation to hip flexor. Able to resume heel walks for core strengthening without any clicking in her hip. Addition of alt UE/LE as well to TA activation to progress core stability. Added tband to clamshells, hip abduction, and bridges. Plan to progress hip and core strengthening per tolerance. [] No change. [x] Patient would continue to benefit from skilled physical therapy services in order to: address the following goals    STG: (to be met in 12 treatments)  1. ? Pain: Stabilize LB/LE pain and ensure optimal management of pain during all ADLs ( home, occupation, community and recreation)  2. ? ROM: Optimize Lumbar spine ROM for functional activity   3. ? Strength: R LE grossly 5/5 and demo good postural and core stability  4. ? Function:Pt to report improved sleep and ease w/ ADL's  5. Patient to be independent with home exercise program as demonstrated by performance with correct form without cues. LTG: (to be met in 18 treatments)  1. Able to complete adv ADL's and return to prev activity level w/o diffiuclty  2. Decr Oswestry score by 15% for improved overall function     Patient goals: ability to move around and sleep w/o pain    Pt. Education:  [x] Yes  [] No  [] Reviewed Prior HEP/Ed  Method of Education: [x] Verbal  [x] Demo  [] Written  Comprehension of Education:  [x] Verbalizes understanding. [x] Demonstrates understanding. [] Needs review.   [x] Demonstrates/verbalizes HEP/Ed previously given. Plan: [x] Continue current frequency toward long and short term goals.     [x] Specific Instructions for subsequent treatments: Cont with POC at 1x per week      Time In: 11:00 am       Time Out: 11:57 am    Electronically signed by:  Frank Pelletier PTA

## 2021-06-14 ENCOUNTER — HOSPITAL ENCOUNTER (OUTPATIENT)
Dept: PHYSICAL THERAPY | Facility: CLINIC | Age: 41
Setting detail: THERAPIES SERIES
Discharge: HOME | End: 2021-06-14
Payer: COMMERCIAL

## 2021-06-14 PROCEDURE — 97110 THERAPEUTIC EXERCISES: CPT

## 2021-06-14 PROCEDURE — G0283 ELEC STIM OTHER THAN WOUND: HCPCS

## 2021-06-14 NOTE — FLOWSHEET NOTE
[] [facility]) - [facility] &  Therapy  [address]  [phone]  F: [phone] [] [address]  [facility]   [address]  P: [phone]  F: [phone] [x] [address] &  Therapy  [address]  P: [phone]  F: [phone] [] [address]  P: [phone]  F: [phone] [] [address]  [facility]   [address]   [place]: [phone]  F: [phone]      Physical Therapy Daily Treatment Note    Date:  2021  Patient Name:  Shelby Glover    :  1980  MRN: [id number]  Physician: Karie Ellis MD                                 Insurance: [facility] 30 visits  Medical Diagnosis: Chronic low back pain, unspecified back pain laterality, unspecified whether sciatica present, Radicular leg pain, DDD  Rehab Codes: M54.5, G89.29 , M54.10 , M51.36  Onset Date: 21               Next 's appt.: prn  Visit# / total visits:      Cancels/No Shows: 0     Subjective:    Pain:  [x] Yes  [] No Location: LB, hip Pain Rating: (0-10 scale) 2-3/10  Pain altered Tx:  [x] No  [] Yes  Action:  Comments: Pt reports sleep has gotten better and pain level continues to remain at a lower level. Modalities: HP/ES to LB/SIJ in supine. Pelvic traction 60+ lbs (30-40 on/10 off) - not today  Precautions: thyroid Ca  Exercises: MET and shotgun maneuver prn.  MFR/TPr/DI to hip flexor, Gmed, piriformis prn  Exercise Reps/ Time Weight/ Level Comments    PRC ex 20x   x   MET 10\"x5 prn     LTR 10x10\"      HS stretch  3x30 sec   stool     DKTC 3x30 ea        piriformis 3x30 ea   Modified knee to opposite shoulder x   Hip flexor  3x30\" ea  EOB x   TrA w/ heel walk x10   x   TA w/ alt UE/LE x20   x   Bridges on SB 2x15   x   Clam shells 2x15 orange  x   SL Hip ABD 2x15 orange  x              scap retraction 15x 5-10 sec              Gym       4 way hip x10 B lime  x   tband sidestepping 2L orange  x     Other: Hypervolt to R glut med/piriformis, hip flexor release    Treatment Charges: Mins Units   [x]  Modalities HP/ES 15 1   [x]  Ther Exercise 25 2   [x]  Manual Therapy 5 nc   []  Ther Activities     []  Aquatics     []  Vasocompression     []  Other     Total Treatment time 45 3       Assessment: [x] Progressing toward goals. Initiated session with MHP/ES followed by manual to R hip flexor and piriformis. Most TTP at sacral attachment point. Progressed strengthening with 4 way hip and tband sidestepping. Also added SB to bridges with good tolerance. Cues throughout for TA activation to aide in lumbar stability. No increase in sx noted post treatment. Cont to progress per tolerance. [] No change. [x] Patient would continue to benefit from skilled physical therapy services in order to: address the following goals    STG: (to be met in 12 treatments)  1. ? Pain: Stabilize LB/LE pain and ensure optimal management of pain during all ADLs ( home, occupation, community and recreation)  2. ? ROM: Optimize Lumbar spine ROM for functional activity   3. ? Strength: R LE grossly 5/5 and demo good postural and core stability  4. ? Function:Pt to report improved sleep and ease w/ ADL's  5. Patient to be independent with home exercise program as demonstrated by performance with correct form without cues. LTG: (to be met in 18 treatments)  1. Able to complete adv ADL's and return to prev activity level w/o diffiuclty  2. Decr Oswestry score by 15% for improved overall function     Patient goals: ability to move around and sleep w/o pain    Pt. Education:  [x] Yes  [] No  [] Reviewed Prior HEP/Ed  Method of Education: [x] Verbal  [x] Demo  [] Written  Comprehension of Education:  [x] Verbalizes understanding. [x] Demonstrates understanding.   [] Needs review. [x] Demonstrates/verbalizes HEP/Ed previously given. Plan: [x] Continue current frequency toward long and short term goals.     [x] Specific Instructions for subsequent treatments: Cont with POC at 1x per week      Time In: 2:00 pm       Time Out: 3:00 pm    Electronically signed by:  Shelley Santizo PTA

## 2021-06-21 ENCOUNTER — OFFICE VISIT (OUTPATIENT)
Dept: PAIN MANAGEMENT | Age: 41
End: 2021-06-21
Payer: COMMERCIAL

## 2021-06-21 ENCOUNTER — HOSPITAL ENCOUNTER (OUTPATIENT)
Dept: PHYSICAL THERAPY | Facility: CLINIC | Age: 41
Setting detail: THERAPIES SERIES
Discharge: HOME | End: 2021-06-21
Payer: COMMERCIAL

## 2021-06-21 VITALS
HEIGHT: 65 IN | WEIGHT: 154.2 LBS | SYSTOLIC BLOOD PRESSURE: 104 MMHG | DIASTOLIC BLOOD PRESSURE: 66 MMHG | HEART RATE: 88 BPM | BODY MASS INDEX: 25.69 KG/M2 | OXYGEN SATURATION: 97 % | RESPIRATION RATE: 16 BRPM

## 2021-06-21 DIAGNOSIS — M51.36 DEGENERATION OF LUMBAR INTERVERTEBRAL DISC: ICD-10-CM

## 2021-06-21 DIAGNOSIS — M54.16 LUMBAR RADICULOPATHY: Primary | ICD-10-CM

## 2021-06-21 PROCEDURE — G8427 DOCREV CUR MEDS BY ELIG CLIN: HCPCS | Performed by: PAIN MEDICINE

## 2021-06-21 PROCEDURE — G8419 CALC BMI OUT NRM PARAM NOF/U: HCPCS | Performed by: PAIN MEDICINE

## 2021-06-21 PROCEDURE — 97110 THERAPEUTIC EXERCISES: CPT

## 2021-06-21 PROCEDURE — 1036F TOBACCO NON-USER: CPT | Performed by: PAIN MEDICINE

## 2021-06-21 PROCEDURE — G0283 ELEC STIM OTHER THAN WOUND: HCPCS

## 2021-06-21 PROCEDURE — 99213 OFFICE O/P EST LOW 20 MIN: CPT | Performed by: PAIN MEDICINE

## 2021-06-21 RX ORDER — NYSTATIN 100000 [USP'U]/G
POWDER TOPICAL
COMMUNITY
Start: 2021-05-31

## 2021-06-21 RX ORDER — GABAPENTIN 300 MG/1
CAPSULE ORAL
COMMUNITY
Start: 2021-06-20

## 2021-06-21 RX ORDER — PIOGLITAZONEHYDROCHLORIDE 30 MG/1
TABLET ORAL
COMMUNITY
Start: 2021-06-02 | End: 2022-04-11

## 2021-06-21 RX ORDER — METHOCARBAMOL 750 MG/1
TABLET ORAL
COMMUNITY
Start: 2021-06-15 | End: 2022-04-11

## 2021-06-21 RX ORDER — TRIAMCINOLONE ACETONIDE 5 MG/G
CREAM TOPICAL
COMMUNITY
Start: 2021-04-23

## 2021-06-21 RX ORDER — LEVOTHYROXINE SODIUM 112 UG/1
TABLET ORAL
COMMUNITY
Start: 2021-06-06 | End: 2021-10-07

## 2021-06-21 RX ORDER — DESOGESTREL AND ETHINYL ESTRADIOL 0.15-0.03
KIT ORAL
COMMUNITY
Start: 2021-06-06

## 2021-06-21 RX ORDER — FERROUS SULFATE 325(65) MG
TABLET ORAL
COMMUNITY
Start: 2021-04-24

## 2021-06-21 ASSESSMENT — ENCOUNTER SYMPTOMS: BACK PAIN: 1

## 2021-06-21 NOTE — FLOWSHEET NOTE
[] [facility]) - [facility] &  Therapy  [address]  [phone]  F: [phone] [] [address]  [facility]   [address]  P: [phone]  F: [phone] [x] [address] &  Therapy  [address]  P: [phone]  F: [phone] [] [address]  P: [phone]  F: [phone] [] [address]  [facility]   [address]   [place]: [phone]  F: [phone]      Physical Therapy Daily Treatment Note    Date:  2021  Patient Name:  Chelsey Bailey    :  1980  MRN: [id number]  Physician: Joi Salinas MD                                 Insurance: [employer] 30 visits  Medical Diagnosis: Chronic low back pain, unspecified back pain laterality, unspecified whether sciatica present, Radicular leg pain, DDD  Rehab Codes: M54.5, G89.29 , M54.10 , M51.36  Onset Date: 21               Next 's appt.: prn  Visit# / total visits:      Cancels/No Shows: 0     Subjective:    Pain:  [x] Yes  [] No Location: LB, hip Pain Rating: (0-10 scale) 2-3/10  Pain altered Tx:  [x] No  [] Yes  Action:  Comments: Pt reports pain level remains about the same. Sees pain management later today. Modalities: HP/ES to LB/SIJ in supine. Pelvic traction 60+ lbs (30-40 on/10 off) - not today  Precautions: thyroid Ca  Exercises: MET and shotgun maneuver prn.  MFR/TPr/DI to hip flexor, Gmed, piriformis prn  Exercise Reps/ Time Weight/ Level Comments    PRC ex 20x   x   MET 10\"x5 prn     LTR 10x10\"      HS stretch  3x30 sec   stool     DKTC 3x30 ea        piriformis 3x30 ea   Modified knee to opposite shoulder x   Hip flexor  3x30\" ea  EOB x   TrA w/ heel walk x10   x   Deadbugs x20   x   Bridges on SB 2x15   x   SB Leg Lifts x20   x   Clam shells 2x15 lime  x   SL Hip ABD 2x15 lime  x              scap retraction 15x 5-10 sec              Gym       4 way hip x10 B lime  x   tband sidestepping 2L orange  x   Paloff Press x15 rec SC  x     Other: Hypervolt to R glut med/piriformis, hip flexor release - not today    Treatment Charges: Mins Units   [x]  Modalities HP/ES 15 1   [x]  Ther Exercise 30 2   []  Manual Therapy     []  Ther Activities     []  Aquatics     []  Vasocompression     []  Other     Total Treatment time 45 3       Assessment: [x] Progressing toward goals. Initiated session with MHP/ES followed by stretches. Progressed strengthening with paloff press, deadbugs, and leg lifts on SB. Also able to increase resistance throughout. Able to complete all ex without increasing pain, only muscle fatigue. [] No change. [x] Patient would continue to benefit from skilled physical therapy services in order to: address the following goals    STG: (to be met in 12 treatments)  1. ? Pain: Stabilize LB/LE pain and ensure optimal management of pain during all ADLs ( home, occupation, community and recreation)  2. ? ROM: Optimize Lumbar spine ROM for functional activity   3. ? Strength: R LE grossly 5/5 and demo good postural and core stability  4. ? Function:Pt to report improved sleep and ease w/ ADL's  5. Patient to be independent with home exercise program as demonstrated by performance with correct form without cues. LTG: (to be met in 18 treatments)  1. Able to complete adv ADL's and return to prev activity level w/o diffiuclty  2. Decr Oswestry score by 15% for improved overall function     Patient goals: ability to move around and sleep w/o pain    Pt. Education:  [x] Yes  [] No  [] Reviewed Prior HEP/Ed  Method of Education: [x] Verbal  [x] Demo  [] Written  Comprehension of Education:  [x] Verbalizes understanding. [x] Demonstrates understanding. [] Needs review. [x] Demonstrates/verbalizes HEP/Ed previously given.      Plan: [x] Continue current frequency toward long and short term goals.     [x] Specific Instructions for subsequent treatments: Cont with POC at 1x per week      Time In: 11:00 am       Time Out: 11:52 am    Electronically signed by:  Alesia Cowden, PTA

## 2021-06-21 NOTE — PROGRESS NOTES
HPI:     Back Pain  This is a chronic problem. The current episode started more than 1 year ago. The problem occurs intermittently. The problem has been gradually improving since onset. The pain is present in the gluteal and lumbar spine. The quality of the pain is described as aching. The pain radiates to the right foot, right knee and right thigh. The pain is moderate. The pain is worse during the night. The symptoms are aggravated by coughing, bending, lying down, position, standing, sitting, stress and twisting. Stiffness is present at night. Associated symptoms include leg pain. Risk factors include history of cancer. She has tried heat, ice, chiropractic manipulation, NSAIDs, home exercises, bed rest, muscle relaxant and walking (physical therapy) for the symptoms. The treatment provided moderate relief. Pain in the low back down the right leg. Epidural steroid injection with about 50% improvement. Continues to have some lingering pain. Physical therapy in the past of benefit. MRI with L4-5 degenerative changes and foraminal narrowing. She has seen a surgeon but wishes to exhaust other treatment modalities. Patient denies any new neurological symptoms. Nobowel or bladder incontinence, no weakness, and no falling. Review of OARRS does not show any aberrant prescription behavior.      Past Medical History:   Diagnosis Date    Asthma     Cancer [facility])     Thyroid    Controlled type 2 diabetes mellitus without complication, without long-term current use of insulin (HCC)     Thyroid cancer [facility])        Past Surgical History:   Procedure Laterality Date     SECTION      NERVE BLOCK Right 2021    : LUMBAR TRANSFORAMINAL L4-5 (Right )    PAIN MANAGEMENT PROCEDURE Right 2021    LUMBAR TRANSFORAMINAL L4-5 performed by Kishor Carr MD at [address] [place] [address]  2021    LUMBAR TRANSFORAMINAL L4/5 - Right    PAIN MANAGEMENT PROCEDURE Right 5/21/2021    LUMBAR TRANSFORAMINAL L4/5 performed by Arabella Horton MD at [address] [facility] Bilateral 2017       No Known Allergies      Current Outpatient Medications:     triamcinolone (ARISTOCORT) 0.5 % cream, APPLY A THIN LAYER TO THE AFFECTED AREA TWICE DAILY, Disp: , Rfl:     nystatin (MYCOSTATIN) 779379 UNIT/GM powder, APPLY TO THE AFFECTED AREA TWICE DAILY AS NEEDED, Disp: , Rfl:     levothyroxine (SYNTHROID) 112 MCG tablet, TAKE 1 TABLET BY MOUTH EVERY DAY, Disp: , Rfl:     gabapentin (NEURONTIN) 300 MG capsule, , Disp: , Rfl:     FEROSUL 325 (65 Fe) MG tablet, TAKE 1 TABLET BY MOUTH TWO TIMES A DAY, Disp: , Rfl:     ISIBLOOM 0.15-30 MG-MCG per tablet, TAKE 1 TABLET BY MOUTH EVERY DAY CONTINUOUSLY, Disp: , Rfl:     D3-50 1.25 MG (88403 UT) CAPS, , Disp: , Rfl:     ibuprofen (ADVIL;MOTRIN) 800 MG tablet, TAKE 1 TABLET BY MOUTH EVERY EIGHT HOURS AS NEEDED FOR PAIN, Disp: 90 tablet, Rfl: 0    Levothyroxine Sodium 112 MCG CAPS, Take 112 mcg by mouth daily, Disp: , Rfl:     omeprazole (PRILOSEC) 20 MG delayed release capsule, TAKE 1 CAPSULE BY MOUTH EVERY DAY, Disp: , Rfl:     glipiZIDE (GLUCOTROL) 10 MG tablet, , Disp: , Rfl:     alogliptin (NESINA) 25 MG TABS tablet, TAKE 1 TABLET BY MOUTH EVERY DAY, Disp: , Rfl:     metFORMIN (GLUCOPHAGE) 500 MG tablet, Take 500 mg by mouth 3 times daily, Disp: , Rfl:     liothyronine (CYTOMEL) 5 MCG tablet, Take 5 mcg by mouth 2 times daily, Disp: , Rfl:     Cetirizine HCl (ZYRTEC ALLERGY PO), Take by mouth, Disp: , Rfl:     B Complex-Folic Acid (T-129 BALANCED TR PO), Take 1 tablet by mouth 3 times daily (before meals), Disp: , Rfl:     vitamin D (CHOLECALCIFEROL) 5000 UNITS CAPS capsule, Take 5,000 Units by mouth daily, Disp: , Rfl:     Calcium-Magnesium-Vitamin D 495-663-280 MG-MG-UNIT TABS, Take 1 tablet by mouth 3 times daily (before meals), Disp: , Rfl:     pioglitazone (ACTOS) 30 MG tablet, TAKE 1 TABLET BY MOUTH IN THE MORNING (Patient not taking: Reported on 6/21/2021), Disp: , Rfl:     Norgestim-Eth Estrad Triphasic (TRI-PREVIFEM) 0.18/0.215/0.25 MG-35 MCG TABS, Take 1 tablet by mouth daily (Patient not taking: Reported on 6/21/2021), Disp: , Rfl:     Family History   Problem Relation Age of Onset    Other Mother         MTHFR Mutation    Diabetes Father         IDDM    Stroke Father     Heart Disease Father     Heart Attack Father     Arrhythmia Father     Arthritis Maternal Grandmother     Cancer Maternal Grandmother     Heart Failure Maternal Grandmother     Neuropathy Maternal Grandmother     Parkinsonism Maternal Grandfather     Arthritis Maternal Grandfather     Cancer Maternal Grandfather     High Blood Pressure Paternal Grandmother     Prostate Cancer Paternal Grandfather     Cancer Paternal Grandfather        Social History     Socioeconomic History    Marital status: Single     Spouse name: Not on file    Number of children: Not on file    Years of education: Not on file    Highest education level: Not on file   Occupational History    Not on file   Tobacco Use    Smoking status: Never Smoker    Smokeless tobacco: Never Used   Vaping Use    Vaping Use: Never used   Substance and Sexual Activity    Alcohol use: No    Drug use: No    Sexual activity: Yes     Partners: Male   Other Topics Concern    Not on file   Social History Narrative    ** Merged History Encounter **          Social Determinants of Health     Financial Resource Strain:     Difficulty of Paying Living Expenses:    Food Insecurity:     Worried About Running Out of Food in the Last Year:     Ran Out of Food in the Last Year:    Transportation Needs:     Lack of Transportation (Medical):      Lack of Transportation (Non-Medical):    Physical Activity:     Days of Exercise per Week:     Minutes of Exercise per Session:    Stress:     Feeling of Stress :    Social Connections:     Frequency of Communication with Friends go ahead and set up a repeat epidural steroid injection and a right L4 and L5 transforaminal approach. Discussed the importance of continued physical therapy and exercise program as well. She has seen a surgeon but wishes to exhaust other options first.      Vi Colmenares M.D. I have reviewed the chief complaint and history of present illness (including ROS and PFSH) and vital documentation by my staff and I agree with their documentation and have added where applicable.

## 2021-06-22 ENCOUNTER — OFFICE VISIT (OUTPATIENT)
Dept: ORTHOPEDIC SURGERY | Age: 41
End: 2021-06-22
Payer: COMMERCIAL

## 2021-06-22 VITALS — RESPIRATION RATE: 12 BRPM | BODY MASS INDEX: 25.66 KG/M2 | HEIGHT: 65 IN | WEIGHT: 154 LBS

## 2021-06-22 DIAGNOSIS — G89.29 CHRONIC LOW BACK PAIN, UNSPECIFIED BACK PAIN LATERALITY, UNSPECIFIED WHETHER SCIATICA PRESENT: Primary | ICD-10-CM

## 2021-06-22 DIAGNOSIS — M54.50 CHRONIC LOW BACK PAIN, UNSPECIFIED BACK PAIN LATERALITY, UNSPECIFIED WHETHER SCIATICA PRESENT: Primary | ICD-10-CM

## 2021-06-22 DIAGNOSIS — M51.36 DDD (DEGENERATIVE DISC DISEASE), LUMBAR: ICD-10-CM

## 2021-06-22 DIAGNOSIS — M54.10 RADICULAR LEG PAIN: ICD-10-CM

## 2021-06-22 DIAGNOSIS — M54.16 LUMBAR RADICULOPATHY: ICD-10-CM

## 2021-06-22 PROCEDURE — G8419 CALC BMI OUT NRM PARAM NOF/U: HCPCS | Performed by: ORTHOPAEDIC SURGERY

## 2021-06-22 PROCEDURE — 1036F TOBACCO NON-USER: CPT | Performed by: ORTHOPAEDIC SURGERY

## 2021-06-22 PROCEDURE — 99213 OFFICE O/P EST LOW 20 MIN: CPT | Performed by: ORTHOPAEDIC SURGERY

## 2021-06-22 PROCEDURE — G8427 DOCREV CUR MEDS BY ELIG CLIN: HCPCS | Performed by: ORTHOPAEDIC SURGERY

## 2021-06-22 NOTE — PROGRESS NOTES
Attends Club or Organization Meetings:     Marital Status:    Intimate Partner Violence:     Fear of Current or Ex-Partner:     Emotionally Abused:     Physically Abused:     Sexually Abused:      Past Medical History:   Diagnosis Date    Asthma     [facility] [address]     Thyroid    Controlled type 2 diabetes mellitus without complication, without long-term current use of insulin ([facility] [address] [facility]     Thyroid cancer ([facility] [address]      Past Surgical History:   Procedure Laterality Date     SECTION      NERVE BLOCK Right 2021    : LUMBAR TRANSFORAMINAL L4-5 (Right )    PAIN MANAGEMENT PROCEDURE Right 2021    LUMBAR TRANSFORAMINAL L4-5 performed by Tyree Montana MD at [address] [place] [address]  2021    LUMBAR TRANSFORAMINAL L4/5 - Right    PAIN MANAGEMENT PROCEDURE Right 2021    LUMBAR TRANSFORAMINAL L4/5 performed by Tyree Montana MD at [address] [facility] Bilateral 2017     Family History   Problem Relation Age of Onset    Other Mother         MTHFR Mutation    Diabetes Father         IDDM    Stroke Father     Heart Disease Father     Heart Attack Father     Arrhythmia Father     Arthritis Maternal Grandmother     Cancer Maternal Grandmother     Heart Failure Maternal Grandmother     Neuropathy Maternal Grandmother     Parkinsonism Maternal Grandfather     Arthritis Maternal Grandfather     Cancer Maternal Grandfather     High Blood Pressure Paternal Grandmother     Prostate Cancer Paternal Grandfather     Cancer Paternal Grandfather         Physical Exam:  Vitals signs and nursing note reviewed. Constitutional:       Appearance: She is well-developed. HENT:      Head: Normocephalic and atraumatic. Nose: Nose normal.   Eyes:      Conjunctiva/sclera: Conjunctivae normal.   Neck:      Musculoskeletal: Normal range of motion and neck supple. Pulmonary:      Effort: Pulmonary effort is normal. No respiratory distress. Musculoskeletal:      Comments: Normal gait     Skin:     General: Skin is warm and dry. Neurological:      Mental Status: She is alert and oriented to person, place, and time. Sensory: No sensory deficit. Psychiatric:         Behavior: Behavior normal.         Thought Content: Thought content normal.  Patient continues to have predominately L5 nerve root symptoms      Diagnostic imaging:    MRI is again reviewed patient with right L4-5 foraminal stenosis significant L4-5 disc space collapse with an asymmetric disc bulge into the foraminal region on the right  Assessment and Plan:  1. Chronic low back pain, unspecified back pain laterality, unspecified whether sciatica present    2. DDD (degenerative disc disease), lumbar    3. Radicular leg pain      Patient currently tolerable with significant but incomplete improvement    Follow up in 3 months    Provider Attestation:  Emily Swanson, personally performed the services described in this documentation. All medical record entries made by the scribe were at my direction and in my presence. I have reviewed the chart and discharge instructions and agree that the records reflect my personal performance and is accurate and complete. Manohar Vasquez MD 6/22/21     Scribe Attestation:  By signing my name below, Eddy Boyer, attest that this documentation has been prepared under the direction and in the presence of Dr. Yang Morel. Electronically signed: Julieth Riggs, 6/22/21     Please note that this chart was generated using voice recognition Dragon dictation software. Although every effort was made to ensure the accuracy of this automated transcription, some errors in transcription may have occurred.

## 2021-07-01 ENCOUNTER — HOSPITAL ENCOUNTER (OUTPATIENT)
Dept: PHYSICAL THERAPY | Facility: CLINIC | Age: 41
Setting detail: THERAPIES SERIES
Discharge: HOME | End: 2021-07-01
Payer: COMMERCIAL

## 2021-07-01 PROCEDURE — 97110 THERAPEUTIC EXERCISES: CPT

## 2021-07-01 PROCEDURE — G0283 ELEC STIM OTHER THAN WOUND: HCPCS

## 2021-07-01 NOTE — FLOWSHEET NOTE
[] [facility]) - [facility] &  Therapy  [address]  [phone]  F: [phone] [] [address]  [place] [address]   [address]  P: [phone]  F: [phone] [x] [address] &  Therapy  [address]  P: [phone]  F: [phone] [] [address]  P: [phone]  F: [phone] [] [address]  [facility]   [address]   [place]: [phone]  F: [phone]      Physical Therapy Daily Treatment Note    Date:  2021  Patient Name:  Kizzy Lazo    :  1980  MRN: [id number]  Physician: Shauna Lance MD                                 Insurance: [facility] 30 visits  Medical Diagnosis: Chronic low back pain, unspecified back pain laterality, unspecified whether sciatica present, Radicular leg pain, DDD  Rehab Codes: M54.5, G89.29 , M54.10 , M51.36  Onset Date: 21               Next 's appt.: prn  Visit# / total visits:      Cancels/No Shows: 0     Subjective:    Pain:  [x] Yes  [] No Location: LB, hip Pain Rating: (0-10 scale) 3/10  Pain altered Tx:  [x] No  [] Yes  Action:  Comments: Pt reports she had follow up with pain management who is going to give her another injection. States orthopedic doctor is in agreement with that plan. States sleeping is improved and only been having N/T 2x per week. Modalities: HP/ES to LB/SIJ in supine. Pelvic traction 60+ lbs (30-40 on/10 off) - not today  Precautions: thyroid Ca  Exercises: MET and shotgun maneuver prn.  MFR/TPr/DI to hip flexor, Gmed, piriformis prn  Exercise Reps/ Time Weight/ Level Comments    PRC ex 20x   x   MET 10\"x5 prn     LTR 10x10\"      HS stretch  3x30 sec   stool     DKTC 3x30 ea     x   piriformis 3x30 ea   Modified knee to opposite shoulder x   Hip flexor  3x30\" ea  EOB x   TrA w/ heel walk x10   x   Deadbugs x20   x   Bridges on SB 2x15   x   SB Leg Lifts x20   x   Clam shells 2x15 blue  x   SL Hip ABD 2x15 blue  x              scap retraction 15x 5-10 sec              Gym    x   4 way hip x15 B blue  x   tband sidestepping 2L blue  x   Paloff Press x15 blue SC  x     Other: Hypervolt to R glut med/piriformis, hip flexor release - not today    Treatment Charges: Mins Units   [x]  Modalities HP/ES 15 1   [x]  Ther Exercise 40 3   []  Manual Therapy     []  Ther Activities     []  Aquatics     []  Vasocompression     []  Other     Total Treatment time 55 4       Assessment: [x] Progressing toward goals. Presents out of alignment, corrected with MET. Cont with MHP/ES followed by stretches. Progressed strengthening with increased resistance across program as well as addition of TG squats. Instructed on doorway pec stretch and encouraged pt to be mindful of her posture throughout the day. [] No change. [x] Patient would continue to benefit from skilled physical therapy services in order to: address the following goals    STG: (to be met in 12 treatments)  1. ? Pain: Stabilize LB/LE pain and ensure optimal management of pain during all ADLs ( home, occupation, community and recreation)- progressing towards  2. ? ROM: Optimize Lumbar spine ROM for functional activity- MET   3. ? Strength: R LE grossly 5/5 and demo good postural and core stability- MET for LE strength , progressing core stability  4. ? Function:Pt to report improved sleep and ease w/ ADL's- MET  5. Patient to be independent with home exercise program as demonstrated by performance with correct form without cues. - Ongoing  LTG: (to be met in 18 treatments)  1. Able to complete adv ADL's and return to prev activity level w/o diffiuclty  2. Decr Oswestry score by 15% for improved overall function    Pt.  Education:  [x] Yes  [] No  [] Reviewed Prior HEP/Ed  Method of Education: [x] Verbal  [x] Demo [] Written  Comprehension of Education:  [x] Verbalizes understanding. [x] Demonstrates understanding. [] Needs review. [x] Demonstrates/verbalizes HEP/Ed previously given. Plan: [x] Continue current frequency toward long and short term goals.     [x] Specific Instructions for subsequent treatments: Cont with POC at 1x per week      Time In: 1:00 pm       Time Out: 2:00 pm    Electronically signed by:  Ernestina Dunn PTA

## 2021-07-03 DIAGNOSIS — M54.50 CHRONIC LOW BACK PAIN, UNSPECIFIED BACK PAIN LATERALITY, UNSPECIFIED WHETHER SCIATICA PRESENT: ICD-10-CM

## 2021-07-03 DIAGNOSIS — G89.29 CHRONIC LOW BACK PAIN, UNSPECIFIED BACK PAIN LATERALITY, UNSPECIFIED WHETHER SCIATICA PRESENT: ICD-10-CM

## 2021-07-08 ENCOUNTER — HOSPITAL ENCOUNTER (OUTPATIENT)
Dept: PHYSICAL THERAPY | Facility: CLINIC | Age: 41
Setting detail: THERAPIES SERIES
Discharge: HOME | End: 2021-07-08
Payer: COMMERCIAL

## 2021-07-08 PROCEDURE — G0283 ELEC STIM OTHER THAN WOUND: HCPCS

## 2021-07-08 PROCEDURE — 97110 THERAPEUTIC EXERCISES: CPT

## 2021-07-08 NOTE — FLOWSHEET NOTE
[] [address]  - [facility]  [facility]  [address]  [phone]  F: [phone] [] [address]  [place] [address]   [address]  P: [phone]  F: [phone] [x] [address] &  Therapy  [address]  P: [phone]  F: [phone] [] [address]  P: [phone]  F: [phone] [] [address]  [facility]   [address]   [place]: [phone]  F: [phone]      Physical Therapy Daily Treatment Note    Date:  2021  Patient Name:  Jaden Burk    :  1980  MRN: [id number]  Physician: Radha Alves MD                                 Insurance: [facility] 30 visits  Medical Diagnosis: Chronic low back pain, unspecified back pain laterality, unspecified whether sciatica present, Radicular leg pain, DDD  Rehab Codes: M54.5, G89.29 , M54.10 , M51.36  Onset Date: 21               Next 's appt.: prn  Visit# / total visits:      Cancels/No Shows: 0     Subjective:    Pain:  [x] Yes  [] No Location: LB, hip Pain Rating: (0-10 scale) 4/10  Pain altered Tx:  [x] No  [] Yes  Action:  Comments: Pt reports some increased pain today after riding in the car for 9 hours yesterday. Modalities: HP/ES to LB/SIJ in supine. Pelvic traction 60+ lbs (30-40 on/10 off) - not today  Precautions: thyroid Ca  Exercises: MET and shotgun maneuver prn.  MFR/TPr/DI to hip flexor, Gmed, piriformis prn  Exercise Reps/ Time Weight/ Level Comments    PRC ex 20x   x   MET 10\"x5 prn     LTR 10x10\"      HS stretch  3x30 sec   stool     DKTC 3x30 ea     x   piriformis 3x30 ea   Modified knee to opposite shoulder x   Hip flexor  3x30\" ea  EOB x   TrA w/ heel walk x10   x   Deadbugs x20   x   Bridges on SB 2x15   x   SB Leg Lifts x20   x   Clam shells 2x15 blue  x   SL Hip ABD 2x15 blue  x   Prone Hip Ext 2x10  Pillow under hips x   Quadruped Alt UE x20   x   Quadruped Alt LE x20     x   scap retraction 15x 5-10 sec              Gym       4 way hip x15 B blue     tband sidestepping 2L blue     Paloff Press x15 blue SC       Other: Hypervolt to R glut med/piriformis, hip flexor release - not today    Treatment Charges: Mins Units   [x]  Modalities HP/ES 15 1   [x]  Ther Exercise 35 2   []  Manual Therapy     []  Ther Activities     []  Aquatics     []  Vasocompression     []  Other     Total Treatment time 50 3       Assessment: [x] Progressing toward goals. Presents out of alignment, corrected with MET. Cont with MHP/ES followed by stretches. Progressed core stability with quadruped ex, fatigue noted but no pain. Cues to keep hips forward with sidelying hip abduction. [] No change. [x] Patient would continue to benefit from skilled physical therapy services in order to: address the following goals    STG: (to be met in 12 treatments)  1. ? Pain: Stabilize LB/LE pain and ensure optimal management of pain during all ADLs ( home, occupation, community and recreation)- progressing towards  2. ? ROM: Optimize Lumbar spine ROM for functional activity- MET   3. ? Strength: R LE grossly 5/5 and demo good postural and core stability- MET for LE strength , progressing core stability  4. ? Function:Pt to report improved sleep and ease w/ ADL's- MET  5. Patient to be independent with home exercise program as demonstrated by performance with correct form without cues. - Ongoing  LTG: (to be met in 18 treatments)  1. Able to complete adv ADL's and return to prev activity level w/o diffiuclty  2. Decr Oswestry score by 15% for improved overall function    Pt. Education:  [x] Yes  [] No  [] Reviewed Prior HEP/Ed  Method of Education: [x] Verbal  [x] Demo  [] Written  Comprehension of Education:  [x] Verbalizes understanding. [x] Demonstrates understanding.   [] Needs review. [x] Demonstrates/verbalizes HEP/Ed previously given. Plan: [x] Continue current frequency toward long and short term goals.     [x] Specific Instructions for subsequent treatments: Cont with POC at 1x per week      Time In: 1:00 pm       Time Out: 1:52 pm    Electronically signed by:  Rosetta Sandoval PTA

## 2021-07-12 RX ORDER — IBUPROFEN 800 MG/1
TABLET ORAL
Qty: 90 TABLET | Refills: 0 | Status: SHIPPED | OUTPATIENT
Start: 2021-07-12 | End: 2021-07-19

## 2021-07-14 NOTE — ED PROVIDER NOTES
Emergency Department         COMPLAINT       Chief Complaint   Patient presents with    Back Pain     x 1 week      PHYSICAL EXAM      ED Triage Vitals   BP Temp Temp Source Pulse Resp SpO2 Height Weight   12/18/20 1313 12/18/20 1311 12/18/20 1311 12/18/20 1311 12/18/20 1311 12/18/20 1311 12/18/20 1311 12/18/20 1311   103/75 98.6 °F (37 °C) Oral 90 16 99 % 5' 5\" (1.651 m) 142 lb (64.4 kg)         Constitutional: Alert, oriented x3, nontoxic, answering questions appropriately, acting properly for age, in no acute distress   HEENT: Extraocular muscles intact,  Neck: Trachea midline   Cardiovascular: Regular rhythm and rate no S3, S4, or murmurs   Respiratory: Clear to auscultation bilaterally no wheezes, rhonchi, rales, no respiratory distress no tachypnea no retractions no hypoxia  Gastrointestinal: Soft, nontender, nondistended, positive bowel sounds. No rebound, rigidity, or guarding. Musculoskeletal: No extremity pain or swelling no midline back tenderness to palpation  Neurologic: No cauda equina syndrome. Extensor hallucis longus +5/5 bilaterally. Patellar tendons +1/4 bilaterally. Straight leg raise elicited pain in the right side back down the leg with elevation to about 45 degrees. There was also pain when elevating the left leg on the right side. Skin: Warm and dry       Physical Exam  DIAGNOSTIC RESULTS     EKG: All EKG's are interpreted by the Emergency Department Physician who either signs or Co-signs this chart in the absence of a cardiologist.    Not indicated unless otherwise documented above or in the midlevel documentation    LABS:  No results found for this visit on 12/18/20. Not indicated unless otherwise documented above or in the midlevel documentation    RADIOLOGY:   I reviewedthe radiologist interpretations:  XR LUMBAR SPINE (2-3 VIEWS)   Final Result   1. No acute osseous abnormality of the lumbar spine. 2. Moderate L4-5 degenerative disc disease.              Not indicated unless otherwise documented above or in the midlevel documentation    EMERGENCY DEPARTMENT COURSE:       PERTINENT ATTENDING PHYSICIAN COMMENTS:    No signs of cauda equina syndrome. Back pain with radiation to her right leg with no history of significant back problems. No loss of bowel or bladder control. X-rays. Steroids. 2 PM x-ray shows degenerative changes. Will discharge with follow-up. No gross focal neurologic deficits no cauda equina syndrome. Will discharge on prednisone and Flexeril. May need an MRI if not improving. Return if worsening symptoms or other concerns. Faculty Attestation    I performed a history and physical examination of the patient and discussed management with the mid level provideer. I reviewed the mid level provider's note and agree with the documented findings and plan of care. Any areas of disagreement are noted on the chart. I was personally present for the key portions of any procedures. I have documented in the chart those procedures where I was not present during the key portions. I have reviewed the emergency nurses triage note. I agree with the chief complaint, past medical history, past surgical history, allergies, medications, social and family history as documented unless otherwise noted below. Documentation of the HPI, Physical Exam and Medical Decision Making performed by medical students or scribes is based on my personal performance of the HPI, PE and MDM. For Physician Assistant/ Nurse Practitioner cases/documentation I have personally evaluated this patient and have completed at least one if not all key elements of the E/M (history, physical exam, and MDM). Additional findings are as noted.        Papa Whitman, DO  12/20/20 [address] [employer]

## 2021-07-15 ENCOUNTER — HOSPITAL ENCOUNTER (OUTPATIENT)
Age: 41
Discharge: HOME | End: 2021-07-15
Payer: COMMERCIAL

## 2021-07-15 LAB
THYROXINE, FREE: 1.16 NG/DL (ref 0.93–1.7)
TSH SERPL DL<=0.05 MIU/L-ACNC: 6.05 MIU/L (ref 0.3–5)

## 2021-07-15 PROCEDURE — 84443 ASSAY THYROID STIM HORMONE: CPT

## 2021-07-15 PROCEDURE — 84439 ASSAY OF FREE THYROXINE: CPT

## 2021-07-15 PROCEDURE — 36415 COLL VENOUS BLD VENIPUNCTURE: CPT

## 2021-07-15 PROCEDURE — 84432 ASSAY OF THYROGLOBULIN: CPT

## 2021-07-15 PROCEDURE — 86800 THYROGLOBULIN ANTIBODY: CPT

## 2021-07-16 LAB
THYROGLOBULIN AB: 24 IU/ML (ref 0–40)
THYROGLOBULIN: <0.2 NG/ML (ref 0–63.4)

## 2021-07-19 ENCOUNTER — HOSPITAL ENCOUNTER (OUTPATIENT)
Dept: PHYSICAL THERAPY | Facility: CLINIC | Age: 41
Setting detail: THERAPIES SERIES
Discharge: HOME | End: 2021-07-19
Payer: COMMERCIAL

## 2021-07-19 DIAGNOSIS — M54.50 CHRONIC LOW BACK PAIN, UNSPECIFIED BACK PAIN LATERALITY, UNSPECIFIED WHETHER SCIATICA PRESENT: ICD-10-CM

## 2021-07-19 DIAGNOSIS — G89.29 CHRONIC LOW BACK PAIN, UNSPECIFIED BACK PAIN LATERALITY, UNSPECIFIED WHETHER SCIATICA PRESENT: ICD-10-CM

## 2021-07-19 PROCEDURE — 97110 THERAPEUTIC EXERCISES: CPT

## 2021-07-19 PROCEDURE — G0283 ELEC STIM OTHER THAN WOUND: HCPCS

## 2021-07-19 RX ORDER — IBUPROFEN 800 MG/1
TABLET ORAL
Qty: 90 TABLET | Refills: 0 | Status: SHIPPED | OUTPATIENT
Start: 2021-07-19 | End: 2021-07-29

## 2021-07-19 NOTE — FLOWSHEET NOTE
[] [facility]) - [facility] &  Therapy  [address]  [phone]  F: [phone] [] [address]  [place] [address]   [address]  P: [phone]  F: [phone] [x] [address] &  Therapy  [address]  P: [phone]  F: [phone] [] [address]  P: [phone]  F: [phone] [] [address]  [facility]   [address]   [place]: [phone]  F: [phone]      Physical Therapy Daily Treatment Note    Date:  2021  Patient Name:  Parviz Hart    :  1980  MRN: [id number]  Physician: Aleja Salinas MD                                 Insurance: [employer] 30 visits  Medical Diagnosis: Chronic low back pain, unspecified back pain laterality, unspecified whether sciatica present, Radicular leg pain, DDD  Rehab Codes: M54.5, G89.29 , M54.10 , M51.36  Onset Date: 21               Next 's appt.: prn  Visit# / total visits:      Cancels/No Shows: 0     Subjective:    Pain:  [x] Yes  [] No Location: LB, hip Pain Rating: (0-10 scale) 4/10  Pain altered Tx:  [x] No  [] Yes  Action:  Comments: Pt reports she had 2 injections, after 2nd has been better. Had f/u w/ Ortho and pn mgmt and to have 1 more inj this Fri. Sore today from driving from [place] yesterday, typically has been pretty good. Notes discomfort is always theres but more tolerable and usually 2-3/10. States incr activity jhon and waking up 1x a night vs several. Very conscious of positioning w/ activity and work duties. Denies any recent N&T. Notes pain is R side LB to R hip. Modalities: HP/ES to LB/SIJ in supine. Pelvic traction 60+ lbs (30-40 on/10 off) - not today  Precautions: thyroid Ca  Exercises: MET and shotgun maneuver prn.  MFR/TPr/DI to hip flexor, Gmed, piriformis prn  Exercise Reps/ Time Weight/ Level Comments    PRC ex 20x   x   MET 10\"x5 prn  x   LTR 10x10\"      HS stretch  3x30 sec   stool     DKTC 3x30 ea     x   piriformis 3x30 ea    x   Hip flexor  3x30\" ea  EOB x   TrA w/ heel walk x10   x   Deadbugs x20   x   Bridges on SB 2x15      SB Leg Lifts x20   x   Clam shells 2x15 blue  x   SL Hip ABD 2x15 blue  x   Prone Hip Ext 2x10  Pillow under hips x   Quadruped Alt UE x20   x   Quadruped Alt LE x20     x   Quadruped Alt U/LE x10   x   Cat/cow x10   x   meron pose 1m  Add 3 way x          scap retraction 15x 5-10 sec              Gym       4 way hip x15 B blue     tband sidestepping 2L blue     Paloff Press x15 blue SC       Other: Hypervolt to R glut med/piriformis, hip flexor release - not today    Treatment Charges: Mins Units   [x]  Modalities HP/ES 15 1   [x]  Ther Exercise 40 3   [x]  Manual Therapy 5 -   []  Ther Activities     []  Aquatics     []  Vasocompression     []  Other     Total Treatment time 50 3       Assessment: [x] Progressing toward goals. Presents w/ slight SIJ dysfunction, mod TTP R>L SIJ and min muscular tension noted lumbar paraspinals, R Gmed and piriformis, corrected with MET followed by MHP/ES. Completed therex and stretches as noted above. Progressed core stability with quadruped alt U/LE and added cat cow and child pose, fatigue noted but no pain. Min cues for ex technique and stabilization. Emphasized pertinent ex to complete daily as well as cont's posture/positional awareness. Pt to obtain HP for home to assist w/ symptoms. Will cont remaining visit and DC to indep HEP. [] No change.    [x] Patient would continue to benefit from skilled physical therapy services in order to: address the following goals    STG: (to be met in 12 treatments)  1. ? Pain: Stabilize LB/LE pain and ensure optimal management of pain during all ADLs ( home, occupation, community and recreation)- progressing towards  2. ? ROM: Optimize Lumbar spine ROM for functional activity- MET   3. ? Strength: R LE grossly 5/5 and demo good postural and core stability- MET for LE strength , progressing core stability  4. ? Function:Pt to report improved sleep and ease w/ ADL's- MET  5. Patient to be independent with home exercise program as demonstrated by performance with correct form without cues. - Ongoing  LTG: (to be met in 18 treatments)  1. Able to complete adv ADL's and return to prev activity level w/o diffiuclty  2. Decr Oswestry score by 15% for improved overall function    Pt. Education:  [x] Yes  [] No  [] Reviewed Prior HEP/Ed  Method of Education: [x] Verbal  [x] Demo  [] Written  Comprehension of Education:  [x] Verbalizes understanding. [x] Demonstrates understanding. [] Needs review. [x] Demonstrates/verbalizes HEP/Ed previously given. Plan: [x] Continue current frequency toward long and short term goals. [x] Specific Instructions for subsequent treatments: Cont with POC at 1x per week remaining visits then DC to indep HEP.        Time In: 2:30 pm       Time Out: 3:30 pm    Electronically signed by:  Savita Crawford, PT

## 2021-07-21 ENCOUNTER — ANESTHESIA EVENT (OUTPATIENT)
Dept: OPERATING ROOM | Age: 41
End: 2021-07-21
Payer: COMMERCIAL

## 2021-07-23 ENCOUNTER — APPOINTMENT (OUTPATIENT)
Dept: GENERAL RADIOLOGY | Age: 41
End: 2021-07-23
Attending: PAIN MEDICINE
Payer: COMMERCIAL

## 2021-07-23 ENCOUNTER — HOSPITAL ENCOUNTER (OUTPATIENT)
Age: 41
Setting detail: OUTPATIENT SURGERY
Discharge: HOME | End: 2021-07-23
Attending: PAIN MEDICINE | Admitting: PAIN MEDICINE
Payer: COMMERCIAL

## 2021-07-23 ENCOUNTER — ANESTHESIA (OUTPATIENT)
Dept: OPERATING ROOM | Age: 41
End: 2021-07-23
Payer: COMMERCIAL

## 2021-07-23 VITALS
DIASTOLIC BLOOD PRESSURE: 56 MMHG | BODY MASS INDEX: 25.66 KG/M2 | HEIGHT: 65 IN | TEMPERATURE: 97.4 F | WEIGHT: 154 LBS | RESPIRATION RATE: 16 BRPM | HEART RATE: 79 BPM | OXYGEN SATURATION: 99 % | SYSTOLIC BLOOD PRESSURE: 107 MMHG

## 2021-07-23 VITALS
DIASTOLIC BLOOD PRESSURE: 64 MMHG | RESPIRATION RATE: 21 BRPM | SYSTOLIC BLOOD PRESSURE: 117 MMHG | OXYGEN SATURATION: 100 %

## 2021-07-23 LAB
GLUCOSE BLD-MCNC: 89 MG/DL (ref 65–105)
HCG, PREGNANCY URINE (POC): NEGATIVE

## 2021-07-23 PROCEDURE — 2709999900 HC NON-CHARGEABLE SUPPLY: Performed by: PAIN MEDICINE

## 2021-07-23 PROCEDURE — 6360000002 HC RX W HCPCS: Performed by: NURSE ANESTHETIST, CERTIFIED REGISTERED

## 2021-07-23 PROCEDURE — 6360000004 HC RX CONTRAST MEDICATION: Performed by: PAIN MEDICINE

## 2021-07-23 PROCEDURE — 3700000000 HC ANESTHESIA ATTENDED CARE: Performed by: PAIN MEDICINE

## 2021-07-23 PROCEDURE — 64484 NJX AA&/STRD TFRM EPI L/S EA: CPT | Performed by: PAIN MEDICINE

## 2021-07-23 PROCEDURE — 7100000010 HC PHASE II RECOVERY - FIRST 15 MIN: Performed by: PAIN MEDICINE

## 2021-07-23 PROCEDURE — 3600000002 HC SURGERY LEVEL 2 BASE: Performed by: PAIN MEDICINE

## 2021-07-23 PROCEDURE — 3700000001 HC ADD 15 MINUTES (ANESTHESIA): Performed by: PAIN MEDICINE

## 2021-07-23 PROCEDURE — 3600000012 HC SURGERY LEVEL 2 ADDTL 15MIN: Performed by: PAIN MEDICINE

## 2021-07-23 PROCEDURE — 6360000002 HC RX W HCPCS: Performed by: PAIN MEDICINE

## 2021-07-23 PROCEDURE — 64483 NJX AA&/STRD TFRM EPI L/S 1: CPT | Performed by: PAIN MEDICINE

## 2021-07-23 PROCEDURE — 7100000011 HC PHASE II RECOVERY - ADDTL 15 MIN: Performed by: PAIN MEDICINE

## 2021-07-23 PROCEDURE — 81025 URINE PREGNANCY TEST: CPT

## 2021-07-23 PROCEDURE — 82947 ASSAY GLUCOSE BLOOD QUANT: CPT

## 2021-07-23 PROCEDURE — 3209999900 FLUORO FOR SURGICAL PROCEDURES

## 2021-07-23 RX ORDER — HYDRALAZINE HYDROCHLORIDE 20 MG/ML
5 INJECTION INTRAMUSCULAR; INTRAVENOUS EVERY 10 MIN PRN
Status: DISCONTINUED | OUTPATIENT
Start: 2021-07-23 | End: 2021-07-23 | Stop reason: HOSPADM

## 2021-07-23 RX ORDER — PROMETHAZINE HYDROCHLORIDE 25 MG/ML
6.25 INJECTION, SOLUTION INTRAMUSCULAR; INTRAVENOUS
Status: DISCONTINUED | OUTPATIENT
Start: 2021-07-23 | End: 2021-07-23 | Stop reason: HOSPADM

## 2021-07-23 RX ORDER — ONDANSETRON 2 MG/ML
4 INJECTION INTRAMUSCULAR; INTRAVENOUS
Status: DISCONTINUED | OUTPATIENT
Start: 2021-07-23 | End: 2021-07-23 | Stop reason: HOSPADM

## 2021-07-23 RX ORDER — MIDAZOLAM HYDROCHLORIDE 1 MG/ML
INJECTION INTRAMUSCULAR; INTRAVENOUS PRN
Status: DISCONTINUED | OUTPATIENT
Start: 2021-07-23 | End: 2021-07-23 | Stop reason: SDUPTHER

## 2021-07-23 RX ORDER — HYDROCODONE BITARTRATE AND ACETAMINOPHEN 5; 325 MG/1; MG/1
1 TABLET ORAL PRN
Status: DISCONTINUED | OUTPATIENT
Start: 2021-07-23 | End: 2021-07-23 | Stop reason: HOSPADM

## 2021-07-23 RX ORDER — MEPERIDINE HYDROCHLORIDE 50 MG/ML
12.5 INJECTION INTRAMUSCULAR; INTRAVENOUS; SUBCUTANEOUS EVERY 5 MIN PRN
Status: DISCONTINUED | OUTPATIENT
Start: 2021-07-23 | End: 2021-07-23 | Stop reason: HOSPADM

## 2021-07-23 RX ORDER — HYDROCODONE BITARTRATE AND ACETAMINOPHEN 5; 325 MG/1; MG/1
2 TABLET ORAL PRN
Status: DISCONTINUED | OUTPATIENT
Start: 2021-07-23 | End: 2021-07-23 | Stop reason: HOSPADM

## 2021-07-23 RX ORDER — DIPHENHYDRAMINE HYDROCHLORIDE 50 MG/ML
12.5 INJECTION INTRAMUSCULAR; INTRAVENOUS
Status: DISCONTINUED | OUTPATIENT
Start: 2021-07-23 | End: 2021-07-23 | Stop reason: HOSPADM

## 2021-07-23 RX ORDER — SODIUM CHLORIDE 0.9 % (FLUSH) 0.9 %
10 SYRINGE (ML) INJECTION PRN
Status: DISCONTINUED | OUTPATIENT
Start: 2021-07-23 | End: 2021-07-23 | Stop reason: HOSPADM

## 2021-07-23 RX ORDER — MORPHINE SULFATE 1 MG/ML
1 INJECTION, SOLUTION EPIDURAL; INTRATHECAL; INTRAVENOUS EVERY 5 MIN PRN
Status: DISCONTINUED | OUTPATIENT
Start: 2021-07-23 | End: 2021-07-23 | Stop reason: HOSPADM

## 2021-07-23 RX ORDER — DEXAMETHASONE SODIUM PHOSPHATE 10 MG/ML
INJECTION INTRAMUSCULAR; INTRAVENOUS PRN
Status: DISCONTINUED | OUTPATIENT
Start: 2021-07-23 | End: 2021-07-23 | Stop reason: ALTCHOICE

## 2021-07-23 RX ORDER — FENTANYL CITRATE 50 UG/ML
25 INJECTION, SOLUTION INTRAMUSCULAR; INTRAVENOUS EVERY 5 MIN PRN
Status: DISCONTINUED | OUTPATIENT
Start: 2021-07-23 | End: 2021-07-23 | Stop reason: HOSPADM

## 2021-07-23 RX ORDER — SODIUM CHLORIDE 0.9 % (FLUSH) 0.9 %
10 SYRINGE (ML) INJECTION EVERY 12 HOURS SCHEDULED
Status: DISCONTINUED | OUTPATIENT
Start: 2021-07-23 | End: 2021-07-23 | Stop reason: HOSPADM

## 2021-07-23 RX ORDER — FENTANYL CITRATE 50 UG/ML
INJECTION, SOLUTION INTRAMUSCULAR; INTRAVENOUS PRN
Status: DISCONTINUED | OUTPATIENT
Start: 2021-07-23 | End: 2021-07-23 | Stop reason: SDUPTHER

## 2021-07-23 RX ORDER — SODIUM CHLORIDE 9 MG/ML
25 INJECTION, SOLUTION INTRAVENOUS PRN
Status: DISCONTINUED | OUTPATIENT
Start: 2021-07-23 | End: 2021-07-23 | Stop reason: HOSPADM

## 2021-07-23 RX ADMIN — FENTANYL CITRATE 100 MCG: 50 INJECTION, SOLUTION INTRAMUSCULAR; INTRAVENOUS at 15:21

## 2021-07-23 RX ADMIN — MIDAZOLAM HYDROCHLORIDE 2 MG: 1 INJECTION, SOLUTION INTRAMUSCULAR; INTRAVENOUS at 15:21

## 2021-07-23 ASSESSMENT — PULMONARY FUNCTION TESTS
PIF_VALUE: 1
PIF_VALUE: 2
PIF_VALUE: 1

## 2021-07-23 ASSESSMENT — PAIN DESCRIPTION - DESCRIPTORS: DESCRIPTORS: BURNING;SHARP;SHOOTING

## 2021-07-23 ASSESSMENT — PAIN SCALES - GENERAL
PAINLEVEL_OUTOF10: 0

## 2021-07-23 ASSESSMENT — PAIN - FUNCTIONAL ASSESSMENT
PAIN_FUNCTIONAL_ASSESSMENT: PREVENTS OR INTERFERES SOME ACTIVE ACTIVITIES AND ADLS
PAIN_FUNCTIONAL_ASSESSMENT: 0-10

## 2021-07-23 NOTE — ANESTHESIA PRE PROCEDURE
Department of Anesthesiology  Preprocedure Note       Name:  Beti Huston   Age:  38 y.o.  :  1980                                          MRN:  [id number]         Date:  2021      Surgeon: Vinh Baer):  Christin Torres MD    Procedure: Procedure(s):  RIGHT L4/5 LUMBAR TRANSFORAMINAL    Medications prior to admission:   Prior to Admission medications    Medication Sig Start Date End Date Taking?  Authorizing Provider   ibuprofen (ADVIL;MOTRIN) 800 MG tablet TAKE 1 TABLET BY MOUTH EVERY EIGHT HOURS AS NEEDED FOR PAIN 21  Yes Genia Goodrich MD   nystatin (MYCOSTATIN) 481181 UNIT/GM powder APPLY TO THE AFFECTED AREA TWICE DAILY AS NEEDED 21  Yes Historical Provider, MD   levothyroxine (SYNTHROID) 112 MCG tablet TAKE 1 TABLET BY MOUTH EVERY DAY 21  Yes Historical Provider, MD   gabapentin (NEURONTIN) 300 MG capsule  21  Yes Historical Provider, MD   FEROSUL 325 (65 Fe) MG tablet TAKE 1 TABLET BY MOUTH TWO TIMES A DAY 21  Yes Historical Provider, MD   ISIBLOOM 0.15-30 MG-MCG per tablet TAKE 1 TABLET BY MOUTH EVERY DAY CONTINUOUSLY 21  Yes Historical Provider, MD   D3-50 1.25 MG (21848 UT) CAPS  6/15/21  Yes Historical Provider, MD   omeprazole (PRILOSEC) 20 MG delayed release capsule TAKE 1 CAPSULE BY MOUTH EVERY DAY 21  Yes Historical Provider, MD   glipiZIDE (GLUCOTROL) 10 MG tablet  21  Yes Historical Provider, MD   alogliptin (NESINA) 25 MG TABS tablet TAKE 1 TABLET BY MOUTH EVERY DAY 21  Yes Historical Provider, MD   metFORMIN (GLUCOPHAGE) 500 MG tablet Take 500 mg by mouth 3 times daily   Yes Historical Provider, MD   liothyronine (CYTOMEL) 5 MCG tablet Take 5 mcg by mouth 2 times daily   Yes Historical Provider, MD   Cetirizine HCl (ZYRTEC ALLERGY PO) Take by mouth   Yes Historical Provider, MD HADLEY Complex-Folic Acid (D-812 BALANCED TR PO) Take 1 tablet by mouth 3 times daily (before meals)   Yes Historical Provider, MD   vitamin D (CHOLECALCIFEROL) 5000 UNITS CAPS capsule Take 5,000 Units by mouth daily   Yes Historical Provider, MD   Calcium-Magnesium-Vitamin D 206348-551 MG-MG-UNIT TABS Take 1 tablet by mouth 3 times daily (before meals)   Yes Historical Provider, MD   triamcinolone (ARISTOCORT) 0.5 % cream APPLY A THIN LAYER TO THE AFFECTED AREA TWICE DAILY 4/23/21   Historical Provider, MD   pioglitazone (ACTOS) 30 MG tablet TAKE 1 TABLET BY MOUTH IN THE MORNING  Patient not taking: Reported on 6/21/2021 6/2/21   Historical Provider, MD   Levothyroxine Sodium 112 MCG CAPS Take 112 mcg by mouth daily    Historical Provider, MD   Norgestim-Eth Estrad Triphasic (TRI-PREVIFEM) 0.18/0.215/0.25 MG-35 MCG TABS Take 1 tablet by mouth daily  Patient not taking: Reported on 6/21/2021    Historical Provider, MD       Current medications:    Current Facility-Administered Medications   Medication Dose Route Frequency Provider Last Rate Last Admin    sodium chloride flush 0.9 % injection 10 mL  10 mL Intravenous 2 times per day Jf Shore MD        sodium chloride flush 0.9 % injection 10 mL  10 mL Intravenous PRN Jf Shore MD        0.9 % sodium chloride infusion  25 mL Intravenous PRN Jf Shore MD         Facility-Administered Medications Ordered in Other Encounters   Medication Dose Route Frequency Provider Last Rate Last Admin    thyrotropin dajuan (THYROGEN) injection 0.9 mg  0.9 mg Intramuscular Once Yuval Snider MD           Allergies:  No Known Allergies    Problem List:    Patient Active Problem List   Diagnosis Code    Asthma J45.909    Headache R51.9    Weight increase R63.5    Aches R52    Sleep difficulties G47.9    Tired R53.83    Mood change R45.86    Family history of MTHFR deficiency Z80.51    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    Vitamin D deficiency E55.9    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       Past Medical History:        Diagnosis Date    Asthma     Cancer ([facility] [address]     Thyroid    Controlled type 2 diabetes mellitus without complication, without long-term current use of insulin ([facility] [address]     Thyroid cancer [facility])        Past Surgical History:        Procedure Laterality Date     SECTION      NERVE BLOCK Right 2021    : LUMBAR TRANSFORAMINAL L4-5 (Right )    PAIN MANAGEMENT PROCEDURE Right 2021    LUMBAR TRANSFORAMINAL L4-5 performed by Francie Engel MD at [address] [place] [address]  2021    LUMBAR TRANSFORAMINAL L4/5 - Right    PAIN MANAGEMENT PROCEDURE Right 2021    LUMBAR TRANSFORAMINAL L4/5 [facility] [address] [facility]       Social History:    Social History     Tobacco Use    Smoking status: Never Smoker    Smokeless tobacco: Never Used   Substance Use Topics    Alcohol use: No                                Counseling given: Not Answered      Vital Signs (Current):   Vitals:    21 1411 21 1419   BP:  114/66   Pulse:  66   Resp:  20   Temp:  98.7 °F (37.1 °C)   TempSrc:  Temporal   SpO2:  100%   Weight: 154 lb (69.9 kg)    Height: 5' 5\" (1.651 m)                                               BP Readings from Last 3 Encounters:   21 114/66   21 104/66   21 116/63       NPO Status: Time of last liquid consumption: 600                        Time of last solid consumption: 600                        Date of last liquid consumption: 21                        Date of last solid food consumption: 21    BMI:   Wt Readings from Last 3 Encounters:   21 154 lb (69.9 kg)   21 154 lb (69.9 kg)   21 154 lb 3.2 oz (69.9 kg)     Body mass index is 25.63 kg/m².     CBC:   Lab Results   Component Value Date    WBC 6.0 2021    RBC 4.07 2021    RBC 4.77 2015    HGB 11.5 2021    HCT 37.2 2021    MCV 91.4 2021    RDW 14.9 2021     2021       CMP:   Lab Results   Component Value Date     2021    K 4.5 2021    CL 103 03/30/2021    CO2 26 03/30/2021    BUN 9 03/30/2021    CREATININE 0.51 03/30/2021    GFRAA >60 03/30/2021    LABGLOM >60 03/30/2021    GLUCOSE 157 03/30/2021    GLUCOSE 87 06/04/2015    PROT 6.6 03/30/2021    CALCIUM 9.1 03/30/2021    BILITOT 0.34 03/30/2021    BILITOT NEGATIVE 06/04/2015    ALKPHOS 45 03/30/2021    AST 16 03/30/2021    ALT 12 03/30/2021       POC Tests:   Recent Labs     07/23/21  1412   POCGLU 89       Coags: No results found for: PROTIME, INR, APTT    HCG (If Applicable):   Lab Results   Component Value Date    PREGTESTUR NEGATIVE 09/14/2020    HCG NEGATIVE 07/23/2021        ABGs: No results found for: PHART, PO2ART, KKU5QMD, MVF2UYH, BEART, O8CKQMHA     Type & Screen (If Applicable):  No results found for: LABABO, LABRH    Drug/Infectious Status (If Applicable):  No results found for: HIV, HEPCAB    COVID-19 Screening (If Applicable):   Lab Results   Component Value Date    COVID19 Not Detected 05/17/2021           Anesthesia Evaluation  Patient summary reviewed and Nursing notes reviewed no history of anesthetic complications:   Airway: Mallampati: II  TM distance: >3 FB   Neck ROM: full  Mouth opening: > = 3 FB Dental: normal exam         Pulmonary:normal exam  breath sounds clear to auscultation  (+) asthma:                            Cardiovascular:            Rhythm: regular  Rate: normal                    Neuro/Psych:   (+) headaches:,             GI/Hepatic/Renal:             Endo/Other:    (+) DiabetesType II DM, well controlled, , malignancy/cancer. Abdominal:       Abdomen: soft. Vascular: negative vascular ROS. Other Findings:             Anesthesia Plan      MAC     ASA 2             Anesthetic plan and risks discussed with patient. Plan discussed with CRNA.                   Jasper Ramirez MD   7/23/2021

## 2021-07-23 NOTE — ANESTHESIA POSTPROCEDURE EVALUATION
POST- ANESTHESIA EVALUATION       Pt Name: Harika Cueva  MRN: [id number]  YOB: 1980  Date of evaluation: 7/23/2021  Time:  4:07 PM      BP (!) 107/56   Pulse 79   Temp 97.4 °F (36.3 °C) (Temporal)   Resp 16   Ht 5' 5\" (1.651 m)   Wt 154 lb (69.9 kg)   SpO2 99%   BMI 25.63 kg/m²      Consciousness Level  Awake  Cardiopulmonary Status  Stable  Pain Adequately Treated YES  Nausea / Vomiting  NO  Adequate Hydration  YES  Anesthesia Related Complications NONE      Electronically signed by Aidee Mooney MD on 7/23/2021 at 4:07 PM       Department of Anesthesiology  Postprocedure Note    Patient: Harika Cueva  MRN: [id number]  YOB: 1980  Date of evaluation: 7/23/2021  Time:  4:07 PM     Procedure Summary     Date: 07/23/21 Room / Location: [address]    Anesthesia Start: 2586 Anesthesia Stop: 1536    Procedure: RIGHT L4/5 LUMBAR TRANSFORAMINAL (Right Back) Diagnosis:       (M54.16 / M54.17 LUMBAR RADICULOPATHY)      (M54.5,G89.29 CHRONIC BILATERAL LOW BACK PAIN, UNSPECIFIED WHETHER SCIATICA PRESENT)    Surgeons: Caitlin Padgett MD Responsible Provider: Aidee Mooney MD    Anesthesia Type: MAC ASA Status: 2          Anesthesia Type: MAC    Mary Kate Phase I: Mary Kate Score: 10    Mary Kate Phase II: Mary Kate Score: 10    Last vitals: Reviewed and per EMR flowsheets.        Anesthesia Post Evaluation

## 2021-07-23 NOTE — OP NOTE
Transforaminal Epidural Steroid Injection:  SURGEON: Delfina Kowalski MD    PRE-OP DIAGNOSIS: M54.17 (lumbosacral neuritis), M54.5 (low back pain)    POST-OP DIAGNOSIS: Same. PROCEDURE PERFORMED:  Right L4 and L5 Lumbar Transforaminal FAZAL selective nerve root block. Physician confirmed and marked the surgical site. EBL: minimal    CONSENT: Patient has undergone the educational process with this procedure, is aware and fully understands the risks involved: potential damage to any and all body organs including possible bleeding, infection, and nerve injury, allergic reaction and headache. Patient also understands that the procedure will be undertaken in a safe, controlled and monitored setting. Patient recognizes that the benefits may include relief from pain and reduction in the oral use of medications. Patient agreed to proceed. The patient was counseled at length about the risks of grazyna Covid-19 during their perioperative period and any recovery window from their procedure. The patient was made aware that grazyna Covid-19  may worsen their prognosis for recovering from their procedure  and lend to a higher morbidity and/or mortality risk. All material risks, benefits, and reasonable alternatives including postponing the procedure were discussed. The patient does wish to proceed with the procedure at this time. PREP: The patient was placed in the prone position and padded appropriately. The injection site was prepped with chloroprep and draped appropriately. 5ml of 0.5% lidocaine was used to anesthetize the skin and subcutaneous tissue. PROCEDURE NOTE: A 22 gauge 3.5 inch Pencil-Point needle was then advanced to the Right L4 and L5 neuroforamen using a wide paramedian approach under fluoroscopic guidance. Aspiration was negative for blood, CSF and producing pain.  Contrast Medium: 1 ml of (omnipaque) contrast was then injected and the following was noted: appropriate spread of contrast along the nerve root sheath and adjacent epidural space 4mg Dexamethasone mixed with 1.5 ml of 0.5 % lidocaine was then injected into the nerve root sheath of each of the indicated levels. The needle was withdrawn by the physician and the nurse applied a sterile dressing. The patient tolerated the procedure well. No complications occured. Patient transferred to the recovery room in satisfatory condition. Appropriate written discharge instructions given to the patient.     Isabel Arciniega MD

## 2021-07-28 DIAGNOSIS — M54.50 CHRONIC LOW BACK PAIN, UNSPECIFIED BACK PAIN LATERALITY, UNSPECIFIED WHETHER SCIATICA PRESENT: ICD-10-CM

## 2021-07-28 DIAGNOSIS — G89.29 CHRONIC LOW BACK PAIN, UNSPECIFIED BACK PAIN LATERALITY, UNSPECIFIED WHETHER SCIATICA PRESENT: ICD-10-CM

## 2021-07-29 ENCOUNTER — HOSPITAL ENCOUNTER (OUTPATIENT)
Dept: PHYSICAL THERAPY | Facility: CLINIC | Age: 41
Setting detail: THERAPIES SERIES
Discharge: HOME | End: 2021-07-29
Payer: COMMERCIAL

## 2021-07-29 RX ORDER — IBUPROFEN 800 MG/1
TABLET ORAL
Qty: 90 TABLET | Refills: 0 | Status: SHIPPED | OUTPATIENT
Start: 2021-07-29 | End: 2021-10-01

## 2021-07-29 NOTE — FLOWSHEET NOTE
[] [facility]) [facility]  Therapy  [address]    [phone]  F: [phone]   [] [address]  [facility]   [address]  P: [phone]  F: [phone]  [] [address]  [address]  P: [phone]  F: [phone] [] [address]  P: [phone]  F: [phone]  [] [address]  [address] [address] [facility] [address]   [address]   [place]: [phone]  F: [phone]   [] [facility]  [address] [facility] [address]  [place]: [phone]   F: [phone]     Physical Therapy Cancel/No Show note    Date: 2021  Patient: Elliott Crandall  : 1980  MRN: [id number]    Cancels/No Shows to date:     For today's appointment patient:    [x]  Cancelled    [] Rescheduled appointment    [] No-show     Reason given by patient:    []  Patient ill    []  Conflicting appointment    [] No transportation      [] Conflict with work    [] No reason given    [] Weather related    [] COVID-19    [x] Other:      Comments: Family emergency      [] Next appointment was confirmed    Electronically signed by: Ramses Reynolds

## 2021-08-09 ENCOUNTER — OFFICE VISIT (OUTPATIENT)
Dept: PAIN MANAGEMENT | Age: 41
End: 2021-08-09
Payer: COMMERCIAL

## 2021-08-09 VITALS
BODY MASS INDEX: 26.52 KG/M2 | HEIGHT: 65 IN | DIASTOLIC BLOOD PRESSURE: 59 MMHG | WEIGHT: 159.2 LBS | SYSTOLIC BLOOD PRESSURE: 105 MMHG

## 2021-08-09 DIAGNOSIS — G89.29 OTHER CHRONIC PAIN: ICD-10-CM

## 2021-08-09 DIAGNOSIS — M47.817 LUMBOSACRAL SPONDYLOSIS WITHOUT MYELOPATHY: Primary | ICD-10-CM

## 2021-08-09 DIAGNOSIS — M54.16 LUMBAR RADICULOPATHY: ICD-10-CM

## 2021-08-09 PROCEDURE — 99213 OFFICE O/P EST LOW 20 MIN: CPT | Performed by: PAIN MEDICINE

## 2021-08-09 PROCEDURE — G8427 DOCREV CUR MEDS BY ELIG CLIN: HCPCS | Performed by: PAIN MEDICINE

## 2021-08-09 PROCEDURE — 1036F TOBACCO NON-USER: CPT | Performed by: PAIN MEDICINE

## 2021-08-09 PROCEDURE — G8419 CALC BMI OUT NRM PARAM NOF/U: HCPCS | Performed by: PAIN MEDICINE

## 2021-08-09 ASSESSMENT — ENCOUNTER SYMPTOMS
BOWEL INCONTINENCE: 0
BACK PAIN: 1

## 2021-08-09 NOTE — PROGRESS NOTES
HPI:     Back Pain  This is a chronic problem. The current episode started more than 1 year ago. The problem occurs intermittently. The problem has been gradually improving since onset. The pain is present in the lumbar spine. The quality of the pain is described as cramping. The pain does not radiate. Exacerbated by: patient states nothing. Associated symptoms include leg pain. Pertinent negatives include no bladder incontinence, bowel incontinence, chest pain, fever, headaches, numbness, tingling or weakness. Treatments tried: TF. The treatment provided significant relief. MRI with degenerative changes and disc bulging. She had epidural steroid injection x3 with almost complete resolution of her leg pain. Now continues to complain of lingering nonradiating aching low back pain. Physical therapy in the past with minimal benefit. Patient denies any new neurological symptoms. Nobowel or bladder incontinence, no weakness, and no falling. Review of OARRS does not show any aberrant prescription behavior.      Past Medical History:   Diagnosis Date    Asthma     Cancer [facility])     Thyroid    Controlled type 2 diabetes mellitus without complication, without long-term current use of insulin ([facility] [address]     Thyroid cancer [facility])        Past Surgical History:   Procedure Laterality Date     SECTION      NERVE BLOCK Right 2021    : LUMBAR TRANSFORAMINAL L4-5 (Right )    PAIN MANAGEMENT PROCEDURE Right 2021    LUMBAR TRANSFORAMINAL L4-5 performed by Chelsey Interiano MD at [address] [place] [address]  2021    LUMBAR TRANSFORAMINAL L4/5 - Right    PAIN MANAGEMENT PROCEDURE Right 2021    LUMBAR TRANSFORAMINAL L4/5 performed by Chelsey Interiano MD at [address] [place] [address] Right 2021    RIGHT L4/5 LUMBAR TRANSFORAMINAL - Right    PAIN MANAGEMENT PROCEDURE Right 2021    RIGHT L4/5 LUMBAR TRANSFORAMINAL performed by Marcia Falk MD Leon[place] at [address] [facility] Bilateral 2017       No Known Allergies      Current Outpatient Medications:     ibuprofen (ADVIL;MOTRIN) 800 MG tablet, TAKE 1 TABLET BY MOUTH EVERY EIGHT HOURS AS NEEDED FOR PAIN, Disp: 90 tablet, Rfl: 0    triamcinolone (ARISTOCORT) 0.5 % cream, APPLY A THIN LAYER TO THE AFFECTED AREA TWICE DAILY, Disp: , Rfl:     nystatin (MYCOSTATIN) 937923 UNIT/GM powder, APPLY TO THE AFFECTED AREA TWICE DAILY AS NEEDED, Disp: , Rfl:     levothyroxine (SYNTHROID) 112 MCG tablet, TAKE 1 TABLET BY MOUTH EVERY DAY, Disp: , Rfl:     gabapentin (NEURONTIN) 300 MG capsule, , Disp: , Rfl:     FEROSUL 325 (65 Fe) MG tablet, TAKE 1 TABLET BY MOUTH TWO TIMES A DAY, Disp: , Rfl:     ISIBLOOM 0.15-30 MG-MCG per tablet, TAKE 1 TABLET BY MOUTH EVERY DAY CONTINUOUSLY, Disp: , Rfl:     D3-50 1.25 MG (12835 UT) CAPS, , Disp: , Rfl:     Levothyroxine Sodium 112 MCG CAPS, Take 112 mcg by mouth daily, Disp: , Rfl:     omeprazole (PRILOSEC) 20 MG delayed release capsule, TAKE 1 CAPSULE BY MOUTH EVERY DAY, Disp: , Rfl:     glipiZIDE (GLUCOTROL) 10 MG tablet, , Disp: , Rfl:     alogliptin (NESINA) 25 MG TABS tablet, TAKE 1 TABLET BY MOUTH EVERY DAY, Disp: , Rfl:     metFORMIN (GLUCOPHAGE) 500 MG tablet, Take 500 mg by mouth 3 times daily, Disp: , Rfl:     liothyronine (CYTOMEL) 5 MCG tablet, Take 5 mcg by mouth 2 times daily, Disp: , Rfl:     Cetirizine HCl (ZYRTEC ALLERGY PO), Take by mouth, Disp: , Rfl:     B Complex-Folic Acid (H-844 BALANCED TR PO), Take 1 tablet by mouth 3 times daily (before meals), Disp: , Rfl:     vitamin D (CHOLECALCIFEROL) 5000 UNITS CAPS capsule, Take 5,000 Units by mouth daily, Disp: , Rfl:     Calcium-Magnesium-Vitamin D 279-174-199 MG-MG-UNIT TABS, Take 1 tablet by mouth 3 times daily (before meals), Disp: , Rfl:     pioglitazone (ACTOS) 30 MG tablet, TAKE 1 TABLET BY MOUTH IN THE MORNING (Patient not taking: Reported on 6/21/2021), Disp: , Rfl:   Norgestim-Eth Estrad Triphasic (TRI-PREVIFEM) 0.18/0.215/0.25 MG-35 MCG TABS, Take 1 tablet by mouth daily (Patient not taking: Reported on 6/21/2021), Disp: , Rfl:     Family History   Problem Relation Age of Onset    Other Mother         MTHFR Mutation    Diabetes Father         IDDM    Stroke Father     Heart Disease Father     Heart Attack Father     Arrhythmia Father     Arthritis Maternal Grandmother     Cancer Maternal Grandmother     Heart Failure Maternal Grandmother     Neuropathy Maternal Grandmother     Parkinsonism Maternal Grandfather     Arthritis Maternal Grandfather     Cancer Maternal Grandfather     High Blood Pressure Paternal Grandmother     Prostate Cancer Paternal Grandfather     Cancer Paternal Grandfather        Social History     Socioeconomic History    Marital status: Single     Spouse name: Not on file    Number of children: Not on file    Years of education: Not on file    Highest education level: Not on file   Occupational History    Not on file   Tobacco Use    Smoking status: Never Smoker    Smokeless tobacco: Never Used   Vaping Use    Vaping Use: Never used   Substance and Sexual Activity    Alcohol use: No    Drug use: No    Sexual activity: Yes     Partners: Male   Other Topics Concern    Not on file   Social History Narrative    ** Merged History Encounter **          Social Determinants of Health     Financial Resource Strain:     Difficulty of Paying Living Expenses:    Food Insecurity:     Worried About Running Out of Food in the Last Year:     Ran Out of Food in the Last Year:    Transportation Needs:     Lack of Transportation (Medical):      Lack of Transportation (Non-Medical):    Physical Activity:     Days of Exercise per Week:     Minutes of Exercise per Session:    Stress:     Feeling of Stress :    Social Connections:     Frequency of Communication with Friends and Family:     Frequency of Social Gatherings with Friends and Family:     Attends Denominational Services:     Active Member of Clubs or Organizations:     Attends Club or Organization Meetings:     Marital Status:    Intimate Partner Violence:     Fear of Current or Ex-Partner:     Emotionally Abused:     Physically Abused:     Sexually Abused:        Review of Systems:  Review of Systems   Constitutional: Negative for fever. Cardiovascular: Negative for chest pain. Musculoskeletal: Positive for back pain. Gastrointestinal: Negative for bowel incontinence. Genitourinary: Negative for bladder incontinence. Neurological: Negative for headaches, numbness, tingling and weakness. Physical Exam:  BP (!) 105/59   Ht 5' 5\" (1.651 m)   Wt 159 lb 3.2 oz (72.2 kg)   BMI 26.49 kg/m²     Physical Exam  Constitutional:       Appearance: Normal appearance. Pulmonary:      Effort: Pulmonary effort is normal.   Neurological:      Mental Status: She is alert. Psychiatric:         Attention and Perception: Attention and perception normal.         Mood and Affect: Mood and affect normal.     Tender over the lower lumbar facets      Record/Diagnostics Review:    As above, I did review the imaging    Orders:  Orders Placed This Encounter   Procedures    MN INJ DX/THER AGNT PARAVERT FACET JOINT, LUMBAR/SAC, 2ND LEVEL    MN INJ DX/THER AGNT PARAVERT FACET JOINT, LUMBAR/SAC, 1ST LEVEL       Assessment:  1. Lumbosacral spondylosis without myelopathy    2. Lumbar radiculopathy    3. Other chronic pain        Treatment Plan:  DISCUSSION: Treatment options discussed with patient and all questions answered to patient's satisfaction. OARRS Review: Reviewed and acceptable for medications prescribed. TREATMENT OPTIONS:     Discussed different treatment options including continued conservative care such as physical therapy, chiropractic care, acupuncture. Discussed different interventional options such as epidural steroids or medial branch blocks.   Also discussed neuromodulation in the form of spinal cord stimulation. Also discussed surgical evaluation. Wishes to proceed with injections  Has failed conservative options, significant axial low back pain with degenerative facet changes on MRI, good candidate for diagnostic lumbar facets at bilateral L4-5 and L5-S1 to see if pain is facet mediated, if this is beneficial would be a candidate for radiofrequency ablation. Epidurals helped radicular symptoms, now complaining of mainly back pain. Repeat epidurals in the future as needed. Vick Bolden M.D. I have reviewed the chief complaint and history of present illness (including ROS and PFSH) and vital documentation by my staff and I agree with their documentation and have added where applicable.

## 2021-08-10 ENCOUNTER — HOSPITAL ENCOUNTER (EMERGENCY)
Age: 41
Discharge: HOME | End: 2021-08-10
Attending: EMERGENCY MEDICINE
Payer: COMMERCIAL

## 2021-08-10 VITALS
DIASTOLIC BLOOD PRESSURE: 75 MMHG | RESPIRATION RATE: 12 BRPM | OXYGEN SATURATION: 97 % | TEMPERATURE: 98.6 F | SYSTOLIC BLOOD PRESSURE: 130 MMHG | HEART RATE: 84 BPM

## 2021-08-10 DIAGNOSIS — N39.0 URINARY TRACT INFECTION WITHOUT HEMATURIA, SITE UNSPECIFIED: Primary | ICD-10-CM

## 2021-08-10 LAB
-: ABNORMAL
AMORPHOUS: ABNORMAL
BACTERIA: ABNORMAL
BILIRUBIN URINE: NEGATIVE
CASTS UA: ABNORMAL /LPF
COLOR: YELLOW
COMMENT UA: ABNORMAL
CRYSTALS, UA: ABNORMAL /HPF
EPITHELIAL CELLS UA: ABNORMAL /HPF (ref 0–5)
GLUCOSE URINE: ABNORMAL
HCG(URINE) PREGNANCY TEST: NEGATIVE
KETONES, URINE: NEGATIVE
LEUKOCYTE ESTERASE, URINE: ABNORMAL
MUCUS: ABNORMAL
NITRITE, URINE: POSITIVE
OTHER OBSERVATIONS UA: ABNORMAL
PH UA: 5.5 (ref 5–8)
PROTEIN UA: ABNORMAL
RBC UA: ABNORMAL /HPF (ref 0–2)
RENAL EPITHELIAL, UA: ABNORMAL /HPF
SPECIFIC GRAVITY UA: 1.02 (ref 1–1.03)
TRICHOMONAS: ABNORMAL
TURBIDITY: CLEAR
URINE HGB: ABNORMAL
UROBILINOGEN, URINE: NORMAL
WBC UA: ABNORMAL /HPF (ref 0–5)
YEAST: ABNORMAL

## 2021-08-10 PROCEDURE — 87088 URINE BACTERIA CULTURE: CPT

## 2021-08-10 PROCEDURE — 87186 SC STD MICRODIL/AGAR DIL: CPT

## 2021-08-10 PROCEDURE — 81025 URINE PREGNANCY TEST: CPT

## 2021-08-10 PROCEDURE — 99283 EMERGENCY DEPT VISIT LOW MDM: CPT

## 2021-08-10 PROCEDURE — 81001 URINALYSIS AUTO W/SCOPE: CPT

## 2021-08-10 PROCEDURE — 87086 URINE CULTURE/COLONY COUNT: CPT

## 2021-08-10 RX ORDER — PHENAZOPYRIDINE HYDROCHLORIDE 200 MG/1
200 TABLET, FILM COATED ORAL 3 TIMES DAILY PRN
Qty: 6 TABLET | Refills: 0 | Status: SHIPPED | OUTPATIENT
Start: 2021-08-10 | End: 2021-08-13

## 2021-08-10 RX ORDER — NITROFURANTOIN 25; 75 MG/1; MG/1
100 CAPSULE ORAL 2 TIMES DAILY
Qty: 10 CAPSULE | Refills: 0 | Status: SHIPPED | OUTPATIENT
Start: 2021-08-10 | End: 2021-08-15

## 2021-08-10 ASSESSMENT — ENCOUNTER SYMPTOMS
NAUSEA: 0
BACK PAIN: 0
VOMITING: 0

## 2021-08-10 ASSESSMENT — PAIN DESCRIPTION - LOCATION: LOCATION: ABDOMEN

## 2021-08-10 ASSESSMENT — PAIN DESCRIPTION - PAIN TYPE: TYPE: ACUTE PAIN

## 2021-08-10 ASSESSMENT — PAIN SCALES - GENERAL: PAINLEVEL_OUTOF10: 5

## 2021-08-10 ASSESSMENT — PAIN DESCRIPTION - DESCRIPTORS: DESCRIPTORS: PRESSURE

## 2021-08-10 ASSESSMENT — PAIN DESCRIPTION - ORIENTATION: ORIENTATION: LOWER

## 2021-08-10 NOTE — ED PROVIDER NOTES
[id number] [facility] ED  [address] [facility] [address]  Phone: [phone]  Fax: [phone]        Pt Name: Wandy Sam  MRN: [id number]  [place] 1980  Date of evaluation: 8/10/21      CHIEF COMPLAINT     Chief Complaint   Patient presents with    Dysuria     Burning, frequency for 10 days. HISTORY OF PRESENT ILLNESS  (Location/Symptom, Timing/Onset, Context/Setting, Quality, Duration, Modifying Factors, Severity.)    Any Morales is a 38 y.o. female who presents with dysuria frequency. The patient dates for the last week and a half she has had urinary frequency dysuria some burning with urination she started to develop some suprapubic area discomfort recently no fever no chills no other abdominal pain no back pain the patient states she has attempted some over-the-counter medications but without relief      REVIEW OF SYSTEMS    (2-9 systems for level 4, 10 or more for level 5)     Review of Systems   Constitutional: Negative for chills and fever. Gastrointestinal: Negative for nausea and vomiting. Loose stools and lower abdominal discomfort   Genitourinary: Positive for dysuria and frequency. Negative for hematuria. Musculoskeletal: Negative for back pain. PAST MEDICAL HISTORY    has a past medical history of Asthma, Cancer ([address] Controlled type 2 diabetes mellitus without complication, without long-term current use of insulin ([address] and Thyroid cancer ([address] SURGICAL HISTORY      has a past surgical history that includes  section; Total Thyroidectomy (Bilateral, 2017); Nerve Block (Right, 2021); Pain management procedure (Right, 2021); Pain management procedure (2021); Pain management procedure (Right, 2021); Pain management procedure (Right, 2021); and Pain management procedure (Right, 2021).     CURRENTMEDICATIONS       Previous Medications    ALOGLIPTIN (NESINA) 25 MG TABS TABLET    TAKE 1 TABLET BY MOUTH EVERY DAY    B COMPLEX-FOLIC ACID (H-981 BALANCED TR PO)    Take 1 tablet by mouth 3 times daily (before meals)    CALCIUM-MAGNESIUM-VITAMIN D 500-250-200 MG-MG-UNIT TABS    Take 1 tablet by mouth 3 times daily (before meals)    CETIRIZINE HCL (ZYRTEC ALLERGY PO)    Take by mouth    D3-50 1.25 MG (04451 UT) CAPS        FEROSUL 325 (65 FE) MG TABLET    TAKE 1 TABLET BY MOUTH TWO TIMES A DAY    GABAPENTIN (NEURONTIN) 300 MG CAPSULE        GLIPIZIDE (GLUCOTROL) 10 MG TABLET        IBUPROFEN (ADVIL;MOTRIN) 800 MG TABLET    TAKE 1 TABLET BY MOUTH EVERY EIGHT HOURS AS NEEDED FOR PAIN    ISIBLOOM 0.15-30 MG-MCG PER TABLET    TAKE 1 TABLET BY MOUTH EVERY DAY CONTINUOUSLY    LEVOTHYROXINE (SYNTHROID) 112 MCG TABLET    TAKE 1 TABLET BY MOUTH EVERY DAY    LEVOTHYROXINE SODIUM 112 MCG CAPS    Take 112 mcg by mouth daily    LIOTHYRONINE (CYTOMEL) 5 MCG TABLET    Take 5 mcg by mouth 2 times daily    METFORMIN (GLUCOPHAGE) 500 MG TABLET    Take 500 mg by mouth 3 times daily    NORGESTIM-ETH ESTRAD TRIPHASIC (TRI-PREVIFEM) 0.18/0.215/0.25 MG-35 MCG TABS    Take 1 tablet by mouth daily    NYSTATIN (MYCOSTATIN) 838996 UNIT/GM POWDER    APPLY TO THE AFFECTED AREA TWICE DAILY AS NEEDED    OMEPRAZOLE (PRILOSEC) 20 MG DELAYED RELEASE CAPSULE    TAKE 1 CAPSULE BY MOUTH EVERY DAY    PIOGLITAZONE (ACTOS) 30 MG TABLET    TAKE 1 TABLET BY MOUTH IN THE MORNING    TRIAMCINOLONE (ARISTOCORT) 0.5 % CREAM    APPLY A THIN LAYER TO THE AFFECTED AREA TWICE DAILY    VITAMIN D (CHOLECALCIFEROL) 5000 UNITS CAPS CAPSULE    Take 5,000 Units by mouth daily       ALLERGIES     has No Known Allergies. FAMILY HISTORY     She indicated that her mother is alive. She indicated that her father is alive. She indicated that her brother is alive. She indicated that her maternal grandmother is . She indicated that her maternal grandfather is . She indicated that her paternal grandmother is alive.  She indicated that her paternal grandfather is . family history includes Arrhythmia in her father; Arthritis in her maternal grandfather and maternal grandmother; Cancer in her maternal grandfather, maternal grandmother, and paternal grandfather; Diabetes in her father; Heart Attack in her father; Heart Disease in her father; Heart Failure in her maternal grandmother; High Blood Pressure in her paternal grandmother; Neuropathy in her maternal grandmother; Other in her mother; Parkinsonism in her maternal grandfather; Prostate Cancer in her paternal grandfather; Stroke in her father. SOCIAL HISTORY      reports that she has never smoked. She has never used smokeless tobacco. She reports that she does not drink alcohol and does not use drugs. PHYSICAL EXAM    (up to 7 for level 4, 8 or more for level 5)   INITIAL VITALS:  oral temperature is 98.6 °F (37 °C). Her blood pressure is 130/75 and her pulse is 84. Her respiration is 12 and oxygen saturation is 97%. Physical Exam  Vitals and nursing note reviewed. Constitutional:       Appearance: Normal appearance. HENT:      Head: Normocephalic. Eyes:      Conjunctiva/sclera: Conjunctivae normal.   Cardiovascular:      Rate and Rhythm: Normal rate and regular rhythm. Pulmonary:      Effort: Pulmonary effort is normal.      Breath sounds: Normal breath sounds. Abdominal:      General: Bowel sounds are normal. There is no distension. Palpations: Abdomen is soft. There is no mass. Tenderness: There is abdominal tenderness. There is no right CVA tenderness, left CVA tenderness, guarding or rebound. Comments: Mild tenderness in the suprapubic region only no McBurney's area tenderness no peritoneal findings   Musculoskeletal:         General: Normal range of motion. Cervical back: Normal range of motion and neck supple. Skin:     General: Skin is warm and dry. Findings: No rash. Neurological:      General: No focal deficit present. Mental Status: She is alert. DIFFERENTIAL DIAGNOSIS/ MDM:     I did offer to establish an IV check labs and give the patient IV fluids but she politely defers so we will check a UA and a urine pregnancy    DIAGNOSTIC RESULTS         LABS:  Results for orders placed or performed during the hospital encounter of 08/10/21   Pregnancy, Urine   Result Value Ref Range    HCG(Urine) Pregnancy Test NEGATIVE NEGATIVE   Urinalysis Reflex to Culture   Result Value Ref Range    Color, UA YELLOW YELLOW    Turbidity UA CLEAR CLEAR    Glucose, Ur 1+ (A) NEGATIVE    Bilirubin Urine NEGATIVE NEGATIVE    Ketones, Urine NEGATIVE NEGATIVE    Specific Gravity, UA 1.023 1.005 - 1.030    Urine Hgb TRACE (A) NEGATIVE    pH, UA 5.5 5.0 - 8.0    Protein, UA 1+ (A) NEGATIVE    Urobilinogen, Urine Normal Normal    Nitrite, Urine POSITIVE (A) NEGATIVE    Leukocyte Esterase, Urine TRACE (A) NEGATIVE    Urinalysis Comments NOT REPORTED    Microscopic Urinalysis   Result Value Ref Range    -          WBC, UA 10 TO 20 0 - 5 /HPF    RBC, UA 0 TO 2 0 - 2 /HPF    Casts UA NOT REPORTED /LPF    Crystals, UA NOT REPORTED None /HPF    Epithelial Cells UA 5 TO 10 0 - 5 /HPF    Renal Epithelial, UA NOT REPORTED 0 /HPF    Bacteria, UA MODERATE (A) None    Mucus, UA NOT REPORTED None    Trichomonas, UA NOT REPORTED None    Amorphous, UA NOT REPORTED None    Other Observations UA Culture ordered based on defined criteria. (A) NOT REQ. Yeast, UA NOT REPORTED None           EMERGENCY DEPARTMENT COURSE:   Vitals:    Vitals:    08/10/21 1821   BP: 130/75   Pulse: 84   Resp: 12   Temp: 98.6 °F (37 °C)   TempSrc: Oral   SpO2: 97%     -------------------------  BP: 130/75, Temp: 98.6 °F (37 °C), Pulse: 84, Resp: 12      RE-EVALUATION:  UA is consistent with a urinary tract infection I will go ahead and write prescriptions for Macrobid and Pyridium recommending that she encourage fluids cranberry juice  The patient presents with a Urinary Tract Infection.   Evaluation of the abdomen and back is benign. No guarding, peritoneal signs, sepsis, toxicity, significant tenderness, life threatening or serious etiology was noted. The patient is tolerating PO intake. The patient appears stable for discharge and has been instructed to return immediately if the symptoms worsen in any way, or in 1-2 days if not improved for re-evaluation. We also discussed returning to the Emergency Department immediately if new or worsening symptoms occur. We have discussed the symptoms which are most concerning (e.g., abdominal or back pain, fever, a feeling of passing out, light headed, dizziness, chest pain, shortness of breath, persistent nausea and/or vomiting, numbness or weakness to the arms or legs, coolness or color change of the arms or legs) that necessitate immediate return. The patient understands that at this time there is no evidence for a more malignant underlying process, but the patient also understands that early in the process of an illness or injury, an emergency department workup can be falsely reassuring. Routine discharge counseling was given, and the patient understands that worsening, changing or persistent symptoms should prompt an immediate call or follow up with their primary physician or return to the emergency department. The importance of appropriate follow up was also discussed. I have reviewed the disposition diagnosis with the patient and or their family/guardian. I have answered their questions and given discharge instructions. They voiced understanding of these instructions and did not have any further questions or complaints. PROCEDURES:  None    FINAL IMPRESSION      1.  Urinary tract infection without hematuria, site unspecified          DISPOSITION/PLAN   DISPOSITION        CONDITION ON DISPOSITION:   Stable    PATIENT REFERRED TO:    [facility] Calling [phone]  Call in 2 days        DISCHARGE MEDICATIONS:  New Prescriptions    NITROFURANTOIN, MACROCRYSTAL-MONOHYDRATE, (MACROBID) 100 MG CAPSULE    Take 1 capsule by mouth 2 times daily for 5 days    PHENAZOPYRIDINE (PYRIDIUM) 200 MG TABLET    Take 1 tablet by mouth 3 times daily as needed for Pain (bladder spasm/pain)       (Please note that portions of this note were completed with a voicerecognition program.  Efforts were made to edit the dictations but occasionally words are mis-transcribed.)    Sho Gee MD,, MD, F.A.C.E.P.   Attending Emergency Medicine Physician       Sho Gee MD  08/10/21 7298

## 2021-08-10 NOTE — ED NOTES
Patient to the ER with c/c of urgency, frequency and burning with voiding for last 10 days. Patient has a hx of UTI's in the past. She has used AZO, cranberry juice. Patient is a/o x 3, patent airway, speaking clearly in complete sentences. Patient denies any CP or dyspnea. Lungs are clear and equal bilaterally to auscultation, HT are S/R, A=R. Abdomen is soft, pressure feeling. No NVD in last 24 hours. Normal Bowel habits. Patient has strong PMS x 4. No peripheral edema is appreciated. Patient was ambulatory into the ER today without distress.       Navya Armenta RN  08/10/21 9825

## 2021-08-12 LAB
CULTURE: ABNORMAL
Lab: ABNORMAL
SPECIMEN DESCRIPTION: ABNORMAL

## 2021-08-19 ENCOUNTER — HOSPITAL ENCOUNTER (OUTPATIENT)
Age: 41
Discharge: HOME | End: 2021-08-19
Payer: COMMERCIAL

## 2021-08-19 LAB
T3 FREE: 2.79 PG/ML (ref 2.02–4.43)
THYROXINE, FREE: 1.21 NG/DL (ref 0.93–1.7)
TSH SERPL DL<=0.05 MIU/L-ACNC: 2.55 MIU/L (ref 0.3–5)

## 2021-08-19 PROCEDURE — 84481 FREE ASSAY (FT-3): CPT

## 2021-08-19 PROCEDURE — 84443 ASSAY THYROID STIM HORMONE: CPT

## 2021-08-19 PROCEDURE — 36415 COLL VENOUS BLD VENIPUNCTURE: CPT

## 2021-08-19 PROCEDURE — 84439 ASSAY OF FREE THYROXINE: CPT

## 2021-09-02 ENCOUNTER — HOSPITAL ENCOUNTER (OUTPATIENT)
Dept: PAIN MANAGEMENT | Facility: CLINIC | Age: 41
Discharge: HOME | End: 2021-09-02
Payer: COMMERCIAL

## 2021-09-02 VITALS
WEIGHT: 159 LBS | HEART RATE: 77 BPM | DIASTOLIC BLOOD PRESSURE: 77 MMHG | RESPIRATION RATE: 11 BRPM | BODY MASS INDEX: 26.49 KG/M2 | OXYGEN SATURATION: 98 % | SYSTOLIC BLOOD PRESSURE: 127 MMHG | TEMPERATURE: 98.8 F | HEIGHT: 65 IN

## 2021-09-02 DIAGNOSIS — R52 PAIN MANAGEMENT: ICD-10-CM

## 2021-09-02 PROCEDURE — 64494 INJ PARAVERT F JNT L/S 2 LEV: CPT | Performed by: PAIN MEDICINE

## 2021-09-02 PROCEDURE — 64493 INJ PARAVERT F JNT L/S 1 LEV: CPT

## 2021-09-02 PROCEDURE — 2580000003 HC RX 258: Performed by: PAIN MEDICINE

## 2021-09-02 PROCEDURE — 2500000003 HC RX 250 WO HCPCS: Performed by: PAIN MEDICINE

## 2021-09-02 PROCEDURE — 6360000002 HC RX W HCPCS: Performed by: PAIN MEDICINE

## 2021-09-02 PROCEDURE — 64495 INJ PARAVERT F JNT L/S 3 LEV: CPT

## 2021-09-02 PROCEDURE — 64494 INJ PARAVERT F JNT L/S 2 LEV: CPT

## 2021-09-02 PROCEDURE — 64493 INJ PARAVERT F JNT L/S 1 LEV: CPT | Performed by: PAIN MEDICINE

## 2021-09-02 RX ORDER — SODIUM CHLORIDE 0.9 % (FLUSH) 0.9 %
SYRINGE (ML) INJECTION
Status: COMPLETED | OUTPATIENT
Start: 2021-09-02 | End: 2021-09-02

## 2021-09-02 RX ORDER — LIDOCAINE HYDROCHLORIDE 10 MG/ML
INJECTION, SOLUTION EPIDURAL; INFILTRATION; INTRACAUDAL; PERINEURAL
Status: COMPLETED | OUTPATIENT
Start: 2021-09-02 | End: 2021-09-02

## 2021-09-02 RX ORDER — BUPIVACAINE HYDROCHLORIDE 2.5 MG/ML
INJECTION, SOLUTION EPIDURAL; INFILTRATION; INTRACAUDAL
Status: COMPLETED | OUTPATIENT
Start: 2021-09-02 | End: 2021-09-02

## 2021-09-02 RX ORDER — MIDAZOLAM HYDROCHLORIDE 2 MG/2ML
INJECTION, SOLUTION INTRAMUSCULAR; INTRAVENOUS
Status: COMPLETED | OUTPATIENT
Start: 2021-09-02 | End: 2021-09-02

## 2021-09-02 RX ORDER — FENTANYL CITRATE 50 UG/ML
INJECTION, SOLUTION INTRAMUSCULAR; INTRAVENOUS
Status: COMPLETED | OUTPATIENT
Start: 2021-09-02 | End: 2021-09-02

## 2021-09-02 RX ADMIN — MIDAZOLAM HYDROCHLORIDE 2 MG: 1 INJECTION, SOLUTION INTRAMUSCULAR; INTRAVENOUS at 12:58

## 2021-09-02 RX ADMIN — FENTANYL CITRATE 100 MCG: 50 INJECTION INTRAMUSCULAR; INTRAVENOUS at 13:01

## 2021-09-02 RX ADMIN — Medication 3 ML: at 12:59

## 2021-09-02 RX ADMIN — BUPIVACAINE HYDROCHLORIDE 6 ML: 2.5 INJECTION, SOLUTION EPIDURAL; INFILTRATION; INTRACAUDAL; PERINEURAL at 13:04

## 2021-09-02 RX ADMIN — Medication 3 ML: at 13:03

## 2021-09-02 RX ADMIN — LIDOCAINE HYDROCHLORIDE 3 ML: 10 INJECTION, SOLUTION EPIDURAL; INFILTRATION; INTRACAUDAL at 13:03

## 2021-09-02 ASSESSMENT — PAIN - FUNCTIONAL ASSESSMENT
PAIN_FUNCTIONAL_ASSESSMENT: 0-10
PAIN_FUNCTIONAL_ASSESSMENT: 0-10
PAIN_FUNCTIONAL_ASSESSMENT: ACTIVITIES ARE NOT PREVENTED

## 2021-09-02 ASSESSMENT — PAIN DESCRIPTION - DESCRIPTORS: DESCRIPTORS: ACHING;DULL

## 2021-09-02 NOTE — H&P
Pain Pre-Op H&P Note    Heladio Klein MD    HPI: Sneha Morales  presents with back pain.     Past Medical History:   Diagnosis Date    Asthma     Cancer [facility])     Thyroid    Controlled type 2 diabetes mellitus without complication, without long-term current use of insulin (HCC)     Thyroid cancer [facility])        Past Surgical History:   Procedure Laterality Date     SECTION      NERVE BLOCK Right 2021    : LUMBAR TRANSFORAMINAL L4-5 (Right )    PAIN MANAGEMENT PROCEDURE Right 2021    LUMBAR TRANSFORAMINAL L4-5 performed by Heladio Klein MD at [address] [place] [address]  2021    LUMBAR TRANSFORAMINAL L4/5 - Right    PAIN MANAGEMENT PROCEDURE Right 2021    LUMBAR TRANSFORAMINAL L4/5 performed by Heladio Klein MD at [address] [place] [address] Right 2021    RIGHT L4/5 LUMBAR TRANSFORAMINAL - Right    PAIN MANAGEMENT PROCEDURE Right 2021    RIGHT L4/5 LUMBAR TRANSFORAMINAL performed by Heladio Klein MD at [address] [place] [address] 2017       Family History   Problem Relation Age of Onset    Other Mother         MTHFR Mutation    Diabetes Father         IDDM    Stroke Father     Heart Disease Father     Heart Attack Father     Arrhythmia Father     Arthritis Maternal Grandmother     Cancer Maternal Grandmother     Heart Failure Maternal Grandmother     Neuropathy Maternal Grandmother     Parkinsonism Maternal Grandfather     Arthritis Maternal Grandfather     Cancer Maternal Grandfather     High Blood Pressure Paternal Grandmother     Prostate Cancer Paternal Grandfather     Cancer Paternal Grandfather        No Known Allergies      Current Outpatient Medications:     ibuprofen (ADVIL;MOTRIN) 800 MG tablet, TAKE 1 TABLET BY MOUTH EVERY EIGHT HOURS AS NEEDED FOR PAIN, Disp: 90 tablet, Rfl: 0    triamcinolone (ARISTOCORT) 0.5 % cream, APPLY A THIN LAYER TO THE AFFECTED AREA TWICE DAILY, Disp: , Rfl:     pioglitazone (ACTOS) 30 MG tablet, TAKE 1 TABLET BY MOUTH IN THE MORNING (Patient not taking: Reported on 6/21/2021), Disp: , Rfl:     nystatin (MYCOSTATIN) 857041 UNIT/GM powder, APPLY TO THE AFFECTED AREA TWICE DAILY AS NEEDED, Disp: , Rfl:     levothyroxine (SYNTHROID) 112 MCG tablet, TAKE 1 TABLET BY MOUTH EVERY DAY, Disp: , Rfl:     gabapentin (NEURONTIN) 300 MG capsule, , Disp: , Rfl:     FEROSUL 325 (65 Fe) MG tablet, TAKE 1 TABLET BY MOUTH TWO TIMES A DAY, Disp: , Rfl:     ISIBLOOM 0.15-30 MG-MCG per tablet, TAKE 1 TABLET BY MOUTH EVERY DAY CONTINUOUSLY, Disp: , Rfl:     D3-50 1.25 MG (23278 UT) CAPS, , Disp: , Rfl:     Levothyroxine Sodium 112 MCG CAPS, Take 112 mcg by mouth daily, Disp: , Rfl:     omeprazole (PRILOSEC) 20 MG delayed release capsule, TAKE 1 CAPSULE BY MOUTH EVERY DAY, Disp: , Rfl:     glipiZIDE (GLUCOTROL) 10 MG tablet, , Disp: , Rfl:     alogliptin (NESINA) 25 MG TABS tablet, TAKE 1 TABLET BY MOUTH EVERY DAY, Disp: , Rfl:     metFORMIN (GLUCOPHAGE) 500 MG tablet, Take 500 mg by mouth 3 times daily, Disp: , Rfl:     liothyronine (CYTOMEL) 5 MCG tablet, Take 5 mcg by mouth 2 times daily, Disp: , Rfl:     Cetirizine HCl (ZYRTEC ALLERGY PO), Take by mouth, Disp: , Rfl:     B Complex-Folic Acid (N-612 BALANCED TR PO), Take 1 tablet by mouth 3 times daily (before meals), Disp: , Rfl:     vitamin D (CHOLECALCIFEROL) 5000 UNITS CAPS capsule, Take 5,000 Units by mouth daily, Disp: , Rfl:     Calcium-Magnesium-Vitamin D 800-801-309 MG-MG-UNIT TABS, Take 1 tablet by mouth 3 times daily (before meals), Disp: , Rfl:     Norgestim-Eth Estrad Triphasic (TRI-PREVIFEM) 0.18/0.215/0.25 MG-35 MCG TABS, Take 1 tablet by mouth daily (Patient not taking: Reported on 6/21/2021), Disp: , Rfl:   No current facility-administered medications for this encounter.     Facility-Administered Medications Ordered in Other Encounters:     thyrotropin dajuan (THYROGEN) injection 0.9 mg, 0.9 mg, IntraMUSCular, Once, Rama Alfonso MD    Social History     Tobacco Use    Smoking status: Never Smoker    Smokeless tobacco: Never Used   Substance Use Topics    Alcohol use: No       Review of Systems:   Focused review of systems was performed, and negative as pertinent to diagnosis, except as stated in HPI. Physical Exam  Constitutional:       Appearance: Normal appearance. Pulmonary:      Effort: Pulmonary effort is normal.   Neurological:      Mental Status: He is alert. Psychiatric:         Attention and Perception: Attention and perception normal.         Mood and Affect: Mood and affect normal.         Patient's current physical status, medications, medical history, and HPI have been reviewed and updated as appropriate on this date: 09/02/21    Risk/Benefit(s): The risks, benefits, alternatives, and potential complications have been discussed with the patient/family and informed consent has been obtained for the procedure/sedation.     Diagnosis:   spondylosis      Plan: lumbar mbb        Chelsey Interiano MD

## 2021-09-02 NOTE — OP NOTE
Lumbar Facet Nerve Block Injection:  Surgeon: Christin Torres MD     PRE-OP DIAGNOSIS: M47.817 (lumbosacral spondylosis), M54.5 (low back pain)    POST-OP DIAGNOSIS: Same. PROCEDURE PERFORMED: Lumbar Facet Nerve Block Multiple Levels  Bilateral L4 - 5 and L5 - S1. Physician confirmed and marked the surgical site. EBL: minimal      CONSENT: Patient has undergone the educational process with this procedure, is aware and fully understands the risks involved: potential damage to any and all body organs including possible bleeding, infection and nerve injury, allergic reaction and headache. Patient also understands that the procedure will be undertaken in a safe, controlled, and monitored setting. Patient recognizes that the benefits include relief from pain and reduction in the oral use of medications. Patient agreed to proceed. The patient was counseled at length about the risks of grazyna Covid-19 during their perioperative period and any recovery window from their procedure. The patient was made aware that grazyna Covid-19  may worsen their prognosis for recovering from their procedure  and lend to a higher morbidity and/or mortality risk. All material risks, benefits, and reasonable alternatives including postponing the procedure were discussed. The patient does wish to proceed with the procedure at this time. PREP: Timeout was performed prior to starting the procedure. The patient's back was prepped with chloroprep and draped appropriately. 5ml of 0.5% lidocaine was used to anesthetize the skin and subcutaneous tissue. PROCEDURE NOTE: A 25 gauge 3.5 inch spinal needle was advanced under  fluoroscopic guidance to the appropriate anatomic location for the medial branches corresponding to the facets at the base of the appropriate superior articular process and/or sacral ala . Aspiration was negative for blood, CSF and producing pain.  1 ml of 0.25% marcaine was then injected at each site to block medial branch nerve innervating the  Bilateral L4 - 5 and L5 - S1 facet joints. Patient tolerated the procedure well, no complications occurred. At the end of the injection the physician withdrew the needle and the nurse applied a sterile bandage to the site. Patient transferred to the recovery room in satisfactory condition. Appropriate written discharge instructions were given to the patient. If good results are obtained, Patient would be a candidate for Radiofrequency Ablation.       Gio Soto MD

## 2021-09-03 ENCOUNTER — TELEPHONE (OUTPATIENT)
Dept: PAIN MANAGEMENT | Age: 41
End: 2021-09-03

## 2021-09-03 NOTE — TELEPHONE ENCOUNTER
Patient states pain was 5 out of 10 prior to the procedure with activity and 1 out of 10 after the procedure with 80% pain relief. Her activities included standing, bending, walking and lifting. She feels it was successful and would like to proceed with the next steps.

## 2021-09-17 ENCOUNTER — HOSPITAL ENCOUNTER (OUTPATIENT)
Dept: PAIN MANAGEMENT | Facility: CLINIC | Age: 41
Discharge: HOME | End: 2021-09-17
Payer: COMMERCIAL

## 2021-09-17 VITALS
OXYGEN SATURATION: 97 % | HEART RATE: 76 BPM | TEMPERATURE: 98.3 F | DIASTOLIC BLOOD PRESSURE: 64 MMHG | RESPIRATION RATE: 12 BRPM | SYSTOLIC BLOOD PRESSURE: 115 MMHG

## 2021-09-17 DIAGNOSIS — R52 PAIN MANAGEMENT: ICD-10-CM

## 2021-09-17 LAB
GLUCOSE BLD-MCNC: 126 MG/DL (ref 65–105)
HCG, PREGNANCY URINE (POC): NEGATIVE

## 2021-09-17 PROCEDURE — G0260 INJ FOR SACROILIAC JT ANESTH: HCPCS

## 2021-09-17 PROCEDURE — 64494 INJ PARAVERT F JNT L/S 2 LEV: CPT | Performed by: PAIN MEDICINE

## 2021-09-17 PROCEDURE — 64493 INJ PARAVERT F JNT L/S 1 LEV: CPT

## 2021-09-17 PROCEDURE — 64493 INJ PARAVERT F JNT L/S 1 LEV: CPT | Performed by: PAIN MEDICINE

## 2021-09-17 PROCEDURE — 82947 ASSAY GLUCOSE BLOOD QUANT: CPT

## 2021-09-17 PROCEDURE — 2580000003 HC RX 258: Performed by: PAIN MEDICINE

## 2021-09-17 PROCEDURE — 81025 URINE PREGNANCY TEST: CPT

## 2021-09-17 PROCEDURE — 64495 INJ PARAVERT F JNT L/S 3 LEV: CPT

## 2021-09-17 PROCEDURE — 2500000003 HC RX 250 WO HCPCS: Performed by: PAIN MEDICINE

## 2021-09-17 PROCEDURE — 64494 INJ PARAVERT F JNT L/S 2 LEV: CPT

## 2021-09-17 PROCEDURE — 6360000002 HC RX W HCPCS: Performed by: PAIN MEDICINE

## 2021-09-17 RX ORDER — BUPIVACAINE HYDROCHLORIDE 2.5 MG/ML
INJECTION, SOLUTION EPIDURAL; INFILTRATION; INTRACAUDAL
Status: COMPLETED | OUTPATIENT
Start: 2021-09-17 | End: 2021-09-17

## 2021-09-17 RX ORDER — SODIUM CHLORIDE 9 MG/ML
INJECTION INTRAVENOUS
Status: COMPLETED | OUTPATIENT
Start: 2021-09-17 | End: 2021-09-17

## 2021-09-17 RX ORDER — MIDAZOLAM HYDROCHLORIDE 2 MG/2ML
INJECTION, SOLUTION INTRAMUSCULAR; INTRAVENOUS
Status: COMPLETED | OUTPATIENT
Start: 2021-09-17 | End: 2021-09-17

## 2021-09-17 RX ORDER — LIDOCAINE HYDROCHLORIDE 10 MG/ML
INJECTION, SOLUTION EPIDURAL; INFILTRATION; INTRACAUDAL; PERINEURAL
Status: COMPLETED | OUTPATIENT
Start: 2021-09-17 | End: 2021-09-17

## 2021-09-17 RX ORDER — SODIUM CHLORIDE 0.9 % (FLUSH) 0.9 %
SYRINGE (ML) INJECTION
Status: COMPLETED | OUTPATIENT
Start: 2021-09-17 | End: 2021-09-17

## 2021-09-17 RX ORDER — FENTANYL CITRATE 50 UG/ML
INJECTION, SOLUTION INTRAMUSCULAR; INTRAVENOUS
Status: COMPLETED | OUTPATIENT
Start: 2021-09-17 | End: 2021-09-17

## 2021-09-17 RX ADMIN — FENTANYL CITRATE 100 MCG: 50 INJECTION INTRAMUSCULAR; INTRAVENOUS at 12:03

## 2021-09-17 RX ADMIN — LIDOCAINE HYDROCHLORIDE 3 ML: 10 INJECTION, SOLUTION EPIDURAL; INFILTRATION; INTRACAUDAL at 12:01

## 2021-09-17 RX ADMIN — MIDAZOLAM HYDROCHLORIDE 2 MG: 1 INJECTION, SOLUTION INTRAMUSCULAR; INTRAVENOUS at 12:01

## 2021-09-17 RX ADMIN — Medication 3 ML: at 12:01

## 2021-09-17 RX ADMIN — BUPIVACAINE HYDROCHLORIDE 6 ML: 2.5 INJECTION, SOLUTION EPIDURAL; INFILTRATION; INTRACAUDAL; PERINEURAL at 12:05

## 2021-09-17 RX ADMIN — SODIUM CHLORIDE 3 ML: 9 INJECTION INTRAMUSCULAR; INTRAVENOUS; SUBCUTANEOUS at 12:01

## 2021-09-17 ASSESSMENT — PAIN DESCRIPTION - PROGRESSION: CLINICAL_PROGRESSION: GRADUALLY IMPROVING

## 2021-09-17 ASSESSMENT — PAIN DESCRIPTION - LOCATION: LOCATION: BACK

## 2021-09-17 ASSESSMENT — PAIN - FUNCTIONAL ASSESSMENT: PAIN_FUNCTIONAL_ASSESSMENT: ACTIVITIES ARE NOT PREVENTED

## 2021-09-17 ASSESSMENT — PAIN SCALES - GENERAL
PAINLEVEL_OUTOF10: 0
PAINLEVEL_OUTOF10: 3

## 2021-09-17 ASSESSMENT — PAIN DESCRIPTION - DESCRIPTORS: DESCRIPTORS: ACHING;DULL

## 2021-09-17 ASSESSMENT — PAIN DESCRIPTION - ONSET: ONSET: GRADUAL

## 2021-09-17 ASSESSMENT — PAIN DESCRIPTION - FREQUENCY: FREQUENCY: CONTINUOUS

## 2021-09-17 ASSESSMENT — PAIN DESCRIPTION - PAIN TYPE: TYPE: CHRONIC PAIN

## 2021-09-17 ASSESSMENT — PAIN DESCRIPTION - ORIENTATION: ORIENTATION: LOWER

## 2021-09-17 NOTE — OP NOTE
Lumbar Facet Nerve Block Injection:  Surgeon: Jose Juan Jackson MD     PRE-OP DIAGNOSIS: M47.817 (lumbosacral spondylosis), M54.5 (low back pain)    POST-OP DIAGNOSIS: Same. PROCEDURE PERFORMED: Lumbar Facet Nerve Block Multiple Levels  Bilateral L4 - 5 and L5 - S1. Physician confirmed and marked the surgical site. EBL: minimal      CONSENT: Patient has undergone the educational process with this procedure, is aware and fully understands the risks involved: potential damage to any and all body organs including possible bleeding, infection and nerve injury, allergic reaction and headache. Patient also understands that the procedure will be undertaken in a safe, controlled, and monitored setting. Patient recognizes that the benefits include relief from pain and reduction in the oral use of medications. Patient agreed to proceed. The patient was counseled at length about the risks of grazyna Covid-19 during their perioperative period and any recovery window from their procedure. The patient was made aware that grazyna Covid-19  may worsen their prognosis for recovering from their procedure  and lend to a higher morbidity and/or mortality risk. All material risks, benefits, and reasonable alternatives including postponing the procedure were discussed. The patient does wish to proceed with the procedure at this time. PREP: Timeout was performed prior to starting the procedure. The patient's back was prepped with chloroprep and draped appropriately. 5ml of 0.5% lidocaine was used to anesthetize the skin and subcutaneous tissue. PROCEDURE NOTE: A 22 gauge 3.5 inch spinal needle was advanced under  fluoroscopic guidance to the appropriate anatomic location for the medial branches corresponding to the facets at the base of the appropriate superior articular process and/or sacral ala . Aspiration was negative for blood, CSF and producing pain.  1 ml of 0.25% marcaine was then injected at each

## 2021-09-17 NOTE — H&P
TWICE DAILY, Disp: , Rfl:     pioglitazone (ACTOS) 30 MG tablet, TAKE 1 TABLET BY MOUTH IN THE MORNING (Patient not taking: Reported on 6/21/2021), Disp: , Rfl:     nystatin (MYCOSTATIN) 761711 UNIT/GM powder, APPLY TO THE AFFECTED AREA TWICE DAILY AS NEEDED, Disp: , Rfl:     levothyroxine (SYNTHROID) 112 MCG tablet, TAKE 1 TABLET BY MOUTH EVERY DAY, Disp: , Rfl:     gabapentin (NEURONTIN) 300 MG capsule, , Disp: , Rfl:     FEROSUL 325 (65 Fe) MG tablet, TAKE 1 TABLET BY MOUTH TWO TIMES A DAY, Disp: , Rfl:     ISIBLOOM 0.15-30 MG-MCG per tablet, TAKE 1 TABLET BY MOUTH EVERY DAY CONTINUOUSLY, Disp: , Rfl:     D3-50 1.25 MG (64586 UT) CAPS, , Disp: , Rfl:     Levothyroxine Sodium 112 MCG CAPS, Take 112 mcg by mouth daily, Disp: , Rfl:     omeprazole (PRILOSEC) 20 MG delayed release capsule, TAKE 1 CAPSULE BY MOUTH EVERY DAY, Disp: , Rfl:     glipiZIDE (GLUCOTROL) 10 MG tablet, , Disp: , Rfl:     alogliptin (NESINA) 25 MG TABS tablet, TAKE 1 TABLET BY MOUTH EVERY DAY, Disp: , Rfl:     metFORMIN (GLUCOPHAGE) 500 MG tablet, Take 500 mg by mouth 3 times daily, Disp: , Rfl:     liothyronine (CYTOMEL) 5 MCG tablet, Take 5 mcg by mouth 2 times daily, Disp: , Rfl:     Cetirizine HCl (ZYRTEC ALLERGY PO), Take by mouth, Disp: , Rfl:     B Complex-Folic Acid (Z-352 BALANCED TR PO), Take 1 tablet by mouth 3 times daily (before meals), Disp: , Rfl:     vitamin D (CHOLECALCIFEROL) 5000 UNITS CAPS capsule, Take 5,000 Units by mouth daily, Disp: , Rfl:     Calcium-Magnesium-Vitamin D 520-202-955 MG-MG-UNIT TABS, Take 1 tablet by mouth 3 times daily (before meals), Disp: , Rfl:     Norgestim-Eth Estrad Triphasic (TRI-PREVIFEM) 0.18/0.215/0.25 MG-35 MCG TABS, Take 1 tablet by mouth daily (Patient not taking: Reported on 6/21/2021), Disp: , Rfl:   No current facility-administered medications for this encounter.     Facility-Administered Medications Ordered in Other Encounters:     thyrotropin dajuan (THYROGEN) injection 0.9 mg, 0.9 mg, IntraMUSCular, Once, Rob Roldan MD    Social History     Tobacco Use    Smoking status: Never Smoker    Smokeless tobacco: Never Used   Substance Use Topics    Alcohol use: No       Review of Systems:   Focused review of systems was performed, and negative as pertinent to diagnosis, except as stated in HPI. Physical Exam  Constitutional:       Appearance: Normal appearance. Pulmonary:      Effort: Pulmonary effort is normal.   Neurological:      Mental Status: He is alert. Psychiatric:         Attention and Perception: Attention and perception normal.         Mood and Affect: Mood and affect normal.         Patient's current physical status, medications, medical history, and HPI have been reviewed and updated as appropriate on this date: 09/17/21    Risk/Benefit(s): The risks, benefits, alternatives, and potential complications have been discussed with the patient/family and informed consent has been obtained for the procedure/sedation.     Diagnosis:   spondylosis      Plan: lumbar mbb        Ana Holden MD

## 2021-09-20 ENCOUNTER — TELEPHONE (OUTPATIENT)
Dept: PAIN MANAGEMENT | Age: 41
End: 2021-09-20

## 2021-09-20 DIAGNOSIS — M47.817 LUMBOSACRAL SPONDYLOSIS WITHOUT MYELOPATHY: Primary | ICD-10-CM

## 2021-09-20 DIAGNOSIS — G89.29 CHRONIC BILATERAL LOW BACK PAIN, UNSPECIFIED WHETHER SCIATICA PRESENT: ICD-10-CM

## 2021-09-20 DIAGNOSIS — M54.50 CHRONIC BILATERAL LOW BACK PAIN, UNSPECIFIED WHETHER SCIATICA PRESENT: ICD-10-CM

## 2021-09-20 NOTE — TELEPHONE ENCOUNTER
Patient states pain was 4 out of 10 prior to the procedure and 1 out of 10 after the procedure with 80% pain relief. Her activities included moving chairs and walking. She feels it was successful and would like to proceed with the next steps.

## 2021-09-30 DIAGNOSIS — M54.50 CHRONIC LOW BACK PAIN, UNSPECIFIED BACK PAIN LATERALITY, UNSPECIFIED WHETHER SCIATICA PRESENT: ICD-10-CM

## 2021-09-30 DIAGNOSIS — G89.29 CHRONIC LOW BACK PAIN, UNSPECIFIED BACK PAIN LATERALITY, UNSPECIFIED WHETHER SCIATICA PRESENT: ICD-10-CM

## 2021-10-01 RX ORDER — IBUPROFEN 800 MG/1
TABLET ORAL
Qty: 90 TABLET | Refills: 0 | Status: SHIPPED | OUTPATIENT
Start: 2021-10-01 | End: 2021-10-28

## 2021-10-07 ENCOUNTER — HOSPITAL ENCOUNTER (OUTPATIENT)
Dept: PAIN MANAGEMENT | Facility: CLINIC | Age: 41
Discharge: HOME | End: 2021-10-07
Payer: COMMERCIAL

## 2021-10-07 VITALS
OXYGEN SATURATION: 97 % | SYSTOLIC BLOOD PRESSURE: 116 MMHG | DIASTOLIC BLOOD PRESSURE: 70 MMHG | TEMPERATURE: 99.6 F | HEART RATE: 72 BPM | HEIGHT: 65 IN | RESPIRATION RATE: 13 BRPM | BODY MASS INDEX: 25.66 KG/M2 | WEIGHT: 154 LBS

## 2021-10-07 VITALS
OXYGEN SATURATION: 99 % | SYSTOLIC BLOOD PRESSURE: 127 MMHG | DIASTOLIC BLOOD PRESSURE: 85 MMHG | HEART RATE: 76 BPM | RESPIRATION RATE: 16 BRPM

## 2021-10-07 DIAGNOSIS — R52 PAIN: ICD-10-CM

## 2021-10-07 LAB — GLUCOSE BLD-MCNC: 117 MG/DL (ref 65–105)

## 2021-10-07 PROCEDURE — 64636 DESTROY L/S FACET JNT ADDL: CPT

## 2021-10-07 PROCEDURE — 2580000003 HC RX 258: Performed by: PAIN MEDICINE

## 2021-10-07 PROCEDURE — 64635 DESTROY LUMB/SAC FACET JNT: CPT

## 2021-10-07 PROCEDURE — 6360000002 HC RX W HCPCS: Performed by: PAIN MEDICINE

## 2021-10-07 PROCEDURE — 64636 DESTROY L/S FACET JNT ADDL: CPT | Performed by: PAIN MEDICINE

## 2021-10-07 PROCEDURE — 82947 ASSAY GLUCOSE BLOOD QUANT: CPT

## 2021-10-07 PROCEDURE — 64635 DESTROY LUMB/SAC FACET JNT: CPT | Performed by: PAIN MEDICINE

## 2021-10-07 PROCEDURE — 2500000003 HC RX 250 WO HCPCS: Performed by: PAIN MEDICINE

## 2021-10-07 RX ORDER — BUPIVACAINE HYDROCHLORIDE 2.5 MG/ML
INJECTION, SOLUTION EPIDURAL; INFILTRATION; INTRACAUDAL
Status: COMPLETED | OUTPATIENT
Start: 2021-10-07 | End: 2021-10-07

## 2021-10-07 RX ORDER — FENTANYL CITRATE 50 UG/ML
INJECTION, SOLUTION INTRAMUSCULAR; INTRAVENOUS
Status: COMPLETED | OUTPATIENT
Start: 2021-10-07 | End: 2021-10-07

## 2021-10-07 RX ORDER — MIDAZOLAM HYDROCHLORIDE 2 MG/2ML
INJECTION, SOLUTION INTRAMUSCULAR; INTRAVENOUS
Status: COMPLETED | OUTPATIENT
Start: 2021-10-07 | End: 2021-10-07

## 2021-10-07 RX ORDER — LIDOCAINE HYDROCHLORIDE 10 MG/ML
INJECTION, SOLUTION EPIDURAL; INFILTRATION; INTRACAUDAL; PERINEURAL
Status: COMPLETED | OUTPATIENT
Start: 2021-10-07 | End: 2021-10-07

## 2021-10-07 RX ADMIN — MIDAZOLAM HYDROCHLORIDE 2 MG: 1 INJECTION, SOLUTION INTRAMUSCULAR; INTRAVENOUS at 12:13

## 2021-10-07 RX ADMIN — FENTANYL CITRATE 100 MCG: 50 INJECTION INTRAMUSCULAR; INTRAVENOUS at 12:14

## 2021-10-07 RX ADMIN — LIDOCAINE HYDROCHLORIDE 3 ML: 10 INJECTION, SOLUTION EPIDURAL; INFILTRATION; INTRACAUDAL at 12:13

## 2021-10-07 RX ADMIN — BUPIVACAINE HYDROCHLORIDE 10 ML: 2.5 INJECTION, SOLUTION EPIDURAL; INFILTRATION; INTRACAUDAL; PERINEURAL at 12:16

## 2021-10-07 RX ADMIN — WATER 3 ML: 1 INJECTION INTRAMUSCULAR; INTRAVENOUS; SUBCUTANEOUS at 12:13

## 2021-10-07 ASSESSMENT — PAIN - FUNCTIONAL ASSESSMENT
PAIN_FUNCTIONAL_ASSESSMENT: ACTIVITIES ARE NOT PREVENTED
PAIN_FUNCTIONAL_ASSESSMENT: 0-10

## 2021-10-07 ASSESSMENT — PAIN DESCRIPTION - DESCRIPTORS: DESCRIPTORS: ACHING;DULL

## 2021-10-07 NOTE — OP NOTE
Lumbar Radiofrequency Ablation:  SURGEON: René Bagley MD    PRE-OP DIAGNOSIS: M47.817 (lumbosacral spondylosis), M54.5 (low back pain)    POST-OP DIAGNOSIS: Same. PROCEDURE PERFORMED: Radiofrequency Ablation Medial Branch Nerve at Right L4 - 5 and L5 - S1 facet joint(s). HISTORY AND INDICATIONS: Satisfactory response with previous RFA/ medial branch blocks at the indicated levels. Recurrence of painful symptoms attributed to the above diagnosis. The planned treatment is medically necessary to relieve pain, restore function and or reduce reliance on pain medication. EBL: minimal    CONSENT: Patient has undergone the educational process with this procedure, is aware and fully understands the risks involved: potential damage to any and all body organs including possible bleeding, infection, nerve injury, paralysis, allergic reaction and headache. Patient also understands that the procedure will be undertaken in a safe, controlled and monitored setting. Patient recognizes that the benefits may include relief from pain and reduction in the oral use of medications. Patient agreed to proceed. The patient was counseled at length about the risks of grazyna Covid-19 during their perioperative period and any recovery window from their procedure. The patient was made aware that grazyna Covid-19  may worsen their prognosis for recovering from their procedure  and lend to a higher morbidity and/or mortality risk. All material risks, benefits, and reasonable alternatives including postponing the procedure were discussed. The patient does wish to proceed with the procedure at this time. MONITORS INSTITUTED INCLUDE but not limited to:   Pulse Oximetry  Non-invasive blood pressure monitoring    PROCEDURE NOTE: The patient was taken to the procedure room and placed prone with the appropriate padding and positioning to assure patient comfort and physician access to the procedure site.  Time out was performed prior to starting the procedure. Fluoroscopic evaluation was utilized to target the appropriate treatment areas. The skin was prepped with antiseptic solution and draped sterilely. Then 0.5% lidocaine was used to anesthetize the skin and subcutaneous tissue. Under fluoroscopic guidance a 20 gauge x 10cm x 10mm active tip was advanced to the medial branch nerve at the indicated levels below to innervate the following facet joints Right L4 - 5 and L5 - S1 . The needles were placed sequentially. Position confirmed radiographically with the fluoroscope. Active tip corresponding at the base of the superior articulating process and/or sacral ala for the lumbar RFA. Motor stimulation checked at 2hz up to 3V and confirmed negative for radicular stimulation. After confirmation of the needle placement the patient received 1 ml of 0.25% marcaine to provide anesthesia. Radiofrequency was delivered to the lumbar region at 80 degrees for a limit of 90 seconds in length with no ill effect. The patient tolerated the procedure well and was transported to the recovery room where the patient was monitored with no complications and with stable vital signs. Patient was discharged with appropriate written discharge instructions. Follow-up discussed.       Derrick Gutierrez MD

## 2021-10-07 NOTE — H&P
TWICE DAILY, Disp: , Rfl:     pioglitazone (ACTOS) 30 MG tablet, TAKE 1 TABLET BY MOUTH IN THE MORNING (Patient not taking: Reported on 6/21/2021), Disp: , Rfl:     nystatin (MYCOSTATIN) 976334 UNIT/GM powder, APPLY TO THE AFFECTED AREA TWICE DAILY AS NEEDED, Disp: , Rfl:     levothyroxine (SYNTHROID) 112 MCG tablet, TAKE 1 TABLET BY MOUTH EVERY DAY, Disp: , Rfl:     gabapentin (NEURONTIN) 300 MG capsule, , Disp: , Rfl:     FEROSUL 325 (65 Fe) MG tablet, TAKE 1 TABLET BY MOUTH TWO TIMES A DAY, Disp: , Rfl:     ISIBLOOM 0.15-30 MG-MCG per tablet, TAKE 1 TABLET BY MOUTH EVERY DAY CONTINUOUSLY, Disp: , Rfl:     D3-50 1.25 MG (28613 UT) CAPS, , Disp: , Rfl:     levothyroxine (SYNTHROID) 125 MCG tablet, Take 125 mcg by mouth daily , Disp: , Rfl:     omeprazole (PRILOSEC) 20 MG delayed release capsule, TAKE 1 CAPSULE BY MOUTH EVERY DAY, Disp: , Rfl:     glipiZIDE (GLUCOTROL) 10 MG tablet, , Disp: , Rfl:     alogliptin (NESINA) 25 MG TABS tablet, TAKE 1 TABLET BY MOUTH EVERY DAY, Disp: , Rfl:     metFORMIN (GLUCOPHAGE) 500 MG tablet, Take 500 mg by mouth 3 times daily, Disp: , Rfl:     liothyronine (CYTOMEL) 5 MCG tablet, Take 5 mcg by mouth 2 times daily, Disp: , Rfl:     Cetirizine HCl (ZYRTEC ALLERGY PO), Take by mouth, Disp: , Rfl:     B Complex-Folic Acid (M-338 BALANCED TR PO), Take 1 tablet by mouth 3 times daily (before meals), Disp: , Rfl:     vitamin D (CHOLECALCIFEROL) 5000 UNITS CAPS capsule, Take 5,000 Units by mouth daily, Disp: , Rfl:     Calcium-Magnesium-Vitamin D 524-525-197 MG-MG-UNIT TABS, Take 1 tablet by mouth 3 times daily (before meals), Disp: , Rfl:     Norgestim-Eth Estrad Triphasic (TRI-PREVIFEM) 0.18/0.215/0.25 MG-35 MCG TABS, Take 1 tablet by mouth daily (Patient not taking: Reported on 6/21/2021), Disp: , Rfl:   No current facility-administered medications for this encounter.     Facility-Administered Medications Ordered in Other Encounters:     thyrotropin dajuan (THYROGEN) injection 0.9 mg, 0.9 mg, IntraMUSCular, Once, Moises Aguirre MD    Social History     Tobacco Use    Smoking status: Never Smoker    Smokeless tobacco: Never Used   Substance Use Topics    Alcohol use: No       Review of Systems:   Focused review of systems was performed, and negative as pertinent to diagnosis, except as stated in HPI. Physical Exam  Constitutional:       Appearance: Normal appearance. Pulmonary:      Effort: Pulmonary effort is normal.   Neurological:      Mental Status: He is alert. Psychiatric:         Attention and Perception: Attention and perception normal.         Mood and Affect: Mood and affect normal.         Patient's current physical status, medications, medical history, and HPI have been reviewed and updated as appropriate on this date: 10/07/21    Risk/Benefit(s): The risks, benefits, alternatives, and potential complications have been discussed with the patient/family and informed consent has been obtained for the procedure/sedation.     Diagnosis:   spondylosis      Plan: hector Culp MD

## 2021-10-21 ENCOUNTER — HOSPITAL ENCOUNTER (OUTPATIENT)
Dept: PAIN MANAGEMENT | Facility: CLINIC | Age: 41
Discharge: HOME | End: 2021-10-21
Payer: COMMERCIAL

## 2021-10-21 VITALS
RESPIRATION RATE: 8 BRPM | SYSTOLIC BLOOD PRESSURE: 126 MMHG | TEMPERATURE: 97.1 F | BODY MASS INDEX: 25.66 KG/M2 | DIASTOLIC BLOOD PRESSURE: 56 MMHG | HEIGHT: 65 IN | HEART RATE: 85 BPM | WEIGHT: 154 LBS | OXYGEN SATURATION: 98 %

## 2021-10-21 DIAGNOSIS — R52 PAIN MANAGEMENT: ICD-10-CM

## 2021-10-21 LAB — HCG, PREGNANCY URINE (POC): NEGATIVE

## 2021-10-21 PROCEDURE — 2580000003 HC RX 258: Performed by: PAIN MEDICINE

## 2021-10-21 PROCEDURE — 6360000002 HC RX W HCPCS: Performed by: PAIN MEDICINE

## 2021-10-21 PROCEDURE — 81025 URINE PREGNANCY TEST: CPT

## 2021-10-21 PROCEDURE — 2500000003 HC RX 250 WO HCPCS: Performed by: PAIN MEDICINE

## 2021-10-21 PROCEDURE — 64636 DESTROY L/S FACET JNT ADDL: CPT | Performed by: PAIN MEDICINE

## 2021-10-21 PROCEDURE — 64636 DESTROY L/S FACET JNT ADDL: CPT

## 2021-10-21 PROCEDURE — 64635 DESTROY LUMB/SAC FACET JNT: CPT

## 2021-10-21 PROCEDURE — 64635 DESTROY LUMB/SAC FACET JNT: CPT | Performed by: PAIN MEDICINE

## 2021-10-21 RX ORDER — LIDOCAINE HYDROCHLORIDE 10 MG/ML
INJECTION, SOLUTION EPIDURAL; INFILTRATION; INTRACAUDAL; PERINEURAL
Status: COMPLETED | OUTPATIENT
Start: 2021-10-21 | End: 2021-10-21

## 2021-10-21 RX ORDER — MIDAZOLAM HYDROCHLORIDE 2 MG/2ML
INJECTION, SOLUTION INTRAMUSCULAR; INTRAVENOUS
Status: COMPLETED | OUTPATIENT
Start: 2021-10-21 | End: 2021-10-21

## 2021-10-21 RX ORDER — FENTANYL CITRATE 50 UG/ML
INJECTION, SOLUTION INTRAMUSCULAR; INTRAVENOUS
Status: COMPLETED | OUTPATIENT
Start: 2021-10-21 | End: 2021-10-21

## 2021-10-21 RX ORDER — BUPIVACAINE HYDROCHLORIDE 2.5 MG/ML
INJECTION, SOLUTION EPIDURAL; INFILTRATION; INTRACAUDAL
Status: COMPLETED | OUTPATIENT
Start: 2021-10-21 | End: 2021-10-21

## 2021-10-21 RX ADMIN — MIDAZOLAM HYDROCHLORIDE 2 MG: 1 INJECTION, SOLUTION INTRAMUSCULAR; INTRAVENOUS at 12:27

## 2021-10-21 RX ADMIN — WATER 3 ML: 1 INJECTION INTRAMUSCULAR; INTRAVENOUS; SUBCUTANEOUS at 12:31

## 2021-10-21 RX ADMIN — LIDOCAINE HYDROCHLORIDE 3 ML: 10 INJECTION, SOLUTION EPIDURAL; INFILTRATION; INTRACAUDAL at 12:30

## 2021-10-21 RX ADMIN — BUPIVACAINE HYDROCHLORIDE 5 ML: 2.5 INJECTION, SOLUTION EPIDURAL; INFILTRATION; INTRACAUDAL; PERINEURAL at 12:31

## 2021-10-21 RX ADMIN — FENTANYL CITRATE 100 MCG: 50 INJECTION INTRAMUSCULAR; INTRAVENOUS at 12:30

## 2021-10-21 ASSESSMENT — PAIN - FUNCTIONAL ASSESSMENT
PAIN_FUNCTIONAL_ASSESSMENT: 0-10
PAIN_FUNCTIONAL_ASSESSMENT: PREVENTS OR INTERFERES WITH MANY ACTIVE NOT PASSIVE ACTIVITIES

## 2021-10-21 ASSESSMENT — PAIN DESCRIPTION - DESCRIPTORS: DESCRIPTORS: ACHING

## 2021-10-21 NOTE — H&P
Pain Pre-Op H&P Note    Claritza Reyes MD    HPI: Sneha Morales  presents with back pain.     Past Medical History:   Diagnosis Date    Asthma     Cancer [facility])     Thyroid    Controlled type 2 diabetes mellitus without complication, without long-term current use of insulin (HCC)     Thyroid cancer [facility])        Past Surgical History:   Procedure Laterality Date     SECTION      NERVE BLOCK Right 2021    : LUMBAR TRANSFORAMINAL L4-5 (Right )    PAIN MANAGEMENT PROCEDURE Right 2021    LUMBAR TRANSFORAMINAL L4-5 performed by Claritza Reyes MD at [address] [place] [address]  2021    LUMBAR TRANSFORAMINAL L4/5 - Right    PAIN MANAGEMENT PROCEDURE Right 2021    LUMBAR TRANSFORAMINAL L4/5 performed by Claritza Reyes MD at [address] [place] [address] Right 2021    RIGHT L4/5 LUMBAR TRANSFORAMINAL - Right    PAIN MANAGEMENT PROCEDURE Right 2021    RIGHT L4/5 LUMBAR TRANSFORAMINAL performed by Claritza Reyes MD at [address] [place] [address] 2017       Family History   Problem Relation Age of Onset    Other Mother         MTHFR Mutation    Diabetes Father         IDDM    Stroke Father     Heart Disease Father     Heart Attack Father     Arrhythmia Father     Arthritis Maternal Grandmother     Cancer Maternal Grandmother     Heart Failure Maternal Grandmother     Neuropathy Maternal Grandmother     Parkinsonism Maternal Grandfather     Arthritis Maternal Grandfather     Cancer Maternal Grandfather     High Blood Pressure Paternal Grandmother     Prostate Cancer Paternal Grandfather     Cancer Paternal Grandfather        No Known Allergies      Current Outpatient Medications:     ibuprofen (ADVIL;MOTRIN) 800 MG tablet, TAKE 1 TABLET BY MOUTH EVERY EIGHT HOURS AS NEEDED FOR PAIN, Disp: 90 tablet, Rfl: 0    nystatin (MYCOSTATIN) 089879 UNIT/GM powder, APPLY TO THE AFFECTED AREA TWICE DAILY AS NEEDED, Disp: , Rfl:     gabapentin (NEURONTIN) 300 MG capsule, , Disp: , Rfl:     ISIBLOOM 0.15-30 MG-MCG per tablet, TAKE 1 TABLET BY MOUTH EVERY DAY CONTINUOUSLY, Disp: , Rfl:     levothyroxine (SYNTHROID) 125 MCG tablet, Take 125 mcg by mouth daily , Disp: , Rfl:     omeprazole (PRILOSEC) 20 MG delayed release capsule, TAKE 1 CAPSULE BY MOUTH EVERY DAY, Disp: , Rfl:     glipiZIDE (GLUCOTROL) 10 MG tablet, , Disp: , Rfl:     alogliptin (NESINA) 25 MG TABS tablet, TAKE 1 TABLET BY MOUTH EVERY DAY, Disp: , Rfl:     metFORMIN (GLUCOPHAGE) 500 MG tablet, Take 500 mg by mouth 3 times daily, Disp: , Rfl:     liothyronine (CYTOMEL) 5 MCG tablet, Take 5 mcg by mouth 2 times daily, Disp: , Rfl:     Cetirizine HCl (ZYRTEC ALLERGY PO), Take by mouth, Disp: , Rfl:     B Complex-Folic Acid (T-718 BALANCED TR PO), Take 1 tablet by mouth 3 times daily (before meals), Disp: , Rfl:     vitamin D (CHOLECALCIFEROL) 5000 UNITS CAPS capsule, Take 5,000 Units by mouth daily, Disp: , Rfl:     Calcium-Magnesium-Vitamin D [phone] MG-MG-UNIT TABS, Take 1 tablet by mouth 3 times daily (before meals), Disp: , Rfl:     Norgestim-Eth Estrad Triphasic (TRI-PREVIFEM) 0.18/0.215/0.25 MG-35 MCG TABS, Take 1 tablet by mouth daily , Disp: , Rfl:     triamcinolone (ARISTOCORT) 0.5 % cream, APPLY A THIN LAYER TO THE AFFECTED AREA TWICE DAILY, Disp: , Rfl:     pioglitazone (ACTOS) 30 MG tablet, TAKE 1 TABLET BY MOUTH IN THE MORNING (Patient not taking: Reported on 6/21/2021), Disp: , Rfl:     FEROSUL 325 (65 Fe) MG tablet, TAKE 1 TABLET BY MOUTH TWO TIMES A DAY, Disp: , Rfl:     D3-50 1.25 MG (33284 UT) CAPS, , Disp: , Rfl:   No current facility-administered medications for this encounter.     Facility-Administered Medications Ordered in Other Encounters:     thyrotropin dajuan (THYROGEN) injection 0.9 mg, 0.9 mg, IntraMUSCular, Once, David Kenyon MD    Social History     Tobacco Use    Smoking status: Never Smoker    Smokeless tobacco: Never Used   Substance Use Topics    Alcohol use: No       Review of Systems:   Focused review of systems was performed, and negative as pertinent to diagnosis, except as stated in HPI. Physical Exam  Constitutional:       Appearance: Normal appearance. Pulmonary:      Effort: Pulmonary effort is normal.   Neurological:      Mental Status: He is alert. Psychiatric:         Attention and Perception: Attention and perception normal.         Mood and Affect: Mood and affect normal.         Patient's current physical status, medications, medical history, and HPI have been reviewed and updated as appropriate on this date: 10/21/21    Risk/Benefit(s): The risks, benefits, alternatives, and potential complications have been discussed with the patient/family and informed consent has been obtained for the procedure/sedation.     Diagnosis:   spondylosis      Plan: rfa Gwendloyn Castleman, MD

## 2021-10-27 DIAGNOSIS — M54.50 CHRONIC LOW BACK PAIN, UNSPECIFIED BACK PAIN LATERALITY, UNSPECIFIED WHETHER SCIATICA PRESENT: ICD-10-CM

## 2021-10-27 DIAGNOSIS — G89.29 CHRONIC LOW BACK PAIN, UNSPECIFIED BACK PAIN LATERALITY, UNSPECIFIED WHETHER SCIATICA PRESENT: ICD-10-CM

## 2021-10-28 RX ORDER — IBUPROFEN 800 MG/1
TABLET ORAL
Qty: 90 TABLET | Refills: 0 | Status: SHIPPED | OUTPATIENT
Start: 2021-10-28 | End: 2021-11-29

## 2021-11-26 DIAGNOSIS — M54.50 CHRONIC LOW BACK PAIN, UNSPECIFIED BACK PAIN LATERALITY, UNSPECIFIED WHETHER SCIATICA PRESENT: ICD-10-CM

## 2021-11-26 DIAGNOSIS — G89.29 CHRONIC LOW BACK PAIN, UNSPECIFIED BACK PAIN LATERALITY, UNSPECIFIED WHETHER SCIATICA PRESENT: ICD-10-CM

## 2021-11-29 RX ORDER — IBUPROFEN 800 MG/1
TABLET ORAL
Qty: 90 TABLET | Refills: 0 | Status: SHIPPED | OUTPATIENT
Start: 2021-11-29 | End: 2022-03-07

## 2022-01-20 ENCOUNTER — OFFICE VISIT (OUTPATIENT)
Dept: ORTHOPEDIC SURGERY | Age: 42
End: 2022-01-20
Payer: COMMERCIAL

## 2022-01-20 DIAGNOSIS — M54.50 LOW BACK PAIN, UNSPECIFIED BACK PAIN LATERALITY, UNSPECIFIED CHRONICITY, UNSPECIFIED WHETHER SCIATICA PRESENT: ICD-10-CM

## 2022-01-20 DIAGNOSIS — M65.4 RADIAL STYLOID TENOSYNOVITIS OF RIGHT HAND: ICD-10-CM

## 2022-01-20 DIAGNOSIS — M65.4 DE QUERVAIN'S TENOSYNOVITIS, RIGHT: Primary | ICD-10-CM

## 2022-01-20 PROCEDURE — G8419 CALC BMI OUT NRM PARAM NOF/U: HCPCS | Performed by: ORTHOPAEDIC SURGERY

## 2022-01-20 PROCEDURE — 1036F TOBACCO NON-USER: CPT | Performed by: ORTHOPAEDIC SURGERY

## 2022-01-20 PROCEDURE — 20550 NJX 1 TENDON SHEATH/LIGAMENT: CPT | Performed by: ORTHOPAEDIC SURGERY

## 2022-01-20 PROCEDURE — G8484 FLU IMMUNIZE NO ADMIN: HCPCS | Performed by: ORTHOPAEDIC SURGERY

## 2022-01-20 PROCEDURE — 99213 OFFICE O/P EST LOW 20 MIN: CPT | Performed by: ORTHOPAEDIC SURGERY

## 2022-01-20 PROCEDURE — G8427 DOCREV CUR MEDS BY ELIG CLIN: HCPCS | Performed by: ORTHOPAEDIC SURGERY

## 2022-01-20 RX ORDER — BUPIVACAINE HYDROCHLORIDE 5 MG/ML
2 INJECTION, SOLUTION PERINEURAL ONCE
Status: COMPLETED | OUTPATIENT
Start: 2022-01-20 | End: 2022-01-20

## 2022-01-20 RX ORDER — TRIAMCINOLONE ACETONIDE 40 MG/ML
40 INJECTION, SUSPENSION INTRA-ARTICULAR; INTRAMUSCULAR ONCE
Status: COMPLETED | OUTPATIENT
Start: 2022-01-20 | End: 2022-01-20

## 2022-01-20 NOTE — PROGRESS NOTES
Patient ID: Fatoumata Quiros is a 42 y.o. female    Chief Compliant:  Chief Complaint   Patient presents with    Follow-up     low back right side        Diagnostic imaging:  AP lateral oblique right wrist age-appropriate minimal CMC arthritis    AP pelvis lateral right hip normal in advertently ordered will not charge for study    Assessment and Plan:  1. Low back pain, unspecified back pain laterality, unspecified chronicity, unspecified whether sciatica present    2. Radial styloid tenosynovitis of right hand    3. De Quervain's tenosynovitis, right      Right DeQuervain's tendon injection    Refer to pain management for lumbar epidural steroid injections    Follow up 3 months    An informed verbal consent for the procedure was obtained and risks including, but not limited to: allergy to medications, injection, bleeding, stiffness of joint, recurrence of symptoms, loss of function, swelling, drainage, irrigation, need for surgery and pseudo-septic inflammation, were explained to the patient. Also, discussed was the potential for further injections, irrigation and debridement and surgery. Alternate means of treatment have also been discussed with the patient. An informed verbal consent for the procedure was obtained and risks including, but not limited to: allergy to medications, injection, bleeding, stiffness of joint, recurrence of symptoms, loss of function, swelling, drainage, irrigation, need for surgery and pseudo-septic inflammation, were explained to the patient. Also, discussed was the potential for further injections, irrigation and debridement and surgery. Alternate means of treatment have also been discussed with the patient.     Administrations This Visit       lidocaine 1 % injection 1 mL       Admin Date  10/21/2022  09:03 Action  Given Dose  1 mL Route  Other Site  Hand Right Administered By  David Harris ATC    Ordering Provider: Michael Cortez MD    NDC: 8288-2010-14    Lot#: [id number] : [address]    Patient Supplied?: No    Comments: Thumb tendon sheath              triamcinolone acetonide (KENALOG-40) injection 40 mg       Admin Date  10/21/2022  09:04 Action  Given Dose  40 mg Route  Other Site  Finger (See Comments) Administered By  Abdoul Elliott ATC    Ordering Provider: Meghann Belle MD    NDC: 3729-5797-30    Lot#: [id number]    : [employer] ([facility]IMARY [facility])    Patient Supplied?: No    Comments: Rt thumb tendon sheath                        HPI:  This is a 42 y.o. female who presents to the clinic today for hip and back pain. Patient notes ongoing lower back and right radicular hip pain that remains tolerable however pain has begun to increase over the last 4 weeks. She received 3 LESI with pain management in the past, has not had any additional injections since the last visit. \    She reports right thumb pain and swelling    Review of Systems   All other systems reviewed and are negative.       Past History:    Current Outpatient Medications:     ibuprofen (ADVIL;MOTRIN) 800 MG tablet, TAKE 1 TABLET BY MOUTH EVERY EIGHT HOURS AS NEEDED FOR PAIN, Disp: 90 tablet, Rfl: 0    triamcinolone (ARISTOCORT) 0.5 % cream, APPLY A THIN LAYER TO THE AFFECTED AREA TWICE DAILY, Disp: , Rfl:     pioglitazone (ACTOS) 30 MG tablet, TAKE 1 TABLET BY MOUTH IN THE MORNING (Patient not taking: Reported on 6/21/2021), Disp: , Rfl:     nystatin (MYCOSTATIN) 930075 UNIT/GM powder, APPLY TO THE AFFECTED AREA TWICE DAILY AS NEEDED, Disp: , Rfl:     gabapentin (NEURONTIN) 300 MG capsule, , Disp: , Rfl:     FEROSUL 325 (65 Fe) MG tablet, TAKE 1 TABLET BY MOUTH TWO TIMES A DAY, Disp: , Rfl:     ISIBLOOM 0.15-30 MG-MCG per tablet, TAKE 1 TABLET BY MOUTH EVERY DAY CONTINUOUSLY, Disp: , Rfl:     D3-50 1.25 MG (41503 UT) CAPS, , Disp: , Rfl:     levothyroxine (SYNTHROID) 125 MCG tablet, Take 125 mcg by mouth daily , Disp: , Rfl:     omeprazole (PRILOSEC) 20 MG delayed release capsule, TAKE 1 CAPSULE BY MOUTH EVERY DAY, Disp: , Rfl:     glipiZIDE (GLUCOTROL) 10 MG tablet, , Disp: , Rfl:     alogliptin (NESINA) 25 MG TABS tablet, TAKE 1 TABLET BY MOUTH EVERY DAY, Disp: , Rfl:     metFORMIN (GLUCOPHAGE) 500 MG tablet, Take 500 mg by mouth 3 times daily, Disp: , Rfl:     liothyronine (CYTOMEL) 5 MCG tablet, Take 5 mcg by mouth 2 times daily, Disp: , Rfl:     Cetirizine HCl (ZYRTEC ALLERGY PO), Take by mouth, Disp: , Rfl:     B Complex-Folic Acid (R-588 BALANCED TR PO), Take 1 tablet by mouth 3 times daily (before meals), Disp: , Rfl:     vitamin D (CHOLECALCIFEROL) 5000 UNITS CAPS capsule, Take 5,000 Units by mouth daily, Disp: , Rfl:     Calcium-Magnesium-Vitamin D [phone] MG-MG-UNIT TABS, Take 1 tablet by mouth 3 times daily (before meals), Disp: , Rfl:     Norgestim-Eth Estrad Triphasic (TRI-PREVIFEM) 0.18/0.215/0.25 MG-35 MCG TABS, Take 1 tablet by mouth daily , Disp: , Rfl:   No Known Allergies  Social History     Socioeconomic History    Marital status: Single     Spouse name: Not on file    Number of children: Not on file    Years of education: Not on file    Highest education level: Not on file   Occupational History    Not on file   Tobacco Use    Smoking status: Never Smoker    Smokeless tobacco: Never Used   Vaping Use    Vaping Use: Never used   Substance and Sexual Activity    Alcohol use: No    Drug use: No    Sexual activity: Yes     Partners: Male   Other Topics Concern    Not on file   Social History Narrative    ** Merged History Encounter **          Social Determinants of Health     Financial Resource Strain:     Difficulty of Paying Living Expenses: Not on file   Food Insecurity:     Worried About Running Out of Food in the Last Year: Not on file    Theron of Food in the Last Year: Not on file   Transportation Needs:     Lack of Transportation (Medical): Not on file    Lack of Transportation (Non-Medical):  Not on file   Physical Activity:     Days of Exercise per Week: Not on file    Minutes of Exercise per Session: Not on file   Stress:     Feeling of Stress : Not on file   Social Connections:     Frequency of Communication with Friends and Family: Not on file    Frequency of Social Gatherings with Friends and Family: Not on file    Attends Restorationist Services: Not on file    Active Member of Clubs or Organizations: Not on file    Attends Club or Organization Meetings: Not on file    Marital Status: Not on file   Intimate Partner Violence:     Fear of Current or Ex-Partner: Not on file    Emotionally Abused: Not on file    Physically Abused: Not on file    Sexually Abused: Not on file   Housing Stability:     Unable to Pay for Housing in the Last Year: Not on file    Number of [place] in the Last Year: Not on file    Unstable Housing in the Last Year: Not on file     Past Medical History:   Diagnosis Date    Asthma     Cancer ([address]     Thyroid    Controlled type 2 diabetes mellitus without complication, without long-term current use of insulin ([address]     Thyroid cancer [facility])      Past Surgical History:   Procedure Laterality Date     SECTION      NERVE BLOCK Right 2021    : LUMBAR TRANSFORAMINAL L4-5 (Right )    PAIN MANAGEMENT PROCEDURE Right 2021    LUMBAR TRANSFORAMINAL L4-5 performed by Lamar Peres MD at [address] [place] [address]  2021    LUMBAR TRANSFORAMINAL L4/5 - Right    PAIN MANAGEMENT PROCEDURE Right 2021    LUMBAR TRANSFORAMINAL L4/5 performed by Lamar Peres MD at [address] [place] [address] Right 2021    RIGHT L4/5 LUMBAR TRANSFORAMINAL - Right    PAIN MANAGEMENT PROCEDURE Right 2021    RIGHT L4/5 LUMBAR TRANSFORAMINAL performed by Lamar Peres MD at [address] [place] [address] Bilateral 2017     Family History   Problem Relation Age of Onset    Other Mother         MTHFR Mutation    Diabetes Father         IDDM    Stroke Father Heart Disease Father     Heart Attack Father     Arrhythmia Father     Arthritis Maternal Grandmother     Cancer Maternal Grandmother     Heart Failure Maternal Grandmother     Neuropathy Maternal Grandmother     Parkinsonism Maternal Grandfather     Arthritis Maternal Grandfather     Cancer Maternal Grandfather     High Blood Pressure Paternal Grandmother     Prostate Cancer Paternal Grandfather     Cancer Paternal Grandfather         Physical Exam:  Vitals signs and nursing note reviewed. Constitutional:       Appearance: well-developed. HENT:      Head: Normocephalic and atraumatic. Nose: Nose normal.   Eyes:      Conjunctiva/sclera: Conjunctivae normal.   Neck:      Musculoskeletal: Normal range of motion and neck supple. Pulmonary:      Effort: Pulmonary effort is normal. No respiratory distress. Musculoskeletal:      Comments: Normal gait     Skin:     General: Skin is warm and dry. Neurological:      Mental Status: Alert and oriented to person, place, and time. Sensory: No sensory deficit. Psychiatric:         Behavior: Behavior normal.         Thought Content: Thought content normal.    Positive finkelstein's at the right thumb, mild pain with right thumb extension against resistance. Moderate pain on grind maneuver right thumb    Provider Attestation:  Rosette Swanson, personally performed the services described in this documentation. All medical record entries made by the scribe were at my direction and in my presence. I have reviewed the chart and discharge instructions and agree that the records reflect my personal performance and is accurate and complete. Andrew East MD 1/20/22     Scribe Attestation:  By signing my name below, Ariel Tao, attest that this documentation has been prepared under the direction and in the presence of Dr. Casie Huang.  Electronically signed: Julieth Mas, 1/20/22     Please note that this chart was generated using voice recognition Dragon dictation software. Although every effort was made to ensure the accuracy of this automated transcription, some errors in transcription may have occurred.

## 2022-01-24 ENCOUNTER — HOSPITAL ENCOUNTER (OUTPATIENT)
Age: 42
Discharge: HOME | End: 2022-01-24
Payer: COMMERCIAL

## 2022-01-24 ENCOUNTER — HOSPITAL ENCOUNTER (OUTPATIENT)
Dept: ULTRASOUND IMAGING | Facility: CLINIC | Age: 42
Discharge: HOME | End: 2022-01-26
Payer: COMMERCIAL

## 2022-01-24 ENCOUNTER — HOSPITAL ENCOUNTER (OUTPATIENT)
Facility: CLINIC | Age: 42
Discharge: HOME | End: 2022-01-26
Payer: COMMERCIAL

## 2022-01-24 DIAGNOSIS — E89.0 POSTSURGICAL HYPOTHYROIDISM: ICD-10-CM

## 2022-01-24 LAB
ABSOLUTE EOS #: 0.08 K/UL (ref 0–0.44)
ABSOLUTE IMMATURE GRANULOCYTE: 0.04 K/UL (ref 0–0.3)
ABSOLUTE LYMPH #: 2.51 K/UL (ref 1.1–3.7)
ABSOLUTE MONO #: 0.63 K/UL (ref 0.1–1.2)
ALBUMIN SERPL-MCNC: 4.5 G/DL (ref 3.5–5.2)
ALBUMIN/GLOBULIN RATIO: 1.5 (ref 1–2.5)
ALP BLD-CCNC: 75 U/L (ref 35–104)
ALT SERPL-CCNC: 20 U/L (ref 5–33)
ANION GAP SERPL CALCULATED.3IONS-SCNC: 16 MMOL/L (ref 9–17)
AST SERPL-CCNC: 15 U/L
BASOPHILS # BLD: 1 % (ref 0–2)
BASOPHILS ABSOLUTE: 0.05 K/UL (ref 0–0.2)
BILIRUB SERPL-MCNC: 0.2 MG/DL (ref 0.3–1.2)
BUN BLDV-MCNC: 14 MG/DL (ref 6–20)
BUN/CREAT BLD: ABNORMAL (ref 9–20)
CALCIUM SERPL-MCNC: 9.5 MG/DL (ref 8.6–10.4)
CHLORIDE BLD-SCNC: 103 MMOL/L (ref 98–107)
CO2: 23 MMOL/L (ref 20–31)
CREAT SERPL-MCNC: 0.76 MG/DL (ref 0.5–0.9)
DIFFERENTIAL TYPE: ABNORMAL
EOSINOPHILS RELATIVE PERCENT: 1 % (ref 1–4)
FERRITIN: 10 UG/L (ref 13–150)
FOLATE: 16.8 NG/ML
GFR AFRICAN AMERICAN: >60 ML/MIN
GFR NON-AFRICAN AMERICAN: >60 ML/MIN
GFR SERPL CREATININE-BSD FRML MDRD: ABNORMAL ML/MIN/{1.73_M2}
GFR SERPL CREATININE-BSD FRML MDRD: ABNORMAL ML/MIN/{1.73_M2}
GLUCOSE BLD-MCNC: 96 MG/DL (ref 70–99)
HCT VFR BLD CALC: 39.1 % (ref 36.3–47.1)
HEMOGLOBIN: 12.1 G/DL (ref 11.9–15.1)
IMMATURE GRANULOCYTES: 1 %
IRON SATURATION: 8 % (ref 20–55)
IRON: 29 UG/DL (ref 37–145)
LYMPHOCYTES # BLD: 29 % (ref 24–43)
MAGNESIUM: 1.9 MG/DL (ref 1.6–2.6)
MCH RBC QN AUTO: 27.3 PG (ref 25.2–33.5)
MCHC RBC AUTO-ENTMCNC: 30.9 G/DL (ref 28.4–34.8)
MCV RBC AUTO: 88.3 FL (ref 82.6–102.9)
MONOCYTES # BLD: 7 % (ref 3–12)
NRBC AUTOMATED: 0 PER 100 WBC
PDW BLD-RTO: 15.3 % (ref 11.8–14.4)
PHOSPHORUS: 4.5 MG/DL (ref 2.6–4.5)
PLATELET # BLD: 392 K/UL (ref 138–453)
PLATELET ESTIMATE: ABNORMAL
PMV BLD AUTO: 10.8 FL (ref 8.1–13.5)
POTASSIUM SERPL-SCNC: 4.3 MMOL/L (ref 3.7–5.3)
RBC # BLD: 4.43 M/UL (ref 3.95–5.11)
RBC # BLD: ABNORMAL 10*6/UL
SEG NEUTROPHILS: 61 % (ref 36–65)
SEGMENTED NEUTROPHILS ABSOLUTE COUNT: 5.51 K/UL (ref 1.5–8.1)
SODIUM BLD-SCNC: 142 MMOL/L (ref 135–144)
T3 FREE: 2.47 PG/ML (ref 2.02–4.43)
THYROXINE, FREE: 1.39 NG/DL (ref 0.93–1.7)
TOTAL IRON BINDING CAPACITY: 380 UG/DL (ref 250–450)
TOTAL PROTEIN: 7.6 G/DL (ref 6.4–8.3)
TSH SERPL DL<=0.05 MIU/L-ACNC: 2.24 MIU/L (ref 0.3–5)
UNSATURATED IRON BINDING CAPACITY: 351 UG/DL (ref 112–347)
VITAMIN B-12: >2000 PG/ML (ref 232–1245)
VITAMIN D 25-HYDROXY: 72 NG/ML (ref 30–100)
WBC # BLD: 8.8 K/UL (ref 3.5–11.3)
WBC # BLD: ABNORMAL 10*3/UL

## 2022-01-24 PROCEDURE — 83540 ASSAY OF IRON: CPT

## 2022-01-24 PROCEDURE — 85025 COMPLETE CBC W/AUTO DIFF WBC: CPT

## 2022-01-24 PROCEDURE — 84100 ASSAY OF PHOSPHORUS: CPT

## 2022-01-24 PROCEDURE — 84439 ASSAY OF FREE THYROXINE: CPT

## 2022-01-24 PROCEDURE — 84443 ASSAY THYROID STIM HORMONE: CPT

## 2022-01-24 PROCEDURE — 82746 ASSAY OF FOLIC ACID SERUM: CPT

## 2022-01-24 PROCEDURE — 83735 ASSAY OF MAGNESIUM: CPT

## 2022-01-24 PROCEDURE — 82728 ASSAY OF FERRITIN: CPT

## 2022-01-24 PROCEDURE — 80053 COMPREHEN METABOLIC PANEL: CPT

## 2022-01-24 PROCEDURE — 83550 IRON BINDING TEST: CPT

## 2022-01-24 PROCEDURE — 36415 COLL VENOUS BLD VENIPUNCTURE: CPT

## 2022-01-24 PROCEDURE — 82607 VITAMIN B-12: CPT

## 2022-01-24 PROCEDURE — 76536 US EXAM OF HEAD AND NECK: CPT

## 2022-01-24 PROCEDURE — 82306 VITAMIN D 25 HYDROXY: CPT

## 2022-01-24 PROCEDURE — 84481 FREE ASSAY (FT-3): CPT

## 2022-01-24 PROCEDURE — 84432 ASSAY OF THYROGLOBULIN: CPT

## 2022-01-24 PROCEDURE — 84425 ASSAY OF VITAMIN B-1: CPT

## 2022-01-24 PROCEDURE — 86800 THYROGLOBULIN ANTIBODY: CPT

## 2022-01-25 LAB — THYROGLOBULIN: <0.2 NG/ML (ref 0–63.4)

## 2022-01-26 LAB — THYROGLOBULIN AB: 39 IU/ML (ref 0–40)

## 2022-01-28 LAB — VITAMIN B1 WHOLE BLOOD: 214 NMOL/L (ref 70–180)

## 2022-03-04 DIAGNOSIS — G89.29 CHRONIC LOW BACK PAIN, UNSPECIFIED BACK PAIN LATERALITY, UNSPECIFIED WHETHER SCIATICA PRESENT: ICD-10-CM

## 2022-03-04 DIAGNOSIS — M54.50 CHRONIC LOW BACK PAIN, UNSPECIFIED BACK PAIN LATERALITY, UNSPECIFIED WHETHER SCIATICA PRESENT: ICD-10-CM

## 2022-03-07 ENCOUNTER — PATIENT MESSAGE (OUTPATIENT)
Dept: ORTHOPEDIC SURGERY | Age: 42
End: 2022-03-07

## 2022-03-07 DIAGNOSIS — M54.50 LOW BACK PAIN, UNSPECIFIED BACK PAIN LATERALITY, UNSPECIFIED CHRONICITY, UNSPECIFIED WHETHER SCIATICA PRESENT: ICD-10-CM

## 2022-03-07 DIAGNOSIS — G89.29 CHRONIC LOW BACK PAIN, UNSPECIFIED BACK PAIN LATERALITY, UNSPECIFIED WHETHER SCIATICA PRESENT: Primary | ICD-10-CM

## 2022-03-07 DIAGNOSIS — M54.50 CHRONIC LOW BACK PAIN, UNSPECIFIED BACK PAIN LATERALITY, UNSPECIFIED WHETHER SCIATICA PRESENT: Primary | ICD-10-CM

## 2022-03-07 RX ORDER — IBUPROFEN 800 MG/1
TABLET ORAL
Qty: 90 TABLET | Refills: 0 | Status: SHIPPED | OUTPATIENT
Start: 2022-03-07 | End: 2022-04-07

## 2022-03-08 DIAGNOSIS — M54.50 CHRONIC LOW BACK PAIN, UNSPECIFIED BACK PAIN LATERALITY, UNSPECIFIED WHETHER SCIATICA PRESENT: ICD-10-CM

## 2022-03-08 DIAGNOSIS — G89.29 CHRONIC LOW BACK PAIN, UNSPECIFIED BACK PAIN LATERALITY, UNSPECIFIED WHETHER SCIATICA PRESENT: ICD-10-CM

## 2022-03-08 RX ORDER — IBUPROFEN 800 MG/1
TABLET ORAL
Qty: 90 TABLET | Refills: 0 | OUTPATIENT
Start: 2022-03-08

## 2022-03-08 NOTE — TELEPHONE ENCOUNTER
From: Sneha Morales  To: Dr. Green [place]: 3/7/2022 10:24 PM EST  Subject: Referral for epidural    Just checking about a referral to have an epidural that dr Vernell Bagley talked to me about at my appointment in January with dr Daniel Valencia. I havent heard anything from them.    Thanks so much

## 2022-04-01 ENCOUNTER — HOSPITAL ENCOUNTER (OUTPATIENT)
Dept: MAMMOGRAPHY | Age: 42
Discharge: HOME | End: 2022-04-03
Payer: COMMERCIAL

## 2022-04-01 VITALS — HEIGHT: 65 IN | WEIGHT: 170 LBS | BODY MASS INDEX: 28.32 KG/M2

## 2022-04-01 DIAGNOSIS — Z12.31 BREAST CANCER SCREENING BY MAMMOGRAM: ICD-10-CM

## 2022-04-01 PROCEDURE — 77063 BREAST TOMOSYNTHESIS BI: CPT

## 2022-04-03 DIAGNOSIS — G89.29 CHRONIC LOW BACK PAIN, UNSPECIFIED BACK PAIN LATERALITY, UNSPECIFIED WHETHER SCIATICA PRESENT: ICD-10-CM

## 2022-04-03 DIAGNOSIS — M54.50 CHRONIC LOW BACK PAIN, UNSPECIFIED BACK PAIN LATERALITY, UNSPECIFIED WHETHER SCIATICA PRESENT: ICD-10-CM

## 2022-04-07 RX ORDER — IBUPROFEN 800 MG/1
TABLET ORAL
Qty: 90 TABLET | Refills: 0 | Status: SHIPPED | OUTPATIENT
Start: 2022-04-07 | End: 2022-05-06

## 2022-04-08 SDOH — HEALTH STABILITY: PHYSICAL HEALTH: ON AVERAGE, HOW MANY DAYS PER WEEK DO YOU ENGAGE IN MODERATE TO STRENUOUS EXERCISE (LIKE A BRISK WALK)?: 3 DAYS

## 2022-04-08 SDOH — HEALTH STABILITY: PHYSICAL HEALTH: ON AVERAGE, HOW MANY MINUTES DO YOU ENGAGE IN EXERCISE AT THIS LEVEL?: 30 MIN

## 2022-04-11 ENCOUNTER — OFFICE VISIT (OUTPATIENT)
Dept: FAMILY MEDICINE CLINIC | Age: 42
End: 2022-04-11
Payer: COMMERCIAL

## 2022-04-11 VITALS
HEIGHT: 65 IN | RESPIRATION RATE: 12 BRPM | TEMPERATURE: 98.4 F | WEIGHT: 179.8 LBS | BODY MASS INDEX: 29.96 KG/M2 | OXYGEN SATURATION: 99 % | SYSTOLIC BLOOD PRESSURE: 126 MMHG | DIASTOLIC BLOOD PRESSURE: 64 MMHG | HEART RATE: 67 BPM

## 2022-04-11 DIAGNOSIS — J45.20 MILD INTERMITTENT ASTHMA WITHOUT COMPLICATION: ICD-10-CM

## 2022-04-11 DIAGNOSIS — R76.8 IGG GLIADIN ANTIBODY POSITIVE: ICD-10-CM

## 2022-04-11 DIAGNOSIS — E55.9 VITAMIN D DEFICIENCY: ICD-10-CM

## 2022-04-11 DIAGNOSIS — R53.83 TIRED: ICD-10-CM

## 2022-04-11 DIAGNOSIS — Z15.89 HOMOZYGOUS MTHFR MUTATION C677T: ICD-10-CM

## 2022-04-11 DIAGNOSIS — Z83.49 FAMILY HISTORY OF MTHFR DEFICIENCY: ICD-10-CM

## 2022-04-11 DIAGNOSIS — R45.86 MOOD CHANGE: ICD-10-CM

## 2022-04-11 DIAGNOSIS — G44.041 INTRACTABLE CHRONIC PAROXYSMAL HEMICRANIA: ICD-10-CM

## 2022-04-11 DIAGNOSIS — R52 ACHES: Primary | ICD-10-CM

## 2022-04-11 DIAGNOSIS — R63.5 WEIGHT INCREASE: ICD-10-CM

## 2022-04-11 DIAGNOSIS — G47.9 SLEEP DIFFICULTIES: ICD-10-CM

## 2022-04-11 PROCEDURE — G8427 DOCREV CUR MEDS BY ELIG CLIN: HCPCS | Performed by: FAMILY MEDICINE

## 2022-04-11 PROCEDURE — 1036F TOBACCO NON-USER: CPT | Performed by: FAMILY MEDICINE

## 2022-04-11 PROCEDURE — G8419 CALC BMI OUT NRM PARAM NOF/U: HCPCS | Performed by: FAMILY MEDICINE

## 2022-04-11 PROCEDURE — 99203 OFFICE O/P NEW LOW 30 MIN: CPT | Performed by: FAMILY MEDICINE

## 2022-04-11 RX ORDER — INSULIN DETEMIR 100 [IU]/ML
INJECTION, SOLUTION SUBCUTANEOUS
COMMUNITY
Start: 2022-02-21

## 2022-04-11 RX ORDER — INSULIN LISPRO 100 [IU]/ML
INJECTION, SOLUTION INTRAVENOUS; SUBCUTANEOUS
COMMUNITY
Start: 2022-03-21

## 2022-04-11 RX ORDER — PEN NEEDLE, DIABETIC 32GX 5/32"
NEEDLE, DISPOSABLE MISCELLANEOUS
COMMUNITY
Start: 2022-03-25

## 2022-04-11 SDOH — ECONOMIC STABILITY: FOOD INSECURITY: WITHIN THE PAST 12 MONTHS, THE FOOD YOU BOUGHT JUST DIDN'T LAST AND YOU DIDN'T HAVE MONEY TO GET MORE.: NEVER TRUE

## 2022-04-11 SDOH — ECONOMIC STABILITY: FOOD INSECURITY: WITHIN THE PAST 12 MONTHS, YOU WORRIED THAT YOUR FOOD WOULD RUN OUT BEFORE YOU GOT MONEY TO BUY MORE.: NEVER TRUE

## 2022-04-11 ASSESSMENT — PATIENT HEALTH QUESTIONNAIRE - PHQ9
1. LITTLE INTEREST OR PLEASURE IN DOING THINGS: 0
SUM OF ALL RESPONSES TO PHQ QUESTIONS 1-9: 0
SUM OF ALL RESPONSES TO PHQ QUESTIONS 1-9: 0
SUM OF ALL RESPONSES TO PHQ9 QUESTIONS 1 & 2: 0
2. FEELING DOWN, DEPRESSED OR HOPELESS: 0
SUM OF ALL RESPONSES TO PHQ QUESTIONS 1-9: 0
SUM OF ALL RESPONSES TO PHQ QUESTIONS 1-9: 0

## 2022-04-11 ASSESSMENT — ENCOUNTER SYMPTOMS
RESPIRATORY NEGATIVE: 1
BACK PAIN: 1
SINUS PAIN: 1
CONSTIPATION: 1
ABDOMINAL DISTENTION: 1
ABDOMINAL PAIN: 1
EYES NEGATIVE: 1

## 2022-04-11 ASSESSMENT — SOCIAL DETERMINANTS OF HEALTH (SDOH): HOW HARD IS IT FOR YOU TO PAY FOR THE VERY BASICS LIKE FOOD, HOUSING, MEDICAL CARE, AND HEATING?: NOT VERY HARD

## 2022-04-11 NOTE — PROGRESS NOTES
[id number] [facility] Beatrice KIM [address] [address]  Dept: [phone]  Dept Fax: [phone]  Loc: Nataly Morales is a 42 y.o. White female. Levonne Holiday  presents to the [place] [address] clinic today for   Chief Complaint   Patient presents with    New Patient     WEE PROTOCOL   , and;   1. Aches    2. Mild intermittent asthma without complication    3. Family history of MTHFR deficiency    4. Intractable chronic paroxysmal hemicrania    5. Homozygous MTHFR mutation C677T    6. IgG Gliadin antibody positive    7. Mood change    8. Sleep difficulties    9. Tired    10. Vitamin D deficiency    11. Weight increase          I have reviewed Sneha Morales medical, surgical and other pertinent history in detail, and have updated medication and allergy information in the computerized patient record. Clinical Care Team:     -Referring Provider for today's consult: self  -Primary Care Provider: Self Referral    Medical/Surgical History:   She  has a past medical history of Asthma, Cancer ([facility] Controlled type 2 diabetes mellitus without complication, without long-term current use of insulin ([facility] and Thyroid cancer ([facility] Her  has a past surgical history that includes  section; Total Thyroidectomy (Bilateral, 2017); Nerve Block (Right, 2021); Pain management procedure (Right, 2021); Pain management procedure (2021); Pain management procedure (Right, 2021); Pain management procedure (Right, 2021); and Pain management procedure (Right, 2021).     Family/Social History:     Her family history includes Arrhythmia in her father; Arthritis in her maternal grandfather and maternal grandmother; Cancer in her maternal grandfather, maternal grandmother, and paternal grandfather; Diabetes in her father; Heart Attack in her father; Heart Disease in her father; Heart Failure in her maternal grandmother; High Blood Pressure in her paternal grandmother; Neuropathy in her maternal grandmother; Other in her mother; Parkinsonism in her maternal grandfather; Prostate Cancer in her paternal grandfather; Stroke in her father. She  reports that she has never smoked. She has never used smokeless tobacco. She reports that she does not drink alcohol and does not use drugs.     Medications/Allergies/Immunizations:     Her current medication(s) include   Current Outpatient Medications:     LEVEMIR FLEXTOUCH 100 UNIT/ML injection pen, INJECT 25 UNITS SUBCUTANEOUSLY IN THE MORNING, Disp: , Rfl:     HUMALOG KWIKPEN 100 UNIT/ML SOPN, , Disp: , Rfl:     TRUEPLUS PEN NEEDLES 32G X 4 MM MISC, USE THREE TIMES A DAY, Disp: , Rfl:     ibuprofen (ADVIL;MOTRIN) 800 MG tablet, TAKE 1 TABLET BY MOUTH EVERY EIGHT HOURS AS NEEDED FOR PAIN, Disp: 90 tablet, Rfl: 0    triamcinolone (ARISTOCORT) 0.5 % cream, APPLY A THIN LAYER TO THE AFFECTED AREA TWICE DAILY, Disp: , Rfl:     nystatin (MYCOSTATIN) 494372 UNIT/GM powder, APPLY TO THE AFFECTED AREA TWICE DAILY AS NEEDED, Disp: , Rfl:     gabapentin (NEURONTIN) 300 MG capsule, , Disp: , Rfl:     FEROSUL 325 (65 Fe) MG tablet, TAKE 1 TABLET BY MOUTH TWO TIMES A DAY, Disp: , Rfl:     ISIBLOOM 0.15-30 MG-MCG per tablet, TAKE 1 TABLET BY MOUTH EVERY DAY CONTINUOUSLY, Disp: , Rfl:     levothyroxine (SYNTHROID) 125 MCG tablet, Take 125 mcg by mouth daily , Disp: , Rfl:     omeprazole (PRILOSEC) 20 MG delayed release capsule, TAKE 1 CAPSULE BY MOUTH EVERY DAY, Disp: , Rfl:     glipiZIDE (GLUCOTROL) 10 MG tablet, , Disp: , Rfl:     metFORMIN (GLUCOPHAGE) 500 MG tablet, Take 500 mg by mouth 3 times daily, Disp: , Rfl:     liothyronine (CYTOMEL) 5 MCG tablet, Take 5 mcg by mouth 2 times daily, Disp: , Rfl:     Cetirizine HCl (ZYRTEC ALLERGY PO), Take by mouth, Disp: , Rfl:     B Complex-Folic Acid (I-684 BALANCED TR PO), Take 1 tablet by mouth 3 times daily (before meals), Disp: , Rfl:     Calcium-Magnesium-Vitamin D [phone] MG-MG-UNIT TABS, Take 1 tablet by mouth 3 times daily (before meals), Disp: , Rfl:   Allergies: Patient has no known allergies. Immunizations:   Immunization History   Administered Date(s) Administered    COVID-19, Erica Rim, Primary or Immunocompromised, PF, 100mcg/0.5mL 03/31/2021, 04/28/2021    Pneumococcal Polysaccharide (Iwewglyph59) 07/08/2014    Tdap (Boostrix, Adacel) 07/03/2014        History of Present Illness:     Sneha's had concerns including New Patient (WEE PROTOCOL). Bailey Perez  presents to the [address] [place] [address] today for;   Chief Complaint   Patient presents with    New Patient     WEE PROTOCOL   , abnormal labs follow up and these conditions as she  Is looking today for:     1. Aches    2. Mild intermittent asthma without complication    3. Family history of MTHFR deficiency    4. Intractable chronic paroxysmal hemicrania    5. Homozygous MTHFR mutation C677T    6. IgG Gliadin antibody positive    7. Mood change    8. Sleep difficulties    9. Tired    10. Vitamin D deficiency    11. Weight increase      HPI    Subjective:     Review of Systems   Constitutional: Positive for fatigue and unexpected weight change. HENT: Positive for congestion and sinus pain. Eyes: Negative. Respiratory: Negative. Cardiovascular: Negative. Gastrointestinal: Positive for abdominal distention, abdominal pain and constipation. Endocrine: Negative. Genitourinary: Positive for menstrual problem. Musculoskeletal: Positive for arthralgias, back pain, gait problem, joint swelling, myalgias and neck pain. Skin:        Hair and nail   Allergic/Immunologic: Positive for environmental allergies. Neurological: Positive for headaches. Hematological: Negative. Psychiatric/Behavioral: Positive for decreased concentration, dysphoric mood and sleep disturbance. The patient is nervous/anxious.     All other systems reviewed and are negative. Objective:     /64 (Site: Right Upper Arm, Position: Sitting, Cuff Size: Medium Adult)   Pulse 67   Temp 98.4 °F (36.9 °C) (Oral)   Resp 12   Ht 5' 5\" (1.651 m)   Wt 179 lb 12.8 oz (81.6 kg)   LMP 03/16/2022   SpO2 99%   BMI 29.92 kg/m²   Physical Exam  Vitals and nursing note reviewed. Constitutional:       Appearance: Normal appearance. HENT:      Head: Normocephalic. Pulmonary:      Effort: Pulmonary effort is normal.   Neurological:      Mental Status: She is alert. Psychiatric:         Mood and Affect: Mood normal.         Thought Content: Thought content normal.            Laboratory Data:   Lab results were searched in Care Everywhere and/or those brought by the pateint were reviewed today with Sneha and she has a copy of their most recent labs to take home with them as noted below;       Imaging Data:   Imaging Data:       Assessment & Plan:       Impression:  1. Aches    2. Mild intermittent asthma without complication    3. Family history of MTHFR deficiency    4. Intractable chronic paroxysmal hemicrania    5. Homozygous MTHFR mutation C677T    6. IgG Gliadin antibody positive    7. Mood change    8. Sleep difficulties    9. Tired    10. Vitamin D deficiency    11. Weight increase      Assessment and Plan:  After reviewing the patients chief complaints, reviewing their labfindings in great detail (with the patient and those accompanying them) which correlate to their chief complaints, symptoms, and or medical conditions; suggestions were made relating to changes in diet and or supplements which may improve the complaints and which will be reflected in their future lab findings; Chief Complaint   Patient presents with    New Patient     WEE PROTOCOL   ;    Plans for the next visits:  - Abnormal and non-optimal Labs were ordered today to be repeated in the next 120-365 days to assess changes from adjustments in nutrition and or nutrients.    - Patient instructed when reviewed and given to the patient today    - 23 Foods containing Latex-like proteins was reviewed and copy to be taken if desired     - Nutrient Supplements list provided and copyto be taken if desired    - [employer]. Copious web site offered to patient to review at their convenience by staff with login information    Note:  I have discussed with the patient that with all nutraceuticals, there is often mixed data and emerging research which needs to be monitored; as well as an array of NIH fact sheets on nutrients and supplements, available at www.nih,issue plus Canonical. Copious plus www.Geofeedia,org. If I have recommended cinnamon at the request of this patient to assist them in control of their blood sugar, triglyceride, and/or weight issues. I discussed that the patient's clinical use of cinnamon bark, calcium, magnesium, Vitamin D, and pharmaceutical grade [employer] omega 3 oil or triple-strength fish oil, and B-50/B-100 time-released B-complex by [id number] [facility] will be for a time-limited trial to determine their individual effectiveness and safety in this patient. I also referred the patient to the NMCD: Nutrition, Metabolism, and Cardiovascular Diseases (SecuritiesCard.pl) and concerns about long-term use and hepatotoxicity of cinnamon and other nutrients. I suggested they frequently search nih.gov for the latest non-proprietary information on nutriceuticals as well as consider a subscription to SHERPA assistant for details on reviewed supplements, or at the least review the nutrient files at [facility] at [facility], [address] bark, an insulin mimetic, reduces some High Carbohydrate Dietary Impacts. Methylhydroxychalcone polymers insulin-enhancing properties in fat cells are responsible for enhanced glucose uptake, inhibiting hepatic HMG-CoA reductase and lowers lipids. www.jacn. org/content/20/4/327.full     But cinnamon with additives such as Cinnamon Extract are not effective as insulin mimetics. :eStoreDirectory.at     Nutrients for Start up from [employer] or [employer] for ease to get started now;  Berta Mendosa has some useable products;  - Triple Strength Fish Oil, enteric coated  - Vit D-3 5000 IU gel caps  - Iron ferrous sulfate 325 mg tabs  - Centrum Silver look-a-like for most patients, or  - Centrum plain look-a-like if need iron    Local pharmacies or chains such as [employer], have:  - OpTier pharmaceutical grade omega 3 is 90% EPA/DHA whereas most Triple strength fish oil are 75% EPA/DHA  - Triple Strength Fish Oil (enteric coated if available) or if not enteric coated, can take from freezer for less burps  - B-50 or B-100 released balanced B complex tabs by [id number] [facility] at [facility] bark 500 mg (without Chromium or extracts)   some brands list 1000 mg / serving of 2 capsules,    some brands have 1000 mg caps with the undesireable chromium extract  - Calcium carbonate/citrate, magnesium oxide/citrate, Vit D-3 as 3-4 tabs/caps/serving     Some Local Brands may contain Zinc which is acceptable for the first bottle or two  - Magnesium oxide 250 mg tabs for those having < 2 bowel movements daily  - Magnesium citrate 200 mg if having > 2 bowel movements/day  - Centrum Silver or look-a-like for most patients, Centrum plain or look-a-like with iron  - Vitamin D-3 comes as 1,000 IU or 2,000 IU or 5,000 IU gel caps or Liquid drops but keep Vitamin D levels <50 but >40     Some brands containing or derived from soy oil or corn oil are OK if not allergic to soy  - Elemental Iron 65 mg tabs at bedtime is available over the counter if need more iron     Usually turns bowel movements grey, green, or black but not a concern  - Apricot Kernel Oil (by Now) for dry skin sensitive perineal or perianal area skin    Nutrients for ongoing use by Mail order for less expense from www. Wickr ;  - Strength Fish Oil Constipation    Diarrhea  Nocturia (up to bathroom at night)    Fatigue/Energy level  Stress      On the other side of the sheet they can track their food, drink, environment, activity, symptoms etc      Avoiding Latex-like proteins in my foods; Avocados, Bananas, Celery, Figs & Kiwi proteins have latex-like proteins to inflame our immune systems, plus 47 more foods  How Can I Have A Latex Allergy? Eating foods with latex-like protein exposes us to latex allergies. Our body cannot tell the differencebetween these latex-like proteins and latex from rubber products since many people are allergic to fruit, vegetables and latex. Read labels on pre-packaged foods. This list to avoid is only a guide if you are known allergicto latex or have a latex rash on your chin, cheeks and lines on your neck and chest. The amount of latex is different in each food product or fruit variety. Avoid out of Season if not grown locally:   Melon, Nectarine, Papaya, Cherry, Passion fruit, Plum, Chestnuts, and Tomato. Avocado, Banana, Celery, Figs, and Kiwi always contain Latex-like protein. Whats in Season? Strawberries taste better in June than December because June is strawberry season so buy locally grown produce \"in season\" for the best flavor, cost, and less Latex. Locally grown produce not only tastes great but also requires little or no ethylene exposure in food distribution so has less latex content. Out of season: use canned, frozen, or dried since those are processed ripe and latex content is lower!!!     Month     [place] Locally Grown Produce  January, February, March: use canned, frozen or dried fruits since lower in latex  April: asparagus, radishes  May: asparagus, broccoli, green onions, greens, peas, radishes, rhubarb  [place]: asparagus, beets, beans, broccoli, cabbage, cantaloupe, carrots, green onions, greens, lettuce, onions, parsley, peas, radishes, rhubarb, strawberries, watermelons  July: beans, beets, blueberries, broccoli, cabbage, cantaloupe, carrots, cauliflower, celery, cucumbers, eggplant, grapes, green onions, greens, lettuce, onions, parsley, peas, peaches, bell peppers, potatoes, radishes, summer raspberries, squash, sweetcorn, tomatoes, turnips, watermelons  August: apples, beans, beets, blueberries, cabbage, cantaloupe, carrots, cauliflower, celery, cucumbers, eggplant, grapes, green onions, greens, lettuce, onions, parsley, peas, peaches, pears, bell peppers, potatoes, radishes, squash, sweet corn, tomatoes, turnips, watermelons  September: apples, beans, beets, blueberries, cabbage, cantaloupe, carrots, cauliflower, celery, cucumbers, eggplant, grapes, green onions, greens, lettuce, onions, parsley, peas, peaches, pears, bell peppers, plums, potatoes, pumpkins, radishes, fall red raspberries, squash, sweet corn, tomatoes, turnips, watermelons  October: apples, beets, broccoli, cabbage, carrots, cauliflower, celery, green onions, greens, lettuce, parsley, peas, pears, potatoes, pumpkins, radishes, fall red raspberries, squash, turnips  November: broccoli, cabbage, carrots, parsley, pears, peas  December: use canned, frozen or dried fruits since lower in latex    Upto half of latex-sensitive patients show allergic reactions to fruits (avocados, bananas, kiwifruits, papayas, peaches),   Annals of Allergy, 1994. These plants contain the same proteins that are allergens in latex. People with fruit allergies should warn physicians before undergoing procedures which may cause anaphylactic reaction if in contact with latex gloves. Some of the common foods with defined cross-reactivity to latex are avocado, banana, kiwi, chestnut, raw potato, tomato, stone fruits (e.g., peach, cherry), hazelnut, melons, celery, carrot, apple, pear, papaya, and almond.   Foods with less well-defined cross-reactivity to latex are peanuts, peppers, citrus fruits, coconut, pineapple, norma, fig, passion fruit, Ugli fruit, and grape.    This fruit/latex cross-reactivity is worsened by ethylene, a gas used to hasten commercial ripening. In nature, plants produce low levels of the hormone ethylene, which regulates germination, flowering, and ripening. Forced ripening by high ethylene concentrations, plants produce allergenic wound-repair proteins, which are similar to wound-repair proteins made during the tapping of rubber trees. Sensitive individuals who ingest the fruit get a higher dose and worse reaction. Some people may even first become sensitized to latex through fruit. Can food processing increase the concentrations of allergenic proteins? Latex-sensitized children (and adults) in [place] often experience allergic reactions after eating bananas ripened artificially with ethylene. In the [place], food distribution centers treat unripe bananas and other produce with ethylene to ripen; not commonly done in [facility] since fruit is tree-ripened there. Does treatment of food with ethylene induce banana proteins that cross-react with latex? (Nael et al.)    References:   Latex in Foods Allergy, http://ehp.niehs.nih.gov/members/2003/5811/5811.html    Search web for [employer] in Season \" for where you live or are at the time you food shop   Management of Latex, ://medicalcenter. o[employer].edu/  search for nih, latex-like proteins in foods

## 2022-04-11 NOTE — PATIENT INSTRUCTIONS
Thank you   1. Thank you for trusting us with your healthcare needs. You may receive a survey regarding today's visit. It would help us out if you would take a few moments to provide your feedback. We value your input. 2. Please bring in ALL medications BOTTLES, including inhalers, herbal supplements, over the counter, prescribed & non-prescribed medicine. The office would like actual medication bottles and a list.   3. Please note our OFFICE POLICIES:   a. Prior to getting your labs drawn, please check with your insurance company for benefits and eligibility of lab services. Often, insurance companies cover certain tests for preventative visits only. It is patient's responsibility to see what is covered. b. We are unable to change a diagnosis after the test has been performed. c. Lab orders will not be re-printed. Please hold onto your original lab orders and take them to your lab to be completed. d. If you no show your scheduled appointment three times, you will be dismissed from this practice. e. Janet Bernheim must be completed 24 hours prior to your schedule appointment. 4. If the list below has been completed, PLEASE FAX RECORDS TO OUR OFFICE @ [phone].  Once the records have been received we will update your records at our office:  Health Maintenance Due   Topic Date Due    Hepatitis C screen  Never done    Depression Screen  Never done    HIV screen  Never done    Cervical cancer screen  Never done    COVID-19 Vaccine (3 - Booster for Moderna series) 09/28/2021    A1C test (Diabetic or Prediabetic)  10/28/2021

## 2022-05-05 DIAGNOSIS — G89.29 CHRONIC LOW BACK PAIN, UNSPECIFIED BACK PAIN LATERALITY, UNSPECIFIED WHETHER SCIATICA PRESENT: ICD-10-CM

## 2022-05-05 DIAGNOSIS — M54.50 CHRONIC LOW BACK PAIN, UNSPECIFIED BACK PAIN LATERALITY, UNSPECIFIED WHETHER SCIATICA PRESENT: ICD-10-CM

## 2022-05-06 DIAGNOSIS — M54.50 CHRONIC LOW BACK PAIN, UNSPECIFIED BACK PAIN LATERALITY, UNSPECIFIED WHETHER SCIATICA PRESENT: ICD-10-CM

## 2022-05-06 DIAGNOSIS — G89.29 CHRONIC LOW BACK PAIN, UNSPECIFIED BACK PAIN LATERALITY, UNSPECIFIED WHETHER SCIATICA PRESENT: ICD-10-CM

## 2022-05-06 RX ORDER — IBUPROFEN 800 MG/1
TABLET ORAL
Qty: 90 TABLET | Refills: 0 | Status: SHIPPED | OUTPATIENT
Start: 2022-05-06 | End: 2022-05-09

## 2022-05-07 DIAGNOSIS — G89.29 CHRONIC LOW BACK PAIN, UNSPECIFIED BACK PAIN LATERALITY, UNSPECIFIED WHETHER SCIATICA PRESENT: ICD-10-CM

## 2022-05-07 DIAGNOSIS — M54.50 CHRONIC LOW BACK PAIN, UNSPECIFIED BACK PAIN LATERALITY, UNSPECIFIED WHETHER SCIATICA PRESENT: ICD-10-CM

## 2022-05-09 RX ORDER — IBUPROFEN 800 MG/1
TABLET ORAL
Qty: 90 TABLET | Refills: 0 | OUTPATIENT
Start: 2022-05-09

## 2022-05-09 RX ORDER — IBUPROFEN 800 MG/1
TABLET ORAL
Qty: 90 TABLET | Refills: 0 | Status: SHIPPED | OUTPATIENT
Start: 2022-05-09 | End: 2022-06-09

## 2022-05-11 ENCOUNTER — HOSPITAL ENCOUNTER (OUTPATIENT)
Age: 42
Setting detail: SPECIMEN
Discharge: HOME | End: 2022-05-11

## 2022-05-11 DIAGNOSIS — R45.86 MOOD CHANGE: ICD-10-CM

## 2022-05-11 DIAGNOSIS — R76.8 IGG GLIADIN ANTIBODY POSITIVE: ICD-10-CM

## 2022-05-11 DIAGNOSIS — Z15.89 HOMOZYGOUS MTHFR MUTATION C677T: ICD-10-CM

## 2022-05-11 DIAGNOSIS — J45.20 MILD INTERMITTENT ASTHMA WITHOUT COMPLICATION: ICD-10-CM

## 2022-05-11 DIAGNOSIS — Z83.49 FAMILY HISTORY OF MTHFR DEFICIENCY: ICD-10-CM

## 2022-05-11 DIAGNOSIS — G44.041 INTRACTABLE CHRONIC PAROXYSMAL HEMICRANIA: ICD-10-CM

## 2022-05-11 DIAGNOSIS — E55.9 VITAMIN D DEFICIENCY: ICD-10-CM

## 2022-05-11 DIAGNOSIS — R53.83 TIRED: ICD-10-CM

## 2022-05-11 DIAGNOSIS — R63.5 WEIGHT INCREASE: ICD-10-CM

## 2022-05-11 DIAGNOSIS — G47.9 SLEEP DIFFICULTIES: ICD-10-CM

## 2022-05-11 DIAGNOSIS — R52 ACHES: ICD-10-CM

## 2022-05-11 LAB
ABSOLUTE EOS #: 0.11 K/UL (ref 0–0.44)
ABSOLUTE IMMATURE GRANULOCYTE: 0.03 K/UL (ref 0–0.3)
ABSOLUTE LYMPH #: 2.36 K/UL (ref 1.1–3.7)
ABSOLUTE MONO #: 0.6 K/UL (ref 0.1–1.2)
AMYLASE: 55 U/L (ref 28–100)
BASOPHILS # BLD: 1 % (ref 0–2)
BASOPHILS ABSOLUTE: 0.05 K/UL (ref 0–0.2)
CALCIUM SERPL-MCNC: 9 MG/DL (ref 8.6–10.4)
CHOLESTEROL/HDL RATIO: 2.2
CHOLESTEROL: 124 MG/DL
EOSINOPHILS RELATIVE PERCENT: 1 % (ref 1–4)
ESTRADIOL LEVEL: 114.5 PG/ML (ref 27–314)
FOLLICLE STIMULATING HORMONE: 3.2 MIU/ML (ref 1.7–21.5)
HCT VFR BLD CALC: 35.2 % (ref 36.3–47.1)
HDLC SERPL-MCNC: 56 MG/DL
HEMOGLOBIN: 11.2 G/DL (ref 11.9–15.1)
HIGH SENSITIVE C-REACTIVE PROTEIN: 5.4 MG/L
HOMOCYSTEINE: 6 UMOL/L
IGE: 56 IU/ML
IMMATURE GRANULOCYTES: 0 %
IRON SATURATION: 14 % (ref 20–55)
IRON: 50 UG/DL (ref 37–145)
LDL CHOLESTEROL: 61 MG/DL (ref 0–130)
LH: 2.5 MIU/ML (ref 1–95.6)
LIPASE: 56 U/L (ref 13–60)
LYMPHOCYTES # BLD: 27 % (ref 24–43)
MAGNESIUM: 1.9 MG/DL (ref 1.6–2.6)
MCH RBC QN AUTO: 27.9 PG (ref 25.2–33.5)
MCHC RBC AUTO-ENTMCNC: 31.8 G/DL (ref 28.4–34.8)
MCV RBC AUTO: 87.8 FL (ref 82.6–102.9)
MONOCYTES # BLD: 7 % (ref 3–12)
NRBC AUTOMATED: 0 PER 100 WBC
PDW BLD-RTO: 14.3 % (ref 11.8–14.4)
PLATELET # BLD: 315 K/UL (ref 138–453)
PMV BLD AUTO: 10.8 FL (ref 8.1–13.5)
PROGESTERONE LEVEL: 3.77 NG/ML
PTH INTACT: 40.2 PG/ML (ref 15–65)
RBC # BLD: 4.01 M/UL (ref 3.95–5.11)
RHEUMATOID FACTOR: <10 IU/ML
SEDIMENTATION RATE, ERYTHROCYTE: 23 MM/HR (ref 0–20)
SEG NEUTROPHILS: 64 % (ref 36–65)
SEGMENTED NEUTROPHILS ABSOLUTE COUNT: 5.62 K/UL (ref 1.5–8.1)
SEX HORMONE BINDING GLOBULIN: 136 NMOL/L (ref 30–135)
T3 FREE: 3.66 PG/ML (ref 2.02–4.43)
T3 TOTAL: 108 NG/DL (ref 60–181)
TESTOSTERONE FREE-NONMALE: <1 PG/ML (ref 1.1–5.8)
TESTOSTERONE TOTAL: 11 NG/DL (ref 20–70)
THYROXINE, FREE: 1.44 NG/DL (ref 0.93–1.7)
TOTAL IRON BINDING CAPACITY: 363 UG/DL (ref 250–450)
TRIGL SERPL-MCNC: 34 MG/DL
TSH SERPL DL<=0.05 MIU/L-ACNC: 0.48 UIU/ML (ref 0.3–5)
UNSATURATED IRON BINDING CAPACITY: 313 UG/DL (ref 112–347)
URIC ACID: 2.7 MG/DL (ref 2.4–5.7)
VITAMIN D 25-HYDROXY: 44.7 NG/ML
WBC # BLD: 8.8 K/UL (ref 3.5–11.3)

## 2022-05-12 LAB
DHEAS (DHEA SULFATE): 55 UG/DL (ref 32–240)
ESTIMATED AVERAGE GLUCOSE: 148 MG/DL
HBA1C MFR BLD: 6.8 % (ref 4–6)
T4 TOTAL: 7.9 UG/DL (ref 4.5–10.9)

## 2022-05-14 LAB
ANTI DNA DOUBLE STRANDED: 0.6 IU/ML
ANTI-NUCLEAR ANTIBODY (ANA): NEGATIVE
ENA ANTIBODIES SCREEN: 0.1 U/ML
GLIADIN DEAMINIDATED PEPTIDE AB IGG: 0.4 U/ML
THYROGLOBULIN AB: 32 IU/ML (ref 0–40)
THYROGLOBULIN: <0.2 NG/ML (ref 0–63.4)
THYROID PEROXIDASE (TPO) AB: 6.9 IU/ML (ref 0–25)
TISSUE TRANSGLUTAMINASE ANTIBODY IGG: <0.6 U/ML
TISSUE TRANSGLUTAMINASE IGA: 0.3 U/ML

## 2022-05-22 LAB — DHEA: 1.19 NG/ML (ref 0.63–4.7)

## 2022-05-23 ENCOUNTER — OFFICE VISIT (OUTPATIENT)
Dept: PAIN MANAGEMENT | Age: 42
End: 2022-05-23
Payer: COMMERCIAL

## 2022-05-23 VITALS — HEIGHT: 65 IN | BODY MASS INDEX: 27.39 KG/M2 | WEIGHT: 164.4 LBS

## 2022-05-23 DIAGNOSIS — M47.817 LUMBOSACRAL SPONDYLOSIS WITHOUT MYELOPATHY: ICD-10-CM

## 2022-05-23 DIAGNOSIS — M54.17 LUMBOSACRAL NEURITIS: Primary | ICD-10-CM

## 2022-05-23 PROCEDURE — G8419 CALC BMI OUT NRM PARAM NOF/U: HCPCS | Performed by: PAIN MEDICINE

## 2022-05-23 PROCEDURE — 99214 OFFICE O/P EST MOD 30 MIN: CPT | Performed by: PAIN MEDICINE

## 2022-05-23 PROCEDURE — 1036F TOBACCO NON-USER: CPT | Performed by: PAIN MEDICINE

## 2022-05-23 PROCEDURE — G8427 DOCREV CUR MEDS BY ELIG CLIN: HCPCS | Performed by: PAIN MEDICINE

## 2022-05-23 ASSESSMENT — ENCOUNTER SYMPTOMS
BOWEL INCONTINENCE: 0
BACK PAIN: 1

## 2022-05-23 NOTE — PROGRESS NOTES
HPI:     Back Pain  This is a chronic problem. The current episode started more than 1 year ago. The problem occurs constantly. The problem has been gradually worsening since onset. The pain is present in the sacro-iliac. The pain radiates to the right knee, right thigh and right foot. The pain is at a severity of 6/10. The pain is moderate. The pain is worse during the night. The symptoms are aggravated by lying down, coughing, bending, sitting and twisting. Stiffness is present all day. Associated symptoms include leg pain. Pertinent negatives include no bladder incontinence, bowel incontinence, headaches or numbness. (CRAMPING DOWN THE RIGHT LEG ) Risk factors include history of cancer (THYROID ). She has tried ice, heat, bed rest, home exercises and NSAIDs for the symptoms. The treatment provided mild relief. Pain in the low back down the right leg. I saw her last year which point she had a series of epidurals followed by radiofrequency ablation with excellent benefit. More than 80% pain relief for more than 6 months. Pain has started to return. He has seen orthopedic spine surgeon. Recommended repeat injections. X-ray with degenerative changes. Physical therapy in the past with minimal benefit. Patient denies any new neurological symptoms. Nobowel or bladder incontinence, no weakness, and no falling. Review of OARRS does not show any aberrant prescription behavior.      Past Medical History:   Diagnosis Date    Asthma     Cancer [facility])     Thyroid    Controlled type 2 diabetes mellitus without complication, without long-term current use of insulin (HCC)     Thyroid cancer [facility])        Past Surgical History:   Procedure Laterality Date     SECTION      NERVE BLOCK Right 2021    : LUMBAR TRANSFORAMINAL L4-5 (Right )    PAIN MANAGEMENT PROCEDURE Right 2021    LUMBAR TRANSFORAMINAL L4-5 performed by Zachary Bowens MD at [address] [place] [address] 05/21/2021    LUMBAR TRANSFORAMINAL L4/5 - Right    PAIN MANAGEMENT PROCEDURE Right 5/21/2021    LUMBAR TRANSFORAMINAL L4/5 performed by Wale Gloria MD at [address] [place] [address] Right 07/23/2021    RIGHT L4/5 LUMBAR TRANSFORAMINAL - Right    PAIN MANAGEMENT PROCEDURE Right 7/23/2021    RIGHT L4/5 LUMBAR TRANSFORAMINAL performed by Wale Gloria MD at [facility] 2017       No Known Allergies      Current Outpatient Medications:     ibuprofen (ADVIL;MOTRIN) 800 MG tablet, TAKE 1 TABLET BY MOUTH EVERY EIGHT HOURS AS NEEDED FOR PAIN, Disp: 90 tablet, Rfl: 0    LEVEMIR FLEXTOUCH 100 UNIT/ML injection pen, INJECT 25 UNITS SUBCUTANEOUSLY IN THE MORNING, Disp: , Rfl:     HUMALOG KWIKPEN 100 UNIT/ML SOPN, , Disp: , Rfl:     TRUEPLUS PEN NEEDLES 32G X 4 MM MISC, USE THREE TIMES A DAY, Disp: , Rfl:     triamcinolone (ARISTOCORT) 0.5 % cream, APPLY A THIN LAYER TO THE AFFECTED AREA TWICE DAILY, Disp: , Rfl:     nystatin (MYCOSTATIN) 345370 UNIT/GM powder, APPLY TO THE AFFECTED AREA TWICE DAILY AS NEEDED, Disp: , Rfl:     gabapentin (NEURONTIN) 300 MG capsule, , Disp: , Rfl:     FEROSUL 325 (65 Fe) MG tablet, TAKE 1 TABLET BY MOUTH TWO TIMES A DAY, Disp: , Rfl:     ISIBLOOM 0.15-30 MG-MCG per tablet, TAKE 1 TABLET BY MOUTH EVERY DAY CONTINUOUSLY, Disp: , Rfl:     levothyroxine (SYNTHROID) 125 MCG tablet, Take 125 mcg by mouth daily , Disp: , Rfl:     omeprazole (PRILOSEC) 20 MG delayed release capsule, TAKE 1 CAPSULE BY MOUTH EVERY DAY, Disp: , Rfl:     glipiZIDE (GLUCOTROL) 10 MG tablet, , Disp: , Rfl:     metFORMIN (GLUCOPHAGE) 500 MG tablet, Take 500 mg by mouth 3 times daily, Disp: , Rfl:     liothyronine (CYTOMEL) 5 MCG tablet, Take 5 mcg by mouth 2 times daily, Disp: , Rfl:     Cetirizine HCl (ZYRTEC ALLERGY PO), Take by mouth, Disp: , Rfl:     B Complex-Folic Acid (M-659 BALANCED TR PO), Take 1 tablet by mouth 3 times daily (before meals), Disp: , Rfl:     Calcium-Magnesium-Vitamin D [phone] MG-MG-UNIT TABS, Take 1 tablet by mouth 3 times daily (before meals), Disp: , Rfl:     Family History   Problem Relation Age of Onset    Other Mother         MTHFR Mutation    Diabetes Father         IDDM    Stroke Father     Heart Disease Father     Heart Attack Father     Arrhythmia Father     Arthritis Maternal Grandmother     Cancer Maternal Grandmother     Heart Failure Maternal Grandmother     Neuropathy Maternal Grandmother     Parkinsonism Maternal Grandfather     Arthritis Maternal Grandfather     Cancer Maternal Grandfather     High Blood Pressure Paternal Grandmother     Prostate Cancer Paternal Grandfather     Cancer Paternal Grandfather        Social History     Socioeconomic History    Marital status: Single     Spouse name: Not on file    Number of children: Not on file    Years of education: Not on file    Highest education level: Not on file   Occupational History    Not on file   Tobacco Use    Smoking status: Never Smoker    Smokeless tobacco: Never Used   Vaping Use    Vaping Use: Never used   Substance and Sexual Activity    Alcohol use: No    Drug use: No    Sexual activity: Yes     Partners: Male   Other Topics Concern    Not on file   Social History Narrative    ** Merged History Encounter **          Social Determinants of Health     Financial Resource Strain: Low Risk     Difficulty of Paying Living Expenses: Not very hard   Food Insecurity: No Food Insecurity    Worried About Running Out of Food in the Last Year: Never true    Theron of Food in the Last Year: Never true   Transportation Needs:     Lack of Transportation (Medical): Not on file    Lack of Transportation (Non-Medical):  Not on file   Physical Activity: Insufficiently Active    Days of Exercise per Week: 3 days    Minutes of Exercise per Session: 30 min   Stress:     Feeling of Stress : Not on file   Social Connections:     Frequency of Communication with Friends and Family: Not on file    Frequency of Social Gatherings with Friends and Family: Not on file    Attends Jewish Services: Not on file    Active Member of Clubs or Organizations: Not on file    Attends Club or Organization Meetings: Not on file    Marital Status: Not on file   Intimate Partner Violence: Not At Risk    Fear of Current or Ex-Partner: No    Emotionally Abused: No    Physically Abused: No    Sexually Abused: No   Housing Stability:     Unable to Pay for Housing in the Last Year: Not on file    Number of [place] in the Last Year: Not on file    Unstable Housing in the Last Year: Not on file       Review of Systems:  Review of Systems   Musculoskeletal: Positive for back pain. Gastrointestinal: Negative for bowel incontinence. Genitourinary: Negative for bladder incontinence. Neurological: Negative for headaches and numbness. Physical Exam:  Ht 5' 5\" (1.651 m)   Wt 164 lb 6.4 oz (74.6 kg)   BMI 27.36 kg/m²     Physical Exam  Constitutional:       Appearance: Normal appearance. Pulmonary:      Effort: Pulmonary effort is normal.   Neurological:      Mental Status: She is alert. Psychiatric:         Attention and Perception: Attention and perception normal.         Mood and Affect: Mood and affect normal.         Record/Diagnostics Review:    As above, I did independently review the imaging    Orders:  Orders Placed This Encounter   Procedures    NE NJX AA&/STRD TFRML EPI LUMBAR/SACRAL 1 LEVEL    NE NJX AA&/STRD TFRML EPI LUMBAR/SACRAL EA ADDL       Assessment:  1. Lumbosacral neuritis    2. Lumbosacral spondylosis without myelopathy        Treatment Plan:  DISCUSSION: Treatment options discussed with patient and all questions answered to patient's satisfaction. OARRS Review: Reviewed and acceptable for medications prescribed.   TREATMENT OPTIONS:     Discussed different treatment options including continued conservative care such as physical therapy, chiropractic care, acupuncture. Discussed different interventional options such as epidural steroids or medial branch blocks. Also discussed surgical evaluation. To the low back down the right leg. She has done very well with epidural steroid injections in the past, wishes to repeat, I think that is reasonable. Pain in the low back and legs continues despite conservative measures, may benefit from epidural steroid injections, we'll try the Right L4 and L5 transforaminal approach x 2    Consider repeat RFA in the future. If improvement with epidurals would then get updated MRI of the lumbar spine    Urvashi Riley M.D. I have reviewed the chief complaint and history of present illness (including ROS and PFSH) and vital documentation by my staff and I agree with their documentation and have added where applicable.

## 2022-06-09 DIAGNOSIS — G89.29 CHRONIC LOW BACK PAIN, UNSPECIFIED BACK PAIN LATERALITY, UNSPECIFIED WHETHER SCIATICA PRESENT: ICD-10-CM

## 2022-06-09 DIAGNOSIS — M54.50 CHRONIC LOW BACK PAIN, UNSPECIFIED BACK PAIN LATERALITY, UNSPECIFIED WHETHER SCIATICA PRESENT: ICD-10-CM

## 2022-06-09 RX ORDER — IBUPROFEN 800 MG/1
TABLET ORAL
Qty: 90 TABLET | Refills: 0 | Status: SHIPPED | OUTPATIENT
Start: 2022-06-09 | End: 2022-08-15

## 2022-06-27 ENCOUNTER — HOSPITAL ENCOUNTER (OUTPATIENT)
Age: 42
Discharge: HOME | End: 2022-06-27
Payer: COMMERCIAL

## 2022-06-27 LAB
ABSOLUTE EOS #: 0.07 K/UL (ref 0–0.44)
ABSOLUTE IMMATURE GRANULOCYTE: <0.03 K/UL (ref 0–0.3)
ABSOLUTE LYMPH #: 2.03 K/UL (ref 1.1–3.7)
ABSOLUTE MONO #: 0.45 K/UL (ref 0.1–1.2)
ALBUMIN SERPL-MCNC: 4.3 G/DL (ref 3.5–5.2)
ALBUMIN/GLOBULIN RATIO: 1.6 (ref 1–2.5)
ALP BLD-CCNC: 67 U/L (ref 35–104)
ALT SERPL-CCNC: 13 U/L (ref 5–33)
ANION GAP SERPL CALCULATED.3IONS-SCNC: 12 MMOL/L (ref 9–17)
AST SERPL-CCNC: 16 U/L
BASOPHILS # BLD: 1 % (ref 0–2)
BASOPHILS ABSOLUTE: 0.05 K/UL (ref 0–0.2)
BILIRUB SERPL-MCNC: 0.32 MG/DL (ref 0.3–1.2)
BUN BLDV-MCNC: 11 MG/DL (ref 6–20)
CALCIUM SERPL-MCNC: 9.2 MG/DL (ref 8.6–10.4)
CHLORIDE BLD-SCNC: 102 MMOL/L (ref 98–107)
CO2: 24 MMOL/L (ref 20–31)
CREAT SERPL-MCNC: 0.6 MG/DL (ref 0.5–0.9)
EOSINOPHILS RELATIVE PERCENT: 1 % (ref 1–4)
FERRITIN: 12 NG/ML (ref 13–150)
GFR AFRICAN AMERICAN: >60 ML/MIN
GFR NON-AFRICAN AMERICAN: >60 ML/MIN
GFR SERPL CREATININE-BSD FRML MDRD: ABNORMAL ML/MIN/{1.73_M2}
GLUCOSE BLD-MCNC: 130 MG/DL (ref 70–99)
HCT VFR BLD CALC: 35.9 % (ref 36.3–47.1)
HEMOGLOBIN: 11.5 G/DL (ref 11.9–15.1)
IMMATURE GRANULOCYTES: 0 %
IRON SATURATION: 30 % (ref 20–55)
IRON: 106 UG/DL (ref 37–145)
LYMPHOCYTES # BLD: 23 % (ref 24–43)
MCH RBC QN AUTO: 27.8 PG (ref 25.2–33.5)
MCHC RBC AUTO-ENTMCNC: 32 G/DL (ref 28.4–34.8)
MCV RBC AUTO: 86.9 FL (ref 82.6–102.9)
MONOCYTES # BLD: 5 % (ref 3–12)
NRBC AUTOMATED: 0 PER 100 WBC
PDW BLD-RTO: 13.5 % (ref 11.8–14.4)
PLATELET # BLD: 301 K/UL (ref 138–453)
PMV BLD AUTO: 11.1 FL (ref 8.1–13.5)
POTASSIUM SERPL-SCNC: 4.2 MMOL/L (ref 3.7–5.3)
RBC # BLD: 4.13 M/UL (ref 3.95–5.11)
SEG NEUTROPHILS: 70 % (ref 36–65)
SEGMENTED NEUTROPHILS ABSOLUTE COUNT: 6.17 K/UL (ref 1.5–8.1)
SODIUM BLD-SCNC: 138 MMOL/L (ref 135–144)
TOTAL IRON BINDING CAPACITY: 358 UG/DL (ref 250–450)
TOTAL PROTEIN: 7 G/DL (ref 6.4–8.3)
UNSATURATED IRON BINDING CAPACITY: 252 UG/DL (ref 112–347)
WBC # BLD: 8.8 K/UL (ref 3.5–11.3)

## 2022-06-27 PROCEDURE — 80053 COMPREHEN METABOLIC PANEL: CPT

## 2022-06-27 PROCEDURE — 83540 ASSAY OF IRON: CPT

## 2022-06-27 PROCEDURE — 83550 IRON BINDING TEST: CPT

## 2022-06-27 PROCEDURE — 36415 COLL VENOUS BLD VENIPUNCTURE: CPT

## 2022-06-27 PROCEDURE — 82728 ASSAY OF FERRITIN: CPT

## 2022-06-27 PROCEDURE — 85025 COMPLETE CBC W/AUTO DIFF WBC: CPT

## 2022-07-21 ENCOUNTER — HOSPITAL ENCOUNTER (OUTPATIENT)
Dept: PAIN MANAGEMENT | Facility: CLINIC | Age: 42
Discharge: HOME | End: 2022-07-21
Payer: COMMERCIAL

## 2022-07-21 VITALS
BODY MASS INDEX: 27.49 KG/M2 | RESPIRATION RATE: 19 BRPM | HEIGHT: 65 IN | TEMPERATURE: 97.4 F | WEIGHT: 165 LBS | OXYGEN SATURATION: 98 % | HEART RATE: 75 BPM | SYSTOLIC BLOOD PRESSURE: 106 MMHG | DIASTOLIC BLOOD PRESSURE: 65 MMHG

## 2022-07-21 DIAGNOSIS — R52 PAIN MANAGEMENT: ICD-10-CM

## 2022-07-21 LAB — HCG, PREGNANCY URINE (POC): NEGATIVE

## 2022-07-21 PROCEDURE — 81025 URINE PREGNANCY TEST: CPT

## 2022-07-21 PROCEDURE — 6360000002 HC RX W HCPCS: Performed by: PAIN MEDICINE

## 2022-07-21 PROCEDURE — 6360000004 HC RX CONTRAST MEDICATION: Performed by: PAIN MEDICINE

## 2022-07-21 PROCEDURE — 64484 NJX AA&/STRD TFRM EPI L/S EA: CPT | Performed by: PAIN MEDICINE

## 2022-07-21 PROCEDURE — 2500000003 HC RX 250 WO HCPCS: Performed by: PAIN MEDICINE

## 2022-07-21 PROCEDURE — 2580000003 HC RX 258: Performed by: PAIN MEDICINE

## 2022-07-21 PROCEDURE — A4216 STERILE WATER/SALINE, 10 ML: HCPCS | Performed by: PAIN MEDICINE

## 2022-07-21 PROCEDURE — 64483 NJX AA&/STRD TFRM EPI L/S 1: CPT

## 2022-07-21 PROCEDURE — 64483 NJX AA&/STRD TFRM EPI L/S 1: CPT | Performed by: PAIN MEDICINE

## 2022-07-21 RX ORDER — SODIUM CHLORIDE 0.9 % (FLUSH) 0.9 %
SYRINGE (ML) INJECTION
Status: COMPLETED | OUTPATIENT
Start: 2022-07-21 | End: 2022-07-21

## 2022-07-21 RX ORDER — DEXAMETHASONE SODIUM PHOSPHATE 10 MG/ML
INJECTION, SOLUTION INTRAMUSCULAR; INTRAVENOUS
Status: COMPLETED | OUTPATIENT
Start: 2022-07-21 | End: 2022-07-21

## 2022-07-21 RX ORDER — MIDAZOLAM HYDROCHLORIDE 2 MG/2ML
INJECTION, SOLUTION INTRAMUSCULAR; INTRAVENOUS
Status: COMPLETED | OUTPATIENT
Start: 2022-07-21 | End: 2022-07-21

## 2022-07-21 RX ORDER — LIDOCAINE HYDROCHLORIDE 10 MG/ML
INJECTION, SOLUTION EPIDURAL; INFILTRATION; INTRACAUDAL; PERINEURAL
Status: COMPLETED | OUTPATIENT
Start: 2022-07-21 | End: 2022-07-21

## 2022-07-21 RX ORDER — SODIUM CHLORIDE 9 MG/ML
INJECTION INTRAVENOUS
Status: COMPLETED | OUTPATIENT
Start: 2022-07-21 | End: 2022-07-21

## 2022-07-21 RX ADMIN — Medication 3 ML: at 11:01

## 2022-07-21 RX ADMIN — DEXAMETHASONE SODIUM PHOSPHATE 10 MG: 10 INJECTION, SOLUTION INTRAMUSCULAR; INTRAVENOUS at 11:07

## 2022-07-21 RX ADMIN — SODIUM CHLORIDE 3 ML: 9 INJECTION INTRAMUSCULAR; INTRAVENOUS; SUBCUTANEOUS at 11:04

## 2022-07-21 RX ADMIN — LIDOCAINE HYDROCHLORIDE 3 ML: 10 INJECTION, SOLUTION EPIDURAL; INFILTRATION; INTRACAUDAL at 11:04

## 2022-07-21 RX ADMIN — IOHEXOL 3 ML: 180 INJECTION INTRAVENOUS at 11:07

## 2022-07-21 RX ADMIN — MIDAZOLAM HYDROCHLORIDE 2 MG: 1 INJECTION, SOLUTION INTRAMUSCULAR; INTRAVENOUS at 11:01

## 2022-07-21 ASSESSMENT — PAIN - FUNCTIONAL ASSESSMENT
PAIN_FUNCTIONAL_ASSESSMENT: 0-10
PAIN_FUNCTIONAL_ASSESSMENT: 0-10
PAIN_FUNCTIONAL_ASSESSMENT: PREVENTS OR INTERFERES SOME ACTIVE ACTIVITIES AND ADLS

## 2022-07-21 ASSESSMENT — PAIN DESCRIPTION - DESCRIPTORS: DESCRIPTORS: SHOOTING;STABBING

## 2022-07-21 NOTE — H&P
Pain Pre-Op H&P Note    Damion Lino MD    HPI: Sneha Morales  presents with back and leg pain.     Past Medical History:   Diagnosis Date    Asthma     Cancer ([address]     Thyroid    Controlled type 2 diabetes mellitus without complication, without long-term current use of insulin (HCC)     Thyroid cancer [facility])        Past Surgical History:   Procedure Laterality Date     SECTION      NERVE BLOCK Right 2021    : LUMBAR TRANSFORAMINAL L4-5 (Right )    PAIN MANAGEMENT PROCEDURE Right 2021    LUMBAR TRANSFORAMINAL L4-5 performed by Damion Lino MD at [address] [place] [address]  2021    LUMBAR TRANSFORAMINAL L4/5 - Right    PAIN MANAGEMENT PROCEDURE Right 2021    LUMBAR TRANSFORAMINAL L4/5 performed by Damion Lino MD at [address] [place] [address] Right 2021    RIGHT L4/5 LUMBAR TRANSFORAMINAL - Right    PAIN MANAGEMENT PROCEDURE Right 2021    RIGHT L4/5 LUMBAR TRANSFORAMINAL performed by Damion Lino MD at [address] [place] [address] 2017       Family History   Problem Relation Age of Onset    Other Mother         MTHFR Mutation    Diabetes Father         IDDM    Stroke Father     Heart Disease Father     Heart Attack Father     Arrhythmia Father     Arthritis Maternal Grandmother     Cancer Maternal Grandmother     Heart Failure Maternal Grandmother     Neuropathy Maternal Grandmother     Parkinsonism Maternal Grandfather     Arthritis Maternal Grandfather     Cancer Maternal Grandfather     High Blood Pressure Paternal Grandmother     Prostate Cancer Paternal Grandfather     Cancer Paternal Grandfather        No Known Allergies      Current Outpatient Medications:     ibuprofen (ADVIL;MOTRIN) 800 MG tablet, TAKE 1 TABLET BY MOUTH EVERY EIGHT HOURS AS NEEDED FOR PAIN, Disp: 90 tablet, Rfl: 0    LEVEMIR FLEXTOUCH 100 UNIT/ML injection pen, INJECT 25 UNITS SUBCUTANEOUSLY IN THE MORNING, Disp: , Rfl: HUMALOG KWIKPEN 100 UNIT/ML SOPN, , Disp: , Rfl:     TRUEPLUS PEN NEEDLES 32G X 4 MM MISC, USE THREE TIMES A DAY, Disp: , Rfl:     triamcinolone (ARISTOCORT) 0.5 % cream, APPLY A THIN LAYER TO THE AFFECTED AREA TWICE DAILY, Disp: , Rfl:     nystatin (MYCOSTATIN) 418177 UNIT/GM powder, APPLY TO THE AFFECTED AREA TWICE DAILY AS NEEDED, Disp: , Rfl:     gabapentin (NEURONTIN) 300 MG capsule, , Disp: , Rfl:     FEROSUL 325 (65 Fe) MG tablet, TAKE 1 TABLET BY MOUTH TWO TIMES A DAY (Patient not taking: Reported on 7/21/2022), Disp: , Rfl:     ISIBLOOM 0.15-30 MG-MCG per tablet, TAKE 1 TABLET BY MOUTH EVERY DAY CONTINUOUSLY, Disp: , Rfl:     levothyroxine (SYNTHROID) 125 MCG tablet, Take 125 mcg by mouth daily , Disp: , Rfl:     omeprazole (PRILOSEC) 20 MG delayed release capsule, TAKE 1 CAPSULE BY MOUTH EVERY DAY, Disp: , Rfl:     glipiZIDE (GLUCOTROL) 10 MG tablet, , Disp: , Rfl:     metFORMIN (GLUCOPHAGE) 500 MG tablet, Take 500 mg by mouth 3 times daily, Disp: , Rfl:     liothyronine (CYTOMEL) 5 MCG tablet, Take 5 mcg by mouth 2 times daily, Disp: , Rfl:     Cetirizine HCl (ZYRTEC ALLERGY PO), Take by mouth, Disp: , Rfl:     B Complex-Folic Acid (F-008 BALANCED TR PO), Take 1 tablet by mouth 3 times daily (before meals), Disp: , Rfl:     Calcium-Magnesium-Vitamin D [phone] MG-MG-UNIT TABS, Take 1 tablet by mouth 3 times daily (before meals), Disp: , Rfl:   No current facility-administered medications for this encounter. Facility-Administered Medications Ordered in Other Encounters:     thyrotropin dajuan (THYROGEN) injection 0.9 mg, 0.9 mg, IntraMUSCular, Once, Yoan Diamond MD    Social History     Tobacco Use    Smoking status: Never    Smokeless tobacco: Never   Substance Use Topics    Alcohol use: No       Review of Systems:   Focused review of systems was performed, and negative as pertinent to diagnosis, except as stated in HPI. Physical Exam  Constitutional:       Appearance: Normal appearance. Pulmonary:      Effort: Pulmonary effort is normal.   Neurological:      Mental Status: alert. Psychiatric:         Attention and Perception: Attention and perception normal.         Mood and Affect: Mood and affect normal.   Cardiovascular:      Rate: Normal rate. ASA: 3          Mallampati: 2       Patient's current physical status, medications, medical history, and HPI have been reviewed and updated as appropriate on this date: 07/21/22    Risk/Benefit(s): The risks, benefits, alternatives, and potential complications have been discussed with the patient/family and informed consent has been obtained for the procedure/sedation.     Diagnosis:   radiculopathy      Plan: epidural        Tevin Hill MD

## 2022-07-21 NOTE — OP NOTE
Transforaminal Epidural Steroid Injection:  SURGEON: Marcos Kaplan MD    PRE-OP DIAGNOSIS: M54.17 (lumbosacral neuritis), M54.5 (low back pain)    POST-OP DIAGNOSIS: Same. PROCEDURE PERFORMED:  Right L4 and L5 Lumbar Transforaminal FAZAL selective nerve root block. Physician confirmed and marked the surgical site. EBL: minimal    CONSENT: Patient has undergone the educational process with this procedure, is aware and fully understands the risks involved: potential damage to any and all body organs including possible bleeding, infection, and nerve injury, allergic reaction and headache. Patient also understands that the procedure will be undertaken in a safe, controlled and monitored setting. Patient recognizes that the benefits may include relief from pain and reduction in the oral use of medications. Patient agreed to proceed. The patient was counseled at length about the risks of grazyna Covid-19 during their perioperative period and any recovery window from their procedure. The patient was made aware that grazyna Covid-19  may worsen their prognosis for recovering from their procedure  and lend to a higher morbidity and/or mortality risk. All material risks, benefits, and reasonable alternatives including postponing the procedure were discussed. The patient does wish to proceed with the procedure at this time. PREP: The patient was placed in the prone position and padded appropriately. The injection site was prepped with chloroprep and draped appropriately. 5ml of 0.5% lidocaine was used to anesthetize the skin and subcutaneous tissue. PROCEDURE NOTE: A 22 gauge 3.5 inch Pencil-Point needle was then advanced to the Right L4 and L5 neuroforamen using a wide paramedian approach under fluoroscopic guidance. Aspiration was negative for blood, CSF and producing pain.  Contrast Medium: 1 ml of (omnipaque) contrast was then injected and the following was noted: appropriate spread of contrast along the nerve root sheath and adjacent epidural space 5mg Dexamethasone mixed with 1.5 ml of 0.5 % lidocaine was then injected into the nerve root sheath of each of the indicated levels. The needle was withdrawn by the physician and the nurse applied a sterile dressing. The patient tolerated the procedure well. No complications occured. Patient transferred to the recovery room in satisfatory condition. Appropriate written discharge instructions given to the patient.     Wyatt Davenport MD

## 2022-07-21 NOTE — DISCHARGE INSTRUCTIONS

## 2022-07-25 ENCOUNTER — TELEPHONE (OUTPATIENT)
Dept: PAIN MANAGEMENT | Age: 42
End: 2022-07-25

## 2022-07-25 NOTE — TELEPHONE ENCOUNTER
Procedure: Transforaminal FAZAL RL4/L5  DOS: 7/21/2022    What percentage of  pain relief from procedure did you receive 50%    Success yes  OR Scheduled  8/11/2022

## 2022-07-28 ENCOUNTER — OFFICE VISIT (OUTPATIENT)
Dept: ORTHOPEDIC SURGERY | Age: 42
End: 2022-07-28
Payer: COMMERCIAL

## 2022-07-28 DIAGNOSIS — M54.10 RADICULAR LEG PAIN: ICD-10-CM

## 2022-07-28 DIAGNOSIS — G89.29 CHRONIC LOW BACK PAIN, UNSPECIFIED BACK PAIN LATERALITY, UNSPECIFIED WHETHER SCIATICA PRESENT: Primary | ICD-10-CM

## 2022-07-28 DIAGNOSIS — M54.50 CHRONIC LOW BACK PAIN, UNSPECIFIED BACK PAIN LATERALITY, UNSPECIFIED WHETHER SCIATICA PRESENT: Primary | ICD-10-CM

## 2022-07-28 DIAGNOSIS — M51.36 DDD (DEGENERATIVE DISC DISEASE), LUMBAR: ICD-10-CM

## 2022-07-28 PROCEDURE — 1036F TOBACCO NON-USER: CPT | Performed by: ORTHOPAEDIC SURGERY

## 2022-07-28 PROCEDURE — G8419 CALC BMI OUT NRM PARAM NOF/U: HCPCS | Performed by: ORTHOPAEDIC SURGERY

## 2022-07-28 PROCEDURE — 99213 OFFICE O/P EST LOW 20 MIN: CPT | Performed by: ORTHOPAEDIC SURGERY

## 2022-07-28 PROCEDURE — G8427 DOCREV CUR MEDS BY ELIG CLIN: HCPCS | Performed by: ORTHOPAEDIC SURGERY

## 2022-07-28 NOTE — PROGRESS NOTES
Patient ID: Joellen Mora is a 42 y.o. female    Chief Compliant:  Chief Complaint   Patient presents with    Follow-up     Low back pain        Diagnostic imaging:        Assessment and Plan:  1. Chronic low back pain, unspecified back pain laterality, unspecified whether sciatica present    2. DDD (degenerative disc disease), lumbar    3. Radicular leg pain      Continue LESI with pain management    Patient with excellent outcome with epidural steroid injections at this juncture      Follow up prn    HPI:  This is a 42 y.o. female who presents to the clinic today for low back  pain     Patient has underwent LESI with significant relief of back pain. Review of Systems   All other systems reviewed and are negative.       Past History:    Current Outpatient Medications:     ibuprofen (ADVIL;MOTRIN) 800 MG tablet, TAKE 1 TABLET BY MOUTH EVERY EIGHT HOURS AS NEEDED FOR PAIN, Disp: 90 tablet, Rfl: 0    LEVEMIR FLEXTOUCH 100 UNIT/ML injection pen, INJECT 25 UNITS SUBCUTANEOUSLY IN THE MORNING, Disp: , Rfl:     HUMALOG KWIKPEN 100 UNIT/ML SOPN, , Disp: , Rfl:     TRUEPLUS PEN NEEDLES 32G X 4 MM MISC, USE THREE TIMES A DAY, Disp: , Rfl:     triamcinolone (ARISTOCORT) 0.5 % cream, APPLY A THIN LAYER TO THE AFFECTED AREA TWICE DAILY, Disp: , Rfl:     nystatin (MYCOSTATIN) 276048 UNIT/GM powder, APPLY TO THE AFFECTED AREA TWICE DAILY AS NEEDED, Disp: , Rfl:     gabapentin (NEURONTIN) 300 MG capsule, , Disp: , Rfl:     FEROSUL 325 (65 Fe) MG tablet, , Disp: , Rfl:     ISIBLOOM 0.15-30 MG-MCG per tablet, TAKE 1 TABLET BY MOUTH EVERY DAY CONTINUOUSLY, Disp: , Rfl:     levothyroxine (SYNTHROID) 125 MCG tablet, Take 125 mcg by mouth daily , Disp: , Rfl:     omeprazole (PRILOSEC) 20 MG delayed release capsule, TAKE 1 CAPSULE BY MOUTH EVERY DAY, Disp: , Rfl:     glipiZIDE (GLUCOTROL) 10 MG tablet, , Disp: , Rfl:     metFORMIN (GLUCOPHAGE) 500 MG tablet, Take 500 mg by mouth 3 times daily, Disp: , Rfl:     liothyronine (CYTOMEL) 5 MCG tablet, Take 5 mcg by mouth 2 times daily, Disp: , Rfl:     Cetirizine HCl (ZYRTEC ALLERGY PO), Take by mouth, Disp: , Rfl:     B Complex-Folic Acid (C-645 BALANCED TR PO), Take 1 tablet by mouth 3 times daily (before meals), Disp: , Rfl:     Calcium-Magnesium-Vitamin D [phone] MG-MG-UNIT TABS, Take 1 tablet by mouth 3 times daily (before meals), Disp: , Rfl:   No Known Allergies  Social History     Socioeconomic History    Marital status: Single     Spouse name: Not on file    Number of children: Not on file    Years of education: Not on file    Highest education level: Not on file   Occupational History    Not on file   Tobacco Use    Smoking status: Never    Smokeless tobacco: Never   Vaping Use    Vaping Use: Never used   Substance and Sexual Activity    Alcohol use: No    Drug use: No    Sexual activity: Yes     Partners: Male   Other Topics Concern    Not on file   Social History Narrative    ** Merged History Encounter **          Social Determinants of Health     Financial Resource Strain: Low Risk     Difficulty of Paying Living Expenses: Not very hard   Food Insecurity: No Food Insecurity    Worried About Running Out of Food in the Last Year: Never true    Ran Out of Food in the Last Year: Never true   Transportation Needs: Not on file   Physical Activity: Insufficiently Active    Days of Exercise per Week: 3 days    Minutes of Exercise per Session: 30 min   Stress: Not on file   Social Connections: Not on file   Intimate Partner Violence: Not At Risk    Fear of Current or Ex-Partner: No    Emotionally Abused: No    Physically Abused: No    Sexually Abused: No   Housing Stability: Not on file     Past Medical History:   Diagnosis Date    Asthma     Cancer ([facility]     Thyroid    Controlled type 2 diabetes mellitus without complication, without long-term current use of insulin ([facility]     Thyroid cancer ([facility]      Past Surgical History:   Procedure Laterality Date     SECTION      NERVE BLOCK Right 04/23/2021    : LUMBAR TRANSFORAMINAL L4-5 (Right )    PAIN MANAGEMENT PROCEDURE Right 4/23/2021    LUMBAR TRANSFORAMINAL L4-5 performed by Kimberlyn Reyes MD at [facility]  05/21/2021    LUMBAR TRANSFORAMINAL L4/5 - Right    PAIN MANAGEMENT PROCEDURE Right 5/21/2021    LUMBAR TRANSFORAMINAL L4/5 performed by Kimberlyn Reyes MD at [facility] Right 07/23/2021    RIGHT L4/5 LUMBAR TRANSFORAMINAL - Right    PAIN MANAGEMENT PROCEDURE Right 7/23/2021    RIGHT L4/5 LUMBAR TRANSFORAMINAL performed by Kimberlyn Reyes MD at [facility] Bilateral 2017     Family History   Problem Relation Age of Onset    Other Mother         MTHFR Mutation    Diabetes Father         IDDM    Stroke Father     Heart Disease Father     Heart Attack Father     Arrhythmia Father     Arthritis Maternal Grandmother     Cancer Maternal Grandmother     Heart Failure Maternal Grandmother     Neuropathy Maternal Grandmother     Parkinsonism Maternal Grandfather     Arthritis Maternal Grandfather     Cancer Maternal Grandfather     High Blood Pressure Paternal Grandmother     Prostate Cancer Paternal Grandfather     Cancer Paternal Grandfather         Physical Exam:  Vitals signs and nursing note reviewed. Constitutional:       Appearance: well-developed. HENT:      Head: Normocephalic and atraumatic. Nose: Nose normal.   Eyes:      Conjunctiva/sclera: Conjunctivae normal.   Neck:      Musculoskeletal: Normal range of motion and neck supple. Pulmonary:      Effort: Pulmonary effort is normal. No respiratory distress. Musculoskeletal:      Comments: Normal gait     Skin:     General: Skin is warm and dry. Neurological:      Mental Status: Alert and oriented to person, place, and time. Sensory: No sensory deficit. Psychiatric:         Behavior: Behavior normal.         Thought Content:  Thought content normal.        Provider Attestation:  Nubia Swanson, personally performed the services described in this documentation. All medical record entries made by the scribe were at my direction and in my presence. I have reviewed the chart and discharge instructions and agree that the records reflect my personal performance and is accurate and complete. Tahir Hutchison MD 7/28/22       Scribe Attestation:  By signing my name below, Steven Muro, attest that this documentation has been prepared under the direction and in the presence of Dr. Wilber Villalobos. Electronically signed: Julieth Lang, 7/28/22     Please note that this chart was generated using voice recognition Dragon dictation software. Although every effort was made to ensure the accuracy of this automated transcription, some errors in transcription may have occurred.

## 2022-08-05 PROBLEM — E66.01 MORBID OBESITY (HCC): Status: ACTIVE | Noted: 2018-01-18

## 2022-08-05 PROBLEM — E03.9 ACQUIRED HYPOTHYROIDISM: Status: ACTIVE | Noted: 2017-10-05

## 2022-08-05 PROBLEM — C73 MALIGNANT NEOPLASM OF THYROID GLAND (HCC): Status: ACTIVE | Noted: 2017-10-05

## 2022-08-05 PROBLEM — C73 PAPILLARY THYROID CARCINOMA (HCC): Status: ACTIVE | Noted: 2017-10-18

## 2022-08-05 PROBLEM — Z80.9 FAMILY HISTORY OF MALIGNANT NEOPLASM: Status: ACTIVE | Noted: 2017-10-05

## 2022-08-13 DIAGNOSIS — G89.29 CHRONIC LOW BACK PAIN, UNSPECIFIED BACK PAIN LATERALITY, UNSPECIFIED WHETHER SCIATICA PRESENT: ICD-10-CM

## 2022-08-13 DIAGNOSIS — M54.50 CHRONIC LOW BACK PAIN, UNSPECIFIED BACK PAIN LATERALITY, UNSPECIFIED WHETHER SCIATICA PRESENT: ICD-10-CM

## 2022-08-15 RX ORDER — IBUPROFEN 800 MG/1
TABLET ORAL
Qty: 90 TABLET | Refills: 0 | Status: SHIPPED | OUTPATIENT
Start: 2022-08-15 | End: 2022-09-21

## 2022-08-15 NOTE — TELEPHONE ENCOUNTER
Last rx 6/9/22. Pharmacy requested refill. This is a chronic problem.  Patient states that she had COVID in January she has been getting intermittent episodes of sudden onset of watery diarrhea.  She started trying different probiotics and when she drinks Kombuche  her symptoms are much improved.

## 2022-09-16 ENCOUNTER — HOSPITAL ENCOUNTER (OUTPATIENT)
Dept: GENERAL RADIOLOGY | Facility: CLINIC | Age: 42
Discharge: HOME | End: 2022-09-18
Payer: COMMERCIAL

## 2022-09-16 ENCOUNTER — HOSPITAL ENCOUNTER (OUTPATIENT)
Age: 42
Setting detail: SPECIMEN
Discharge: HOME | End: 2022-09-16

## 2022-09-16 ENCOUNTER — HOSPITAL ENCOUNTER (OUTPATIENT)
Facility: CLINIC | Age: 42
Discharge: HOME | End: 2022-09-18
Payer: COMMERCIAL

## 2022-09-16 DIAGNOSIS — M54.2 NECK PAIN: ICD-10-CM

## 2022-09-16 DIAGNOSIS — M54.50 CHRONIC LOW BACK PAIN, UNSPECIFIED BACK PAIN LATERALITY, UNSPECIFIED WHETHER SCIATICA PRESENT: ICD-10-CM

## 2022-09-16 DIAGNOSIS — M54.50 PAIN, LUMBAR REGION: ICD-10-CM

## 2022-09-16 DIAGNOSIS — G89.29 CHRONIC LOW BACK PAIN, UNSPECIFIED BACK PAIN LATERALITY, UNSPECIFIED WHETHER SCIATICA PRESENT: ICD-10-CM

## 2022-09-16 LAB
ABSOLUTE EOS #: 0.11 K/UL (ref 0–0.44)
ABSOLUTE IMMATURE GRANULOCYTE: <0.03 K/UL (ref 0–0.3)
ABSOLUTE LYMPH #: 1.38 K/UL (ref 1.1–3.7)
ABSOLUTE MONO #: 0.4 K/UL (ref 0.1–1.2)
BASOPHILS # BLD: 1 % (ref 0–2)
BASOPHILS ABSOLUTE: 0.04 K/UL (ref 0–0.2)
EOSINOPHILS RELATIVE PERCENT: 2 % (ref 1–4)
FERRITIN: 130 NG/ML (ref 13–150)
HCT VFR BLD CALC: 38.7 % (ref 36.3–47.1)
HEMOGLOBIN: 12.6 G/DL (ref 11.9–15.1)
IMMATURE GRANULOCYTES: 0 %
IRON SATURATION: 34 % (ref 20–55)
IRON: 82 UG/DL (ref 37–145)
LYMPHOCYTES # BLD: 28 % (ref 24–43)
MCH RBC QN AUTO: 29.2 PG (ref 25.2–33.5)
MCHC RBC AUTO-ENTMCNC: 32.6 G/DL (ref 28.4–34.8)
MCV RBC AUTO: 89.8 FL (ref 82.6–102.9)
MONOCYTES # BLD: 8 % (ref 3–12)
NRBC AUTOMATED: 0 PER 100 WBC
PDW BLD-RTO: 15.4 % (ref 11.8–14.4)
PLATELET # BLD: 254 K/UL (ref 138–453)
PMV BLD AUTO: 12 FL (ref 8.1–13.5)
RBC # BLD: 4.31 M/UL (ref 3.95–5.11)
RBC # BLD: ABNORMAL 10*6/UL
SEG NEUTROPHILS: 61 % (ref 36–65)
SEGMENTED NEUTROPHILS ABSOLUTE COUNT: 3 K/UL (ref 1.5–8.1)
TOTAL IRON BINDING CAPACITY: 243 UG/DL (ref 250–450)
UNSATURATED IRON BINDING CAPACITY: 161 UG/DL (ref 112–347)
WBC # BLD: 5 K/UL (ref 3.5–11.3)

## 2022-09-16 PROCEDURE — 72040 X-RAY EXAM NECK SPINE 2-3 VW: CPT

## 2022-09-16 PROCEDURE — 72100 X-RAY EXAM L-S SPINE 2/3 VWS: CPT

## 2022-09-19 NOTE — TELEPHONE ENCOUNTER
Pharmacy requested refill for ibu 800mg for chronic LBP  90 tabs with 0 refills    LOV 7/28/22  Last filled on 8/15/22    Med pended

## 2022-09-21 RX ORDER — IBUPROFEN 800 MG/1
TABLET ORAL
Qty: 90 TABLET | Refills: 0 | Status: SHIPPED | OUTPATIENT
Start: 2022-09-21 | End: 2022-10-17

## 2022-10-06 ENCOUNTER — HOSPITAL ENCOUNTER (OUTPATIENT)
Dept: PAIN MANAGEMENT | Facility: CLINIC | Age: 42
Discharge: HOME | End: 2022-10-06
Payer: COMMERCIAL

## 2022-10-06 VITALS
HEART RATE: 69 BPM | RESPIRATION RATE: 14 BRPM | OXYGEN SATURATION: 100 % | HEIGHT: 65 IN | TEMPERATURE: 98.8 F | BODY MASS INDEX: 27.49 KG/M2 | DIASTOLIC BLOOD PRESSURE: 73 MMHG | SYSTOLIC BLOOD PRESSURE: 108 MMHG | WEIGHT: 165 LBS

## 2022-10-06 DIAGNOSIS — R52 PAIN MANAGEMENT: ICD-10-CM

## 2022-10-06 LAB — HCG, PREGNANCY URINE (POC): NEGATIVE

## 2022-10-06 PROCEDURE — 81025 URINE PREGNANCY TEST: CPT

## 2022-10-06 PROCEDURE — 64483 NJX AA&/STRD TFRM EPI L/S 1: CPT | Performed by: PAIN MEDICINE

## 2022-10-06 PROCEDURE — 6360000004 HC RX CONTRAST MEDICATION: Performed by: PAIN MEDICINE

## 2022-10-06 PROCEDURE — 64484 NJX AA&/STRD TFRM EPI L/S EA: CPT | Performed by: PAIN MEDICINE

## 2022-10-06 PROCEDURE — 2500000003 HC RX 250 WO HCPCS: Performed by: PAIN MEDICINE

## 2022-10-06 PROCEDURE — 6360000002 HC RX W HCPCS: Performed by: PAIN MEDICINE

## 2022-10-06 PROCEDURE — 2580000003 HC RX 258: Performed by: PAIN MEDICINE

## 2022-10-06 PROCEDURE — 64483 NJX AA&/STRD TFRM EPI L/S 1: CPT

## 2022-10-06 RX ORDER — LIDOCAINE HYDROCHLORIDE 10 MG/ML
INJECTION, SOLUTION EPIDURAL; INFILTRATION; INTRACAUDAL; PERINEURAL
Status: COMPLETED | OUTPATIENT
Start: 2022-10-06 | End: 2022-10-06

## 2022-10-06 RX ORDER — MIDAZOLAM HYDROCHLORIDE 2 MG/2ML
INJECTION, SOLUTION INTRAMUSCULAR; INTRAVENOUS
Status: COMPLETED | OUTPATIENT
Start: 2022-10-06 | End: 2022-10-06

## 2022-10-06 RX ORDER — SODIUM CHLORIDE 0.9 % (FLUSH) 0.9 %
SYRINGE (ML) INJECTION
Status: COMPLETED | OUTPATIENT
Start: 2022-10-06 | End: 2022-10-06

## 2022-10-06 RX ORDER — DEXAMETHASONE SODIUM PHOSPHATE 10 MG/ML
INJECTION, SOLUTION INTRAMUSCULAR; INTRAVENOUS
Status: COMPLETED | OUTPATIENT
Start: 2022-10-06 | End: 2022-10-06

## 2022-10-06 RX ADMIN — IOHEXOL 2 ML: 180 INJECTION INTRAVENOUS at 11:08

## 2022-10-06 RX ADMIN — LIDOCAINE HYDROCHLORIDE 2 ML: 10 INJECTION, SOLUTION EPIDURAL; INFILTRATION; INTRACAUDAL at 11:05

## 2022-10-06 RX ADMIN — Medication 2 ML: at 11:05

## 2022-10-06 RX ADMIN — MIDAZOLAM HYDROCHLORIDE 2 MG: 1 INJECTION, SOLUTION INTRAMUSCULAR; INTRAVENOUS at 10:58

## 2022-10-06 RX ADMIN — DEXAMETHASONE SODIUM PHOSPHATE 10 MG: 10 INJECTION, SOLUTION INTRAMUSCULAR; INTRAVENOUS at 11:09

## 2022-10-06 ASSESSMENT — PAIN DESCRIPTION - DESCRIPTORS: DESCRIPTORS: SHOOTING;STABBING

## 2022-10-06 NOTE — H&P
Pain Pre-Op H&P Note    Sully Hughes MD    HPI: Sneha Morales  presents with back and leg pain    Past Medical History:   Diagnosis Date    Asthma     Cancer ([address]     Thyroid    Controlled type 2 diabetes mellitus without complication, without long-term current use of insulin (HCC)     Thyroid cancer [facility])        Past Surgical History:   Procedure Laterality Date     SECTION      NERVE BLOCK Right 2021    : LUMBAR TRANSFORAMINAL L4-5 (Right )    PAIN MANAGEMENT PROCEDURE Right 2021    LUMBAR TRANSFORAMINAL L4-5 performed by uSlly Hughes MD at [address] [place] [address]  2021    LUMBAR TRANSFORAMINAL L4/5 - Right    PAIN MANAGEMENT PROCEDURE Right 2021    LUMBAR TRANSFORAMINAL L4/5 performed by Sully Hughes MD at [address] [place] [address] Right 2021    RIGHT L4/5 LUMBAR TRANSFORAMINAL - Right    PAIN MANAGEMENT PROCEDURE Right 2021    RIGHT L4/5 LUMBAR TRANSFORAMINAL performed by Sully Hughes MD at [address] [place] [address]       Family History   Problem Relation Age of Onset    Other Mother         MTHFR Mutation    Diabetes Father         IDDM    Stroke Father     Heart Disease Father     Heart Attack Father     Arrhythmia Father     Arthritis Maternal Grandmother     Cancer Maternal Grandmother     Heart Failure Maternal Grandmother     Neuropathy Maternal Grandmother     Parkinsonism Maternal Grandfather     Arthritis Maternal Grandfather     Cancer Maternal Grandfather     High Blood Pressure Paternal Grandmother     Prostate Cancer Paternal Grandfather     Cancer Paternal Grandfather        No Known Allergies      Current Outpatient Medications:     ibuprofen (ADVIL;MOTRIN) 800 MG tablet, TAKE 1 TABLET BY MOUTH EVERY EIGHT HOURS AS NEEDED FOR PAIN, Disp: 90 tablet, Rfl: 0    Magnesium Oxide 500 MG CAPS, Take 1 tablet by mouth 4 times daily (before meals and nightly), Disp: , Rfl: DHEA 10 MG TABS, Take 5 mg by mouth every morning (before breakfast) EFRA if oK with pharmacist, Disp: , Rfl:     Barberry-Oreg Grape-Goldenseal (BERBERINE COMPLEX PO), Take 500 mg by mouth daily (before dinner) Amazing Formulas if OK with pharmacist, Disp: , Rfl:     LEVEMIR FLEXTOUCH 100 UNIT/ML injection pen, INJECT 25 UNITS SUBCUTANEOUSLY IN THE MORNING, Disp: , Rfl:     HUMALOG KWIKPEN 100 UNIT/ML SOPN, , Disp: , Rfl:     TRUEPLUS PEN NEEDLES 32G X 4 MM MISC, USE THREE TIMES A DAY, Disp: , Rfl:     triamcinolone (ARISTOCORT) 0.5 % cream, APPLY A THIN LAYER TO THE AFFECTED AREA TWICE DAILY, Disp: , Rfl:     nystatin (MYCOSTATIN) 368308 UNIT/GM powder, APPLY TO THE AFFECTED AREA TWICE DAILY AS NEEDED, Disp: , Rfl:     gabapentin (NEURONTIN) 300 MG capsule, , Disp: , Rfl:     FEROSUL 325 (65 Fe) MG tablet, , Disp: , Rfl:     ISIBLOOM 0.15-30 MG-MCG per tablet, TAKE 1 TABLET BY MOUTH EVERY DAY CONTINUOUSLY, Disp: , Rfl:     levothyroxine (SYNTHROID) 125 MCG tablet, Take 125 mcg by mouth daily , Disp: , Rfl:     omeprazole (PRILOSEC) 20 MG delayed release capsule, TAKE 1 CAPSULE BY MOUTH EVERY DAY, Disp: , Rfl:     glipiZIDE (GLUCOTROL) 10 MG tablet, , Disp: , Rfl:     metFORMIN (GLUCOPHAGE) 500 MG tablet, Take 500 mg by mouth 3 times daily, Disp: , Rfl:     liothyronine (CYTOMEL) 5 MCG tablet, Take 5 mcg by mouth 2 times daily, Disp: , Rfl:     Cetirizine HCl (ZYRTEC ALLERGY PO), Take by mouth, Disp: , Rfl:     B Complex-Folic Acid (W-521 BALANCED TR PO), Take 1 tablet by mouth 3 times daily (before meals), Disp: , Rfl:     Calcium-Magnesium-Vitamin D 131-875-368 MG-MG-UNIT TABS, Take 1 tablet by mouth 3 times daily (before meals), Disp: , Rfl:   No current facility-administered medications for this encounter.     Facility-Administered Medications Ordered in Other Encounters:     thyrotropin dajuan (THYROGEN) injection 0.9 mg, 0.9 mg, IntraMUSCular, Once, Stephanie Farah MD    Social History     Tobacco Use Smoking status: Never    Smokeless tobacco: Never   Substance Use Topics    Alcohol use: No       Review of Systems:   Focused review of systems was performed, and negative as pertinent to diagnosis, except as stated in HPI. Physical Exam  Constitutional:       Appearance: Normal appearance. Pulmonary:      Effort: Pulmonary effort is normal.   Neurological:      Mental Status: alert. Psychiatric:         Attention and Perception: Attention and perception normal.         Mood and Affect: Mood and affect normal.   Cardiovascular:      Rate: Normal rate. ASA: 3          Mallampati: 2       Patient's current physical status, medications, medical history, and HPI have been reviewed and updated as appropriate on this date: 10/06/22    Risk/Benefit(s): The risks, benefits, alternatives, and potential complications have been discussed with the patient/family and informed consent has been obtained for the procedure/sedation.     Diagnosis:   radiculopathy      Plan: epidural        Blanca Ludwig MD

## 2022-10-06 NOTE — DISCHARGE INSTRUCTIONS

## 2022-10-06 NOTE — OP NOTE
Transforaminal Epidural Steroid Injection:  SURGEON: Hema Hyman MD    PRE-OP DIAGNOSIS: M54.17 (lumbosacral neuritis), M54.5 (low back pain)    POST-OP DIAGNOSIS: Same. PROCEDURE PERFORMED:  Right L4 and L5 Lumbar Transforaminal FAZAL selective nerve root block. Physician confirmed and marked the surgical site. EBL: minimal    CONSENT: Patient has undergone the educational process with this procedure, is aware and fully understands the risks involved: potential damage to any and all body organs including possible bleeding, infection, and nerve injury, allergic reaction and headache. Patient also understands that the procedure will be undertaken in a safe, controlled and monitored setting. Patient recognizes that the benefits may include relief from pain and reduction in the oral use of medications. Patient agreed to proceed. The patient was counseled at length about the risks of grazyna Covid-19 during their perioperative period and any recovery window from their procedure. The patient was made aware that grazyna Covid-19  may worsen their prognosis for recovering from their procedure  and lend to a higher morbidity and/or mortality risk. All material risks, benefits, and reasonable alternatives including postponing the procedure were discussed. The patient does wish to proceed with the procedure at this time. PREP: The patient was placed in the prone position and padded appropriately. The injection site was prepped with chloroprep and draped appropriately. 5ml of 0.5% lidocaine was used to anesthetize the skin and subcutaneous tissue. PROCEDURE NOTE: A 22 gauge 3.5 inch Pencil-Point needle was then advanced to the Right L4 and L5 neuroforamen using a wide paramedian approach under fluoroscopic guidance. Aspiration was negative for blood, CSF and producing pain.  Contrast Medium: 1 ml of (omnipaque) contrast was then injected and the following was noted: appropriate spread of contrast along the nerve root sheath and adjacent epidural space 5mg Dexamethasone mixed with 1.5 ml of 0.5 % lidocaine was then injected into the nerve root sheath of each of the indicated levels. The needle was withdrawn by the physician and the nurse applied a sterile dressing. The patient tolerated the procedure well. No complications occured. Patient transferred to the recovery room in satisfatory condition. Appropriate written discharge instructions given to the patient.     Qiana Stark MD

## 2022-10-06 NOTE — H&P
Pain Pre-Op H&P Note    Annabelle Florez MD    HPI: Sneha Morales  presents with back and leg pain.     Past Medical History:   Diagnosis Date    Asthma     Cancer ([address]     Thyroid    Controlled type 2 diabetes mellitus without complication, without long-term current use of insulin (HCC)     Thyroid cancer [facility])        Past Surgical History:   Procedure Laterality Date     SECTION      NERVE BLOCK Right 2021    : LUMBAR TRANSFORAMINAL L4-5 (Right )    PAIN MANAGEMENT PROCEDURE Right 2021    LUMBAR TRANSFORAMINAL L4-5 performed by Annabelle Florez MD at [address] [place] [address]  2021    LUMBAR TRANSFORAMINAL L4/5 - Right    PAIN MANAGEMENT PROCEDURE Right 2021    LUMBAR TRANSFORAMINAL L4/5 performed by Annabelle Florez MD at [address] [place] [address] Right 2021    RIGHT L4/5 LUMBAR TRANSFORAMINAL - Right    PAIN MANAGEMENT PROCEDURE Right 2021    RIGHT L4/5 LUMBAR TRANSFORAMINAL performed by Annabelle Florez MD at [address] [place] [address] 2017       Family History   Problem Relation Age of Onset    Other Mother         MTHFR Mutation    Diabetes Father         IDDM    Stroke Father     Heart Disease Father     Heart Attack Father     Arrhythmia Father     Arthritis Maternal Grandmother     Cancer Maternal Grandmother     Heart Failure Maternal Grandmother     Neuropathy Maternal Grandmother     Parkinsonism Maternal Grandfather     Arthritis Maternal Grandfather     Cancer Maternal Grandfather     High Blood Pressure Paternal Grandmother     Prostate Cancer Paternal Grandfather     Cancer Paternal Grandfather        No Known Allergies      Current Outpatient Medications:     ibuprofen (ADVIL;MOTRIN) 800 MG tablet, TAKE 1 TABLET BY MOUTH EVERY EIGHT HOURS AS NEEDED FOR PAIN, Disp: 90 tablet, Rfl: 0    Magnesium Oxide 500 MG CAPS, Take 1 tablet by mouth 4 times daily (before meals and nightly), Disp: , Rfl:     DHEA 10 MG TABS, Take 5 mg by mouth every morning (before breakfast) EFRA if oK with pharmacist, Disp: , Rfl:     Barberry-Oreg Grape-Goldenseal (BERBERINE COMPLEX PO), Take 500 mg by mouth daily (before dinner) Amazing Formulas if OK with pharmacist, Disp: , Rfl:     LEVEMIR FLEXTOUCH 100 UNIT/ML injection pen, INJECT 25 UNITS SUBCUTANEOUSLY IN THE MORNING, Disp: , Rfl:     HUMALOG KWIKPEN 100 UNIT/ML SOPN, , Disp: , Rfl:     TRUEPLUS PEN NEEDLES 32G X 4 MM MISC, USE THREE TIMES A DAY, Disp: , Rfl:     triamcinolone (ARISTOCORT) 0.5 % cream, APPLY A THIN LAYER TO THE AFFECTED AREA TWICE DAILY, Disp: , Rfl:     nystatin (MYCOSTATIN) 028085 UNIT/GM powder, APPLY TO THE AFFECTED AREA TWICE DAILY AS NEEDED, Disp: , Rfl:     gabapentin (NEURONTIN) 300 MG capsule, , Disp: , Rfl:     FEROSUL 325 (65 Fe) MG tablet, , Disp: , Rfl:     ISIBLOOM 0.15-30 MG-MCG per tablet, TAKE 1 TABLET BY MOUTH EVERY DAY CONTINUOUSLY, Disp: , Rfl:     levothyroxine (SYNTHROID) 125 MCG tablet, Take 125 mcg by mouth daily , Disp: , Rfl:     omeprazole (PRILOSEC) 20 MG delayed release capsule, TAKE 1 CAPSULE BY MOUTH EVERY DAY, Disp: , Rfl:     glipiZIDE (GLUCOTROL) 10 MG tablet, , Disp: , Rfl:     metFORMIN (GLUCOPHAGE) 500 MG tablet, Take 500 mg by mouth 3 times daily, Disp: , Rfl:     liothyronine (CYTOMEL) 5 MCG tablet, Take 5 mcg by mouth 2 times daily, Disp: , Rfl:     Cetirizine HCl (ZYRTEC ALLERGY PO), Take by mouth, Disp: , Rfl:     B Complex-Folic Acid (G-144 BALANCED TR PO), Take 1 tablet by mouth 3 times daily (before meals), Disp: , Rfl:     Calcium-Magnesium-Vitamin D 909-322-261 MG-MG-UNIT TABS, Take 1 tablet by mouth 3 times daily (before meals), Disp: , Rfl:   No current facility-administered medications for this encounter.     Facility-Administered Medications Ordered in Other Encounters:     thyrotropin dajuan (THYROGEN) injection 0.9 mg, 0.9 mg, IntraMUSCular, Once, Arthur Rubin MD    Social History     Tobacco Use Smoking status: Never    Smokeless tobacco: Never   Substance Use Topics    Alcohol use: No       Review of Systems:   Focused review of systems was performed, and negative as pertinent to diagnosis, except as stated in HPI. Physical Exam  Constitutional:       Appearance: Normal appearance. Pulmonary:      Effort: Pulmonary effort is normal.   Neurological:      Mental Status: alert. Psychiatric:         Attention and Perception: Attention and perception normal.         Mood and Affect: Mood and affect normal.   Cardiovascular:      Rate: Normal rate. ASA: 3          Mallampati: 2       Patient's current physical status, medications, medical history, and HPI have been reviewed and updated as appropriate on this date: 10/06/22    Risk/Benefit(s): The risks, benefits, alternatives, and potential complications have been discussed with the patient/family and informed consent has been obtained for the procedure/sedation.     Diagnosis:   radiculopathy      Plan: epidural        Sully Hughes MD

## 2022-10-16 DIAGNOSIS — M54.50 CHRONIC LOW BACK PAIN, UNSPECIFIED BACK PAIN LATERALITY, UNSPECIFIED WHETHER SCIATICA PRESENT: ICD-10-CM

## 2022-10-16 DIAGNOSIS — G89.29 CHRONIC LOW BACK PAIN, UNSPECIFIED BACK PAIN LATERALITY, UNSPECIFIED WHETHER SCIATICA PRESENT: ICD-10-CM

## 2022-10-17 RX ORDER — IBUPROFEN 800 MG/1
TABLET ORAL
Qty: 90 TABLET | Refills: 0 | Status: SHIPPED | OUTPATIENT
Start: 2022-10-17

## 2022-10-20 DIAGNOSIS — M54.50 CHRONIC LOW BACK PAIN, UNSPECIFIED BACK PAIN LATERALITY, UNSPECIFIED WHETHER SCIATICA PRESENT: ICD-10-CM

## 2022-10-20 DIAGNOSIS — G89.29 CHRONIC LOW BACK PAIN, UNSPECIFIED BACK PAIN LATERALITY, UNSPECIFIED WHETHER SCIATICA PRESENT: ICD-10-CM

## 2022-10-20 RX ORDER — IBUPROFEN 800 MG/1
TABLET ORAL
Qty: 90 TABLET | Refills: 0 | OUTPATIENT
Start: 2022-10-20

## 2022-10-21 RX ORDER — TRIAMCINOLONE ACETONIDE 40 MG/ML
40 INJECTION, SUSPENSION INTRA-ARTICULAR; INTRAMUSCULAR ONCE
Status: COMPLETED | OUTPATIENT
Start: 2022-10-21 | End: 2022-10-21

## 2022-10-21 RX ORDER — LIDOCAINE HYDROCHLORIDE 10 MG/ML
1 INJECTION, SOLUTION INFILTRATION; PERINEURAL ONCE
Status: COMPLETED | OUTPATIENT
Start: 2022-10-21 | End: 2022-10-21

## 2022-10-21 RX ADMIN — LIDOCAINE HYDROCHLORIDE 1 ML: 10 INJECTION, SOLUTION INFILTRATION; PERINEURAL at 09:03

## 2022-10-21 RX ADMIN — TRIAMCINOLONE ACETONIDE 40 MG: 40 INJECTION, SUSPENSION INTRA-ARTICULAR; INTRAMUSCULAR at 09:04

## 2022-10-27 ENCOUNTER — OFFICE VISIT (OUTPATIENT)
Dept: ORTHOPEDIC SURGERY | Age: 42
End: 2022-10-27
Payer: COMMERCIAL

## 2022-10-27 VITALS — BODY MASS INDEX: 27.49 KG/M2 | RESPIRATION RATE: 14 BRPM | WEIGHT: 165 LBS | HEIGHT: 65 IN

## 2022-10-27 DIAGNOSIS — M25.561 ACUTE PAIN OF RIGHT KNEE: Primary | ICD-10-CM

## 2022-10-27 DIAGNOSIS — M23.303 OTHER MENISCUS DERANGEMENTS, UNSPECIFIED MEDIAL MENISCUS, RIGHT KNEE: ICD-10-CM

## 2022-10-27 PROCEDURE — G8428 CUR MEDS NOT DOCUMENT: HCPCS | Performed by: ORTHOPAEDIC SURGERY

## 2022-10-27 PROCEDURE — 1036F TOBACCO NON-USER: CPT | Performed by: ORTHOPAEDIC SURGERY

## 2022-10-27 PROCEDURE — G8419 CALC BMI OUT NRM PARAM NOF/U: HCPCS | Performed by: ORTHOPAEDIC SURGERY

## 2022-10-27 PROCEDURE — G8484 FLU IMMUNIZE NO ADMIN: HCPCS | Performed by: ORTHOPAEDIC SURGERY

## 2022-10-27 PROCEDURE — 99213 OFFICE O/P EST LOW 20 MIN: CPT | Performed by: ORTHOPAEDIC SURGERY

## 2022-10-27 NOTE — PROGRESS NOTES
Patient ID: Yumiko Farah is a 42 y.o. female    Chief Compliant:  No chief complaint on file. Diagnostic imaging:    AP standing bilateral knees lateral right knee normal minimal degenerative changes    Assessment and Plan:  1. Acute pain of right knee    2. Other meniscus derangements, unspecified medial meniscus, right knee        Right medial knee pain along the medial side aggravated with Miryam and palpation along the medial joint line    MRI right knee    Follow up after imaging    HPI:  This is a 42 y.o. female who presents to the clinic today for right knee pain. Patient notes this pain is more present on the medial aspect of her right knee. Review of Systems   All other systems reviewed and are negative.       Past History:    Current Outpatient Medications:     ibuprofen (ADVIL;MOTRIN) 800 MG tablet, TAKE 1 TABLET BY MOUTH EVERY EIGHT HOURS AS NEEDED FOR PAIN, Disp: 90 tablet, Rfl: 0    Magnesium Oxide 500 MG CAPS, Take 1 tablet by mouth 4 times daily (before meals and nightly), Disp: , Rfl:     DHEA 10 MG TABS, Take 5 mg by mouth every morning (before breakfast) EFRA if oK with pharmacist, Disp: , Rfl:     Barberry-Oreg Grape-Goldenseal (BERBERINE COMPLEX PO), Take 500 mg by mouth daily (before dinner) Amazing Formulas if OK with pharmacist, Disp: , Rfl:     LEVEMIR FLEXTOUCH 100 UNIT/ML injection pen, INJECT 25 UNITS SUBCUTANEOUSLY IN THE MORNING, Disp: , Rfl:     HUMALOG KWIKPEN 100 UNIT/ML SOPN, , Disp: , Rfl:     TRUEPLUS PEN NEEDLES 32G X 4 MM MISC, USE THREE TIMES A DAY, Disp: , Rfl:     triamcinolone (ARISTOCORT) 0.5 % cream, APPLY A THIN LAYER TO THE AFFECTED AREA TWICE DAILY, Disp: , Rfl:     nystatin (MYCOSTATIN) 199253 UNIT/GM powder, APPLY TO THE AFFECTED AREA TWICE DAILY AS NEEDED, Disp: , Rfl:     gabapentin (NEURONTIN) 300 MG capsule, , Disp: , Rfl:     FEROSUL 325 (65 Fe) MG tablet, , Disp: , Rfl:     ISIBLOOM 0.15-30 MG-MCG per tablet, TAKE 1 TABLET BY MOUTH EVERY DAY CONTINUOUSLY, Disp: , Rfl:     levothyroxine (SYNTHROID) 125 MCG tablet, Take 125 mcg by mouth daily , Disp: , Rfl:     omeprazole (PRILOSEC) 20 MG delayed release capsule, TAKE 1 CAPSULE BY MOUTH EVERY DAY, Disp: , Rfl:     glipiZIDE (GLUCOTROL) 10 MG tablet, , Disp: , Rfl:     metFORMIN (GLUCOPHAGE) 500 MG tablet, Take 500 mg by mouth 3 times daily, Disp: , Rfl:     liothyronine (CYTOMEL) 5 MCG tablet, Take 5 mcg by mouth 2 times daily, Disp: , Rfl:     Cetirizine HCl (ZYRTEC ALLERGY PO), Take by mouth, Disp: , Rfl:     B Complex-Folic Acid (V-576 BALANCED TR PO), Take 1 tablet by mouth 3 times daily (before meals), Disp: , Rfl:     Calcium-Magnesium-Vitamin D [phone] MG-MG-UNIT TABS, Take 1 tablet by mouth 3 times daily (before meals), Disp: , Rfl:   No Known Allergies  Social History     Socioeconomic History    Marital status: Single     Spouse name: Not on file    Number of children: Not on file    Years of education: Not on file    Highest education level: Not on file   Occupational History    Not on file   Tobacco Use    Smoking status: Never    Smokeless tobacco: Never   Vaping Use    Vaping Use: Never used   Substance and Sexual Activity    Alcohol use: No    Drug use: No    Sexual activity: Yes     Partners: Male   Other Topics Concern    Not on file   Social History Narrative    ** Merged History Encounter **          Social Determinants of Health     Financial Resource Strain: Low Risk     Difficulty of Paying Living Expenses: Not very hard   Food Insecurity: No Food Insecurity    Worried About Running Out of Food in the Last Year: Never true    Ran Out of Food in the Last Year: Never true   Transportation Needs: Not on file   Physical Activity: Insufficiently Active    Days of Exercise per Week: 3 days    Minutes of Exercise per Session: 30 min   Stress: Not on file   Social Connections: Not on file   Intimate Partner Violence: Not At Risk    Fear of Current or Ex-Partner: No    Emotionally Abused: No    Physically Abused: No    Sexually Abused: No   Housing Stability: Not on file     Past Medical History:   Diagnosis Date    Asthma     Cancer ([address]     Thyroid    Controlled type 2 diabetes mellitus without complication, without long-term current use of insulin (HCC)     Th[facility]roid cancer [facility])      Past Surgical History:   Procedure Laterality Date     SECTION      NERVE BLOCK Right 2021    : LUMBAR TRANSFORAMINAL L4-5 (Right )    PAIN MANAGEMENT PROCEDURE Right 2021    LUMBAR TRANSFORAMINAL L4-5 performed by Salena Skiff, MD at [address] [place] [address]  2021    LUMBAR TRANSFORAMINAL L4/5 - Right    PAIN MANAGEMENT PROCEDURE Right 2021    LUMBAR TRANSFORAMINAL L4/5 performed by Salena Skiff, MD at [address] [place] [address] Right 2021    RIGHT L4/5 LUMBAR TRANSFORAMINAL - Right    PAIN MANAGEMENT PROCEDURE Right 2021    RIGHT L4/5 LUMBAR TRANSFORAMINAL performed by Salena Skiff, MD at [address] [place] [address] Bilateral      Family History   Problem Relation Age of Onset    Other Mother         MTHFR Mutation    Diabetes Father         IDDM    Stroke Father     Heart Disease Father     Heart Attack Father     Arrhythmia Father     Arthritis Maternal Grandmother     Cancer Maternal Grandmother     Heart Failure Maternal Grandmother     Neuropathy Maternal Grandmother     Parkinsonism Maternal Grandfather     Arthritis Maternal Grandfather     Cancer Maternal Grandfather     High Blood Pressure Paternal Grandmother     Prostate Cancer Paternal Grandfather     Cancer Paternal Grandfather         Physical Exam:  Vitals signs and nursing note reviewed. Constitutional:       Appearance: well-developed. HENT:      Head: Normocephalic and atraumatic. Nose: Nose normal.   Eyes:      Conjunctiva/sclera: Conjunctivae normal.   Neck:      Musculoskeletal: Normal range of motion and neck supple. Pulmonary:      Effort: Pulmonary effort is normal. No respiratory distress. Musculoskeletal:      Comments: Normal gait     Skin:     General: Skin is warm and dry. Neurological:      Mental Status: Alert and oriented to person, place, and time. Sensory: No sensory deficit. Psychiatric:         Behavior: Behavior normal.         Thought Content: Thought content normal.    Examination right knee normal range of motion excellent stability positive tenderness to palpation along the medial but not the lateral joint line positive aggravation of medial joint pain with Miryam    Provider Attestation:  Ramin Swanson, personally performed the services described in this documentation. All medical record entries made by the scribe were at my direction and in my presence. I have reviewed the chart and discharge instructions and agree that the records reflect my personal performance and is accurate and complete. Roger Lewis MD 10/27/22       Scribe Attestation:  By signing my name below, Solomon Dial, attest that this documentation has been prepared under the direction and in the presence of Dr. Mariel Gardner. Electronically signed: Julieth Macdonald, 10/27/22     Please note that this chart was generated using voice recognition Dragon dictation software. Although every effort was made to ensure the accuracy of this automated transcription, some errors in transcription may have occurred.

## 2022-11-01 ENCOUNTER — OFFICE VISIT (OUTPATIENT)
Dept: PAIN MANAGEMENT | Age: 42
End: 2022-11-01
Payer: COMMERCIAL

## 2022-11-01 VITALS — BODY MASS INDEX: 27.49 KG/M2 | HEIGHT: 65 IN | WEIGHT: 165 LBS

## 2022-11-01 DIAGNOSIS — M47.817 LUMBOSACRAL SPONDYLOSIS WITHOUT MYELOPATHY: Primary | ICD-10-CM

## 2022-11-01 DIAGNOSIS — M54.17 LUMBOSACRAL NEURITIS: ICD-10-CM

## 2022-11-01 PROCEDURE — 1036F TOBACCO NON-USER: CPT | Performed by: NURSE PRACTITIONER

## 2022-11-01 PROCEDURE — G8419 CALC BMI OUT NRM PARAM NOF/U: HCPCS | Performed by: NURSE PRACTITIONER

## 2022-11-01 PROCEDURE — 99213 OFFICE O/P EST LOW 20 MIN: CPT | Performed by: NURSE PRACTITIONER

## 2022-11-01 PROCEDURE — G8427 DOCREV CUR MEDS BY ELIG CLIN: HCPCS | Performed by: NURSE PRACTITIONER

## 2022-11-01 PROCEDURE — G8484 FLU IMMUNIZE NO ADMIN: HCPCS | Performed by: NURSE PRACTITIONER

## 2022-11-01 ASSESSMENT — ENCOUNTER SYMPTOMS
BACK PAIN: 1
SHORTNESS OF BREATH: 0
COUGH: 0
CONSTIPATION: 0

## 2022-11-01 NOTE — PROGRESS NOTES
Chief Complaint   Patient presents with    Back Pain        PMH   Pt here for follow up after TF LESI she reports moderate relief. She states she has had significant relief from lumbar RFA in the past as well. Denies further concerns at this time. Back Pain  This is a chronic problem. The current episode started more than 1 year ago. The problem occurs constantly. The problem is unchanged. The pain is present in the lumbar spine. The quality of the pain is described as aching. The pain radiates to the right foot, right knee and right thigh. The pain is at a severity of 4/10. The pain is moderate. The symptoms are aggravated by bending, position, lying down, twisting, sitting and standing. Stiffness is present All day. Pertinent negatives include no chest pain, fever, headaches, numbness, tingling or weakness. Risk factors include history of cancer. She has tried bed rest, home exercises, heat, ice, walking, NSAIDs and chiropractic manipulation for the symptoms. Patient denies any new neurological symptoms. No bowel or bladder incontinence, no weakness, and no falling.       Past Medical History:   Diagnosis Date    Asthma     Cancer ([facility]     Thyroid    Controlled type 2 diabetes mellitus without complication, without long-term current use of insulin ([facility] [address]     Thyroid cancer [facility])        Past Surgical History:   Procedure Laterality Date     SECTION      NERVE BLOCK Right 2021    : LUMBAR TRANSFORAMINAL L4-5 (Right )    PAIN MANAGEMENT PROCEDURE Right 2021    LUMBAR TRANSFORAMINAL L4-5 performed by Cooper Zuniga MD at [address] [place] [address]  2021    LUMBAR TRANSFORAMINAL L4/5 - Right    PAIN MANAGEMENT PROCEDURE Right 2021    LUMBAR TRANSFORAMINAL L4/5 performed by Cooper Zuniga MD at [address] [place] [address] Right 2021    RIGHT L4/5 LUMBAR TRANSFORAMINAL - Right    PAIN MANAGEMENT PROCEDURE Right 2021 RIGHT L4/5 LUMBAR TRANSFORAMINAL performed by Heladio Lau MD at [address] [facility] Bilateral 2017       No Known Allergies      Current Outpatient Medications:     ibuprofen (ADVIL;MOTRIN) 800 MG tablet, TAKE 1 TABLET BY MOUTH EVERY EIGHT HOURS AS NEEDED FOR PAIN, Disp: 90 tablet, Rfl: 0    Magnesium Oxide 500 MG CAPS, Take 1 tablet by mouth 4 times daily (before meals and nightly), Disp: , Rfl:     DHEA 10 MG TABS, Take 5 mg by mouth every morning (before breakfast) EFRA if oK with pharmacist, Disp: , Rfl:     Barberry-Oreg Grape-Goldenseal (BERBERINE COMPLEX PO), Take 500 mg by mouth daily (before dinner) Amazing Formulas if OK with pharmacist, Disp: , Rfl:     LEVEMIR FLEXTOUCH 100 UNIT/ML injection pen, INJECT 25 UNITS SUBCUTANEOUSLY IN THE MORNING, Disp: , Rfl:     HUMALOG KWIKPEN 100 UNIT/ML SOPN, , Disp: , Rfl:     TRUEPLUS PEN NEEDLES 32G X 4 MM MISC, USE THREE TIMES A DAY, Disp: , Rfl:     triamcinolone (ARISTOCORT) 0.5 % cream, APPLY A THIN LAYER TO THE AFFECTED AREA TWICE DAILY, Disp: , Rfl:     nystatin (MYCOSTATIN) 622199 UNIT/GM powder, APPLY TO THE AFFECTED AREA TWICE DAILY AS NEEDED, Disp: , Rfl:     gabapentin (NEURONTIN) 300 MG capsule, , Disp: , Rfl:     FEROSUL 325 (65 Fe) MG tablet, , Disp: , Rfl:     ISIBLOOM 0.15-30 MG-MCG per tablet, TAKE 1 TABLET BY MOUTH EVERY DAY CONTINUOUSLY, Disp: , Rfl:     levothyroxine (SYNTHROID) 125 MCG tablet, Take 125 mcg by mouth daily , Disp: , Rfl:     omeprazole (PRILOSEC) 20 MG delayed release capsule, TAKE 1 CAPSULE BY MOUTH EVERY DAY, Disp: , Rfl:     glipiZIDE (GLUCOTROL) 10 MG tablet, , Disp: , Rfl:     metFORMIN (GLUCOPHAGE) 500 MG tablet, Take 500 mg by mouth 3 times daily, Disp: , Rfl:     liothyronine (CYTOMEL) 5 MCG tablet, Take 5 mcg by mouth 2 times daily, Disp: , Rfl:     Cetirizine HCl (ZYRTEC ALLERGY PO), Take by mouth, Disp: , Rfl:     B Complex-Folic Acid (B-199 BALANCED TR PO), Take 1 tablet by mouth 3 times daily (before meals), Disp: , Rfl:     Calcium-Magnesium-Vitamin D [phone] MG-MG-UNIT TABS, Take 1 tablet by mouth 3 times daily (before meals), Disp: , Rfl:     Family History   Problem Relation Age of Onset    Other Mother         MTHFR Mutation    Diabetes Father         IDDM    Stroke Father     Heart Disease Father     Heart Attack Father     Arrhythmia Father     Arthritis Maternal Grandmother     Cancer Maternal Grandmother     Heart Failure Maternal Grandmother     Neuropathy Maternal Grandmother     Parkinsonism Maternal Grandfather     Arthritis Maternal Grandfather     Cancer Maternal Grandfather     High Blood Pressure Paternal Grandmother     Prostate Cancer Paternal Grandfather     Cancer Paternal Grandfather        Social History     Socioeconomic History    Marital status: Single     Spouse name: Not on file    Number of children: Not on file    Years of education: Not on file    Highest education level: Not on file   Occupational History    Not on file   Tobacco Use    Smoking status: Never    Smokeless tobacco: Never   Vaping Use    Vaping Use: Never used   Substance and Sexual Activity    Alcohol use: No    Drug use: No    Sexual activity: Yes     Partners: Male   Other Topics Concern    Not on file   Social History Narrative    ** Merged History Encounter **          Social Determinants of Health     Financial Resource Strain: Low Risk     Difficulty of Paying Living Expenses: Not very hard   Food Insecurity: No Food Insecurity    Worried About Running Out of Food in the Last Year: Never true    Ran Out of Food in the Last Year: Never true   Transportation Needs: Not on file   Physical Activity: Insufficiently Active    Days of Exercise per Week: 3 days    Minutes of Exercise per Session: 30 min   Stress: Not on file   Social Connections: Not on file   Intimate Partner Violence: Not At Risk    Fear of Current or Ex-Partner: No    Emotionally Abused: No    Physically Abused: No    Sexually Abused: No   Housing Stability: Not on file       Review of Systems:  Review of Systems   Constitutional: Negative for chills and fever. Cardiovascular:  Negative for chest pain and palpitations. Respiratory:  Negative for cough and shortness of breath. Musculoskeletal:  Positive for back pain. Gastrointestinal:  Negative for constipation. Neurological:  Negative for disturbances in coordination, headaches, loss of balance, numbness, tingling and weakness. Physical Exam:  Ht 5' 5\" (1.651 m)   Wt 165 lb (74.8 kg)   BMI 27.46 kg/m²     Physical Exam  HENT:      Head: Normocephalic. Pulmonary:      Effort: Pulmonary effort is normal.   Musculoskeletal:         General: Normal range of motion. Cervical back: Normal range of motion. Lumbar back: Tenderness present. Skin:     General: Skin is warm and dry. Neurological:      Mental Status: She is alert and oriented to person, place, and time. Record/Diagnostics Review:    FINDINGS:   Cervical spine: There are clips in the neck and previous surgery. No   convincing evidence of acute fracture. No localized prevertebral soft tissue   swelling is seen. Vertebral body heights and disc spaces are maintained. Tiny osteophytes/uncovertebral hypertrophy in the midcervical spine. Lumbar spine: Postop changes left upper quadrant of the abdomen. Mild fecal   stasis in the visualized colon with a nonspecific nonobstructive bowel gas   pattern as visualized. Moderate disc degenerative changes L4-L5 with loss of   disc space height, endplate sclerosis, and osteophytes. Vertebral body   heights are maintained. Pedicles are intact. There is no convincing   evidence of acute fracture. SI joints appear symmetric. Impression   Cervical spine: No acute osseous abnormality.        Lumbar spine: Similar disc degenerative changes L4-L5       Assessment:  Problem List Items Addressed This Visit       Lumbosacral spondylosis without myelopathy - Primary    Lumbosacral neuritis          Treatment Plan: Moderate relief following TF LESI >50% - similar to relief she had with last FAZAL  She verbalizes understanding that she can repeat lumbar RFA if needed as well  She feels pain is well managed at this time  Follow up as needed    I have reviewed the chief complaint and history of present illness (including ROS and PFSH) and vital documentation by my staff and I agree with their documentation and have added where applicable.

## 2022-11-07 ENCOUNTER — HOSPITAL ENCOUNTER (OUTPATIENT)
Dept: MRI IMAGING | Age: 42
Discharge: HOME | End: 2022-11-09
Payer: COMMERCIAL

## 2022-11-07 DIAGNOSIS — M25.561 ACUTE PAIN OF RIGHT KNEE: ICD-10-CM

## 2022-11-07 DIAGNOSIS — M23.303 OTHER MENISCUS DERANGEMENTS, UNSPECIFIED MEDIAL MENISCUS, RIGHT KNEE: ICD-10-CM

## 2022-11-07 PROCEDURE — 73721 MRI JNT OF LWR EXTRE W/O DYE: CPT

## 2022-11-16 DIAGNOSIS — M54.50 CHRONIC LOW BACK PAIN, UNSPECIFIED BACK PAIN LATERALITY, UNSPECIFIED WHETHER SCIATICA PRESENT: ICD-10-CM

## 2022-11-16 DIAGNOSIS — G89.29 CHRONIC LOW BACK PAIN, UNSPECIFIED BACK PAIN LATERALITY, UNSPECIFIED WHETHER SCIATICA PRESENT: ICD-10-CM

## 2022-11-17 RX ORDER — IBUPROFEN 800 MG/1
TABLET ORAL
Qty: 90 TABLET | Refills: 3 | Status: SHIPPED | OUTPATIENT
Start: 2022-11-17

## 2023-01-18 DIAGNOSIS — G89.29 CHRONIC LOW BACK PAIN, UNSPECIFIED BACK PAIN LATERALITY, UNSPECIFIED WHETHER SCIATICA PRESENT: ICD-10-CM

## 2023-01-18 DIAGNOSIS — M54.50 CHRONIC LOW BACK PAIN, UNSPECIFIED BACK PAIN LATERALITY, UNSPECIFIED WHETHER SCIATICA PRESENT: ICD-10-CM

## 2023-01-18 RX ORDER — IBUPROFEN 800 MG/1
TABLET ORAL
Qty: 90 TABLET | Refills: 0 | OUTPATIENT
Start: 2023-01-18

## 2023-01-27 ENCOUNTER — HOSPITAL ENCOUNTER (OUTPATIENT)
Age: 43
Setting detail: SPECIMEN
Discharge: HOME | End: 2023-01-27

## 2023-01-27 ENCOUNTER — OFFICE VISIT (OUTPATIENT)
Dept: PAIN MANAGEMENT | Age: 43
End: 2023-01-27
Payer: COMMERCIAL

## 2023-01-27 VITALS — WEIGHT: 165 LBS | BODY MASS INDEX: 27.49 KG/M2 | HEIGHT: 65 IN

## 2023-01-27 DIAGNOSIS — M54.16 LUMBAR RADICULOPATHY: ICD-10-CM

## 2023-01-27 DIAGNOSIS — G89.29 CHRONIC LOW BACK PAIN, UNSPECIFIED BACK PAIN LATERALITY, UNSPECIFIED WHETHER SCIATICA PRESENT: Primary | ICD-10-CM

## 2023-01-27 DIAGNOSIS — M54.50 CHRONIC LOW BACK PAIN, UNSPECIFIED BACK PAIN LATERALITY, UNSPECIFIED WHETHER SCIATICA PRESENT: Primary | ICD-10-CM

## 2023-01-27 DIAGNOSIS — M47.817 LUMBOSACRAL SPONDYLOSIS WITHOUT MYELOPATHY: ICD-10-CM

## 2023-01-27 LAB
ESTIMATED AVERAGE GLUCOSE: 140 MG/DL
HBA1C MFR BLD: 6.5 % (ref 4–6)
IRON SATURATION: 40 % (ref 20–55)
IRON: 138 UG/DL (ref 37–145)
T3 FREE: 3.54 PG/ML (ref 2.02–4.43)
THYROXINE, FREE: 1.54 NG/DL (ref 0.93–1.7)
TOTAL IRON BINDING CAPACITY: 347 UG/DL (ref 250–450)
TSH SERPL DL<=0.05 MIU/L-ACNC: 0.29 UIU/ML (ref 0.3–5)
UNSATURATED IRON BINDING CAPACITY: 209 UG/DL (ref 112–347)
VITAMIN D 25-HYDROXY: 51.9 NG/ML

## 2023-01-27 PROCEDURE — G8427 DOCREV CUR MEDS BY ELIG CLIN: HCPCS | Performed by: NURSE PRACTITIONER

## 2023-01-27 PROCEDURE — G8484 FLU IMMUNIZE NO ADMIN: HCPCS | Performed by: NURSE PRACTITIONER

## 2023-01-27 PROCEDURE — 99214 OFFICE O/P EST MOD 30 MIN: CPT | Performed by: NURSE PRACTITIONER

## 2023-01-27 PROCEDURE — G8419 CALC BMI OUT NRM PARAM NOF/U: HCPCS | Performed by: NURSE PRACTITIONER

## 2023-01-27 PROCEDURE — 1036F TOBACCO NON-USER: CPT | Performed by: NURSE PRACTITIONER

## 2023-01-27 RX ORDER — DULAGLUTIDE 0.75 MG/.5ML
INJECTION, SOLUTION SUBCUTANEOUS
COMMUNITY
Start: 2023-01-12

## 2023-01-27 ASSESSMENT — ENCOUNTER SYMPTOMS
COUGH: 0
CONSTIPATION: 0
BOWEL INCONTINENCE: 0
BACK PAIN: 1
SHORTNESS OF BREATH: 0

## 2023-01-27 ASSESSMENT — PATIENT HEALTH QUESTIONNAIRE - PHQ9
SUM OF ALL RESPONSES TO PHQ QUESTIONS 1-9: 0
SUM OF ALL RESPONSES TO PHQ9 QUESTIONS 1 & 2: 0
1. LITTLE INTEREST OR PLEASURE IN DOING THINGS: 0
2. FEELING DOWN, DEPRESSED OR HOPELESS: 0
SUM OF ALL RESPONSES TO PHQ QUESTIONS 1-9: 0

## 2023-01-27 NOTE — PROGRESS NOTES
Chief Complaint   Patient presents with    Back Pain         [facility] Pt complains of low back pain that radiates down the right leg to the foot. XR lumbar spine 2022 with degenerative changes L4-L5. She has had multiple interventions for her back pain with significant relief. Most recent injection was Right L4 and L5 TF LESI 10/2022 with moderate relief. She has also had lumbar RFA 10/2021 with significant benefit. Axial pain is worst of complaints today and she is requesting to repeat RFA. Back Pain  This is a chronic problem. The current episode started more than 1 year ago. The problem occurs constantly. The problem has been gradually worsening since onset. The pain is present in the lumbar spine. The quality of the pain is described as aching and stabbing. The pain radiates to the right foot. The pain is at a severity of 6/10. The pain is moderate. The pain is The same all the time. The symptoms are aggravated by bending, position, twisting, lying down and coughing. Stiffness is present At night. Pertinent negatives include no bladder incontinence, bowel incontinence, chest pain or fever. Risk factors include history of cancer. She has tried home exercises, bed rest, chiropractic manipulation, walking, heat, ice, muscle relaxant and NSAIDs for the symptoms. The treatment provided moderate relief. Patient denies any new neurological symptoms. No bowel or bladder incontinence, no weakness, and no falling.     Past Medical History:   Diagnosis Date    Asthma     Cancer ([address]     Thyroid    Controlled type 2 diabetes mellitus without complication, without long-term current use of insulin (HCC)     Thyroid cancer [facility])        Past Surgical History:   Procedure Laterality Date     SECTION      NERVE BLOCK Right 2021    : LUMBAR TRANSFORAMINAL L4-5 (Right )    PAIN MANAGEMENT PROCEDURE Right 2021    LUMBAR TRANSFORAMINAL [facility]-5 [facility] PAIN MANAGEMENT PROCEDURE  05/21/2021    LUMBAR TRANSFORAMINAL L4/5 - Right    PAIN MANAGEMENT PROCEDURE Right 5/21/2021    LUMBAR TRANSFORAMINAL L4/5 performed by Gwendloyn Castleman, MD at [facility] Right 07/23/2021    RIGHT L4/5 LUMBAR TRANSFORAMINAL - Right    PAIN MANAGEMENT PROCEDURE Right 7/23/2021    RIGHT L4/5 LUMBAR TRANSFORAMINAL performed by Gwendloyn Castleman, MD at [address] [place] [address] Bilateral 2017       No Known Allergies      Current Outpatient Medications:     ibuprofen (ADVIL;MOTRIN) 800 MG tablet, TAKE 1 TABLET BY MOUTH EVERY EIGHT HOURS AS NEEDED FOR PAIN, Disp: 90 tablet, Rfl: 3    Magnesium Oxide 500 MG CAPS, Take 1 tablet by mouth 4 times daily (before meals and nightly), Disp: , Rfl:     DHEA 10 MG TABS, Take 5 mg by mouth every morning (before breakfast) EFRA if oK with pharmacist, Disp: , Rfl:     Barberry-Oreg Grape-Goldenseal (BERBERINE COMPLEX PO), Take 500 mg by mouth daily (before dinner) Amazing Formulas if OK with pharmacist, Disp: , Rfl:     LEVEMIR FLEXTOUCH 100 UNIT/ML injection pen, INJECT 25 UNITS SUBCUTANEOUSLY IN THE MORNING, Disp: , Rfl:     HUMALOG KWIKPEN 100 UNIT/ML SOPN, , Disp: , Rfl:     TRUEPLUS PEN NEEDLES 32G X 4 MM MISC, USE THREE TIMES A DAY, Disp: , Rfl:     triamcinolone (ARISTOCORT) 0.5 % cream, APPLY A THIN LAYER TO THE AFFECTED AREA TWICE DAILY, Disp: , Rfl:     nystatin (MYCOSTATIN) 031407 UNIT/GM powder, APPLY TO THE AFFECTED AREA TWICE DAILY AS NEEDED, Disp: , Rfl:     gabapentin (NEURONTIN) 300 MG capsule, , Disp: , Rfl:     FEROSUL 325 (65 Fe) MG tablet, , Disp: , Rfl:     ISIBLOOM 0.15-30 MG-MCG per tablet, TAKE 1 TABLET BY MOUTH EVERY DAY CONTINUOUSLY, Disp: , Rfl:     levothyroxine (SYNTHROID) 125 MCG tablet, Take 125 mcg by mouth daily , Disp: , Rfl:     omeprazole (PRILOSEC) 20 MG delayed release capsule, TAKE 1 CAPSULE BY MOUTH EVERY DAY, Disp: , Rfl:     glipiZIDE (GLUCOTROL) 10 MG tablet, , Disp: , Rfl:     metFORMIN (GLUCOPHAGE) 500 MG tablet, Take 500 mg by mouth 3 times daily, Disp: , Rfl:     liothyronine (CYTOMEL) 5 MCG tablet, Take 5 mcg by mouth 2 times daily, Disp: , Rfl:     Cetirizine HCl (ZYRTEC ALLERGY PO), Take by mouth, Disp: , Rfl:     B Complex-Folic Acid (T-867 BALANCED TR PO), Take 1 tablet by mouth 3 times daily (before meals), Disp: , Rfl:     Calcium-Magnesium-Vitamin D [phone] MG-MG-UNIT TABS, Take 1 tablet by mouth 3 times daily (before meals), Disp: , Rfl:     Family History   Problem Relation Age of Onset    Other Mother         MTHFR Mutation    Diabetes Father         IDDM    Stroke Father     Heart Disease Father     Heart Attack Father     Arrhythmia Father     Arthritis Maternal Grandmother     Cancer Maternal Grandmother     Heart Failure Maternal Grandmother     Neuropathy Maternal Grandmother     Parkinsonism Maternal Grandfather     Arthritis Maternal Grandfather     Cancer Maternal Grandfather     High Blood Pressure Paternal Grandmother     Prostate Cancer Paternal Grandfather     Cancer Paternal Grandfather        Social History     Socioeconomic History    Marital status: Single     Spouse name: Not on file    Number of children: Not on file    Years of education: Not on file    Highest education level: Not on file   Occupational History    Not on file   Tobacco Use    Smoking status: Never    Smokeless tobacco: Never   Vaping Use    Vaping Use: Never used   Substance and Sexual Activity    Alcohol use: No    Drug use: No    Sexual activity: Yes     Partners: Male   Other Topics Concern    Not on file   Social History Narrative    ** Merged History Encounter **          Social Determinants of Health     Financial Resource Strain: Low Risk     Difficulty of Paying Living Expenses: Not very hard   Food Insecurity: No Food Insecurity    Worried About Running Out of Food in the Last Year: Never true    Ran Out of Food in the Last Year: Never true   Transportation Needs: Not on file   Physical Activity: Insufficiently Active    Days of Exercise per Week: 3 days    Minutes of Exercise per Session: 30 min   Stress: Not on file   Social Connections: Not on file   Intimate Partner Violence: Not At Risk    Fear of Current or Ex-Partner: No    Emotionally Abused: No    Physically Abused: No    Sexually Abused: No   Housing Stability: Not on file       Review of Systems:  Review of Systems   Constitutional: Negative for chills and fever. Cardiovascular:  Negative for chest pain and palpitations. Respiratory:  Negative for cough and shortness of breath. Musculoskeletal:  Positive for back pain. Gastrointestinal:  Negative for bowel incontinence and constipation. Genitourinary:  Negative for bladder incontinence. Neurological:  Negative for disturbances in coordination and loss of balance. Physical Exam:  Ht 5' 5\" (1.651 m)   Wt 165 lb (74.8 kg)   LMP 12/28/2022 (Approximate)   BMI 27.46 kg/m²     Physical Exam  HENT:      Head: Normocephalic. Pulmonary:      Effort: Pulmonary effort is normal.   Musculoskeletal:         General: Normal range of motion. Cervical back: Normal range of motion. Lumbar back: Tenderness present. Back:    Skin:     General: Skin is warm and dry. Neurological:      Mental Status: She is alert and oriented to person, place, and time. Record/Diagnostics Review:    XR 9/2022   Moderate disc degenerative changes L4-L5 with loss of  disc space height, endplate sclerosis, and osteophytes. Vertebral body  heights are maintained. Pedicles are intact. There is no convincing  evidence of acute fracture. SI joints appear symmetric.         Impression     Lumbar spine: Similar disc degenerative changes L4-L5        Assessment:  Problem List Items Addressed This Visit       Lumbosacral spondylosis without myelopathy    Relevant Orders    FACET JOINT L/S SINGLE     Other Visit Diagnoses       Chronic low back pain, unspecified back pain laterality, unspecified whether sciatica present    -  Primary    Relevant Orders    MRI LUMBAR SPINE WO CONTRAST    FACET JOINT L/S SINGLE    Lumbar radiculopathy        Relevant Orders    MRI LUMBAR SPINE WO CONTRAST               Treatment Plan:  Back pain is worsening, I do believe an MRI of the lumbar spine is indicated to further evaluate pathology and guide treatment plan. Has failed conservative options, significant axial low back pain with degenerative facet changes on MRI  Significant relief following bilateral RFA L4-5 and L5-S1 10/2021 and requesting to repeat. Consider repeating TF LESI in the future as well for radicular pain  Pt is diabetic - last A1C 6.8  Follow up after procedure    I have reviewed the chief complaint and history of present illness (including ROS and PFSH) and vital documentation by my staff and I agree with their documentation and have added where applicable.

## 2023-02-10 ENCOUNTER — HOSPITAL ENCOUNTER (OUTPATIENT)
Dept: MRI IMAGING | Age: 43
End: 2023-02-10
Payer: COMMERCIAL

## 2023-02-10 DIAGNOSIS — M54.16 LUMBAR RADICULOPATHY: ICD-10-CM

## 2023-02-10 DIAGNOSIS — G89.29 CHRONIC LOW BACK PAIN, UNSPECIFIED BACK PAIN LATERALITY, UNSPECIFIED WHETHER SCIATICA PRESENT: ICD-10-CM

## 2023-02-10 DIAGNOSIS — M54.50 CHRONIC LOW BACK PAIN, UNSPECIFIED BACK PAIN LATERALITY, UNSPECIFIED WHETHER SCIATICA PRESENT: ICD-10-CM

## 2023-02-10 PROCEDURE — 72148 MRI LUMBAR SPINE W/O DYE: CPT

## 2023-02-16 ENCOUNTER — HOSPITAL ENCOUNTER (OUTPATIENT)
Dept: PAIN MANAGEMENT | Facility: CLINIC | Age: 43
Discharge: HOME | End: 2023-02-16
Payer: COMMERCIAL

## 2023-02-16 VITALS
DIASTOLIC BLOOD PRESSURE: 74 MMHG | TEMPERATURE: 98.5 F | OXYGEN SATURATION: 100 % | RESPIRATION RATE: 15 BRPM | HEART RATE: 78 BPM | SYSTOLIC BLOOD PRESSURE: 118 MMHG

## 2023-02-16 DIAGNOSIS — R52 PAIN MANAGEMENT: ICD-10-CM

## 2023-02-16 LAB
GLUCOSE BLD-MCNC: 163 MG/DL (ref 65–105)
HCG, PREGNANCY URINE (POC): NEGATIVE

## 2023-02-16 PROCEDURE — 81025 URINE PREGNANCY TEST: CPT

## 2023-02-16 PROCEDURE — 6360000002 HC RX W HCPCS: Performed by: PAIN MEDICINE

## 2023-02-16 PROCEDURE — 82947 ASSAY GLUCOSE BLOOD QUANT: CPT

## 2023-02-16 PROCEDURE — 64635 DESTROY LUMB/SAC FACET JNT: CPT

## 2023-02-16 PROCEDURE — 2500000003 HC RX 250 WO HCPCS: Performed by: PAIN MEDICINE

## 2023-02-16 PROCEDURE — 64636 DESTROY L/S FACET JNT ADDL: CPT

## 2023-02-16 RX ORDER — BUPIVACAINE HYDROCHLORIDE 2.5 MG/ML
INJECTION, SOLUTION EPIDURAL; INFILTRATION; INTRACAUDAL
Status: COMPLETED | OUTPATIENT
Start: 2023-02-16 | End: 2023-02-16

## 2023-02-16 RX ORDER — MIDAZOLAM HYDROCHLORIDE 2 MG/2ML
INJECTION, SOLUTION INTRAMUSCULAR; INTRAVENOUS
Status: COMPLETED | OUTPATIENT
Start: 2023-02-16 | End: 2023-02-16

## 2023-02-16 RX ADMIN — MIDAZOLAM HYDROCHLORIDE 2 MG: 1 INJECTION, SOLUTION INTRAMUSCULAR; INTRAVENOUS at 14:26

## 2023-02-16 RX ADMIN — BUPIVACAINE HYDROCHLORIDE 5 ML: 2.5 INJECTION, SOLUTION EPIDURAL; INFILTRATION; INTRACAUDAL; PERINEURAL at 14:35

## 2023-02-16 ASSESSMENT — PAIN - FUNCTIONAL ASSESSMENT
PAIN_FUNCTIONAL_ASSESSMENT: 0-10
PAIN_FUNCTIONAL_ASSESSMENT: 0-10

## 2023-02-16 ASSESSMENT — PAIN DESCRIPTION - DESCRIPTORS: DESCRIPTORS: ACHING

## 2023-02-16 NOTE — H&P
Pain Pre-Op H&P Note    Melva Hernández MD    HPI: Sneha Morales  presents with back pain.     Past Medical History:   Diagnosis Date    Asthma     Cancer ([address]     Thyroid    Controlled type 2 diabetes mellitus without complication, without long-term current use of insulin (HCC)     Th[facility]roid cancer [facility])        Past Surgical History:   Procedure Laterality Date     SECTION      NERVE BLOCK Right 2021    : LUMBAR TRANSFORAMINAL L4-5 (Right )    PAIN MANAGEMENT PROCEDURE Right 2021    LUMBAR TRANSFORAMINAL L4-5 performed by Melva Hernández MD at [address] [place] [address]  2021    LUMBAR TRANSFORAMINAL L4/5 - Right    PAIN MANAGEMENT PROCEDURE Right 2021    LUMBAR TRANSFORAMINAL L4/5 performed by Melva Hernández MD at [address] [place] [address] Right 2021    RIGHT L4/5 LUMBAR TRANSFORAMINAL - Right    PAIN MANAGEMENT PROCEDURE Right 2021    RIGHT L4/5 LUMBAR TRANSFORAMINAL performed by Melva Hernández MD at [address] [place] [address] 2017       Family History   Problem Relation Age of Onset    Other Mother         MTHFR Mutation    Diabetes Father         IDDM    Stroke Father     Heart Disease Father     Heart Attack Father     Arrhythmia Father     Arthritis Maternal Grandmother     Cancer Maternal Grandmother     Heart Failure Maternal Grandmother     Neuropathy Maternal Grandmother     Parkinsonism Maternal Grandfather     Arthritis Maternal Grandfather     Cancer Maternal Grandfather     High Blood Pressure Paternal Grandmother     Prostate Cancer Paternal Grandfather     Cancer Paternal Grandfather        No Known Allergies      Current Outpatient Medications:     TRULICITY 4.10 LT/5.9OG SOPN, inject contents of 1 pen subcutaneously once weekly, Disp: , Rfl:     ibuprofen (ADVIL;MOTRIN) 800 MG tablet, TAKE 1 TABLET BY MOUTH EVERY EIGHT HOURS AS NEEDED FOR PAIN, Disp: 90 tablet, Rfl: 3    Magnesium Oxide 500 MG CAPS, Take 1 tablet by mouth 4 times daily (before meals and nightly), Disp: , Rfl:     DHEA 10 MG TABS, Take 5 mg by mouth every morning (before breakfast) EFRA if oK with pharmacist, Disp: , Rfl:     Barberry-Oreg Grape-Goldenseal (BERBERINE COMPLEX PO), Take 500 mg by mouth daily (before dinner) Amazing Formulas if OK with pharmacist, Disp: , Rfl:     LEVEMIR FLEXTOUCH 100 UNIT/ML injection pen, INJECT 25 UNITS SUBCUTANEOUSLY IN THE MORNING, Disp: , Rfl:     HUMALOG KWIKPEN 100 UNIT/ML SOPN, , Disp: , Rfl:     TRUEPLUS PEN NEEDLES 32G X 4 MM MISC, USE THREE TIMES A DAY, Disp: , Rfl:     triamcinolone (ARISTOCORT) 0.5 % cream, APPLY A THIN LAYER TO THE AFFECTED AREA TWICE DAILY, Disp: , Rfl:     nystatin (MYCOSTATIN) [id number] UNIT/GM powder, APPLY TO THE AFFECTED AREA TWICE DAILY AS NEEDED, Disp: , Rfl:     gabapentin (NEURONTIN) 300 MG capsule, , Disp: , Rfl:     FEROSUL 325 (65 Fe) MG tablet, , Disp: , Rfl:     ISIBLOOM 0.15-30 MG-MCG per tablet, TAKE 1 TABLET BY MOUTH EVERY DAY CONTINUOUSLY, Disp: , Rfl:     levothyroxine (SYNTHROID) 125 MCG tablet, Take 125 mcg by mouth daily , Disp: , Rfl:     omeprazole (PRILOSEC) 20 MG delayed release capsule, TAKE 1 CAPSULE BY MOUTH EVERY DAY, Disp: , Rfl:     glipiZIDE (GLUCOTROL) 10 MG tablet, , Disp: , Rfl:     metFORMIN (GLUCOPHAGE) 500 MG tablet, Take 500 mg by mouth 3 times daily, Disp: , Rfl:     liothyronine (CYTOMEL) 5 MCG tablet, Take 5 mcg by mouth 2 times daily, Disp: , Rfl:     Cetirizine HCl (ZYRTEC ALLERGY PO), Take by mouth, Disp: , Rfl:     B Complex-Folic Acid (W-839 BALANCED TR PO), Take 1 tablet by mouth 3 times daily (before meals), Disp: , Rfl:     Calcium-Magnesium-Vitamin D [phone] MG-MG-UNIT TABS, Take 1 tablet by mouth 3 times daily (before meals), Disp: , Rfl:   No current facility-administered medications for this encounter.     Facility-Administered Medications Ordered in Other Encounters:     thyrotropin dajuan (THYROGEN) injection 0.9 mg, 0.9 mg, IntraMUSCular, Once, Irma Alicea MD    Social History     Tobacco Use    Smoking status: Never    Smokeless tobacco: Never   Substance Use Topics    Alcohol use: No       Review of Systems:   Focused review of systems was performed, and negative as pertinent to diagnosis, except as stated in HPI. Physical Exam  Constitutional:       Appearance: Normal appearance. Pulmonary:      Effort: Pulmonary effort is normal.   Neurological:      Mental Status: alert. Psychiatric:         Attention and Perception: Attention and perception normal.         Mood and Affect: Mood and affect normal.   Cardiovascular:      Rate: Normal rate. ASA: 3          Mallampati: 2       Patient's current physical status, medications, medical history, and HPI have been reviewed and updated as appropriate on this date: 02/16/23    Risk/Benefit(s): The risks, benefits, alternatives, and potential complications have been discussed with the patient/family and informed consent has been obtained for the procedure/sedation.     Diagnosis:   spondylosis      Plan: hector Hill MD

## 2023-02-16 NOTE — DISCHARGE INSTRUCTIONS
You have received a sedative/anesthetic therefore you should not consume any alcoholic beverages for 24 hours. Do not drive or operate machinery for 24 hours. Do not take a tub bath for 72 hours after procedure (this includes hot tubs). You may shower, but avoid hot water to injection site. Avoid strenuous activity TODAY especially if you experience dizziness. Remove band-aid the next day. Wash off any residual iodine 24 hours from today. Do not use heat, heating pad, or any other heating device over the injection site for 3 days after the procedure. If you experience pain after your procedure, you may continue with your current pain medication as prescribed. (DO NOT INCREASE YOUR PAIN MEDICATION WITHOUT TALKING TO DOCTOR)  Soreness and pain at injection site is common, may use ice to reduce soreness.     Call Eilssa Siegel at [phone] if you experience:   Fever, chills or temperature over 100    Vomiting, headache, persistent stiff neck, nausea or blurred vision   Difficulty urinating or unable to urinate within 8 hours   Increase in weakness, numbness or loss of function of limbs  Increased redness, swelling or drainage at the injection site

## 2023-02-16 NOTE — OP NOTE
Lumbar Radiofrequency Ablation:  SURGEON: Christopher Gracia MD    PRE-OP DIAGNOSIS: M47.817 (lumbosacral spondylosis), M54.5 (low back pain)    POST-OP DIAGNOSIS: Same. PROCEDURE PERFORMED: Radiofrequency Ablation Medial Branch Nerve at Right L4 - 5 and L5 - S1 facet joint(s). HISTORY AND INDICATIONS: Satisfactory response with previous RFA/ medial branch blocks at the indicated levels. Recurrence of painful symptoms attributed to the above diagnosis. The planned treatment is medically necessary to relieve pain, restore function and or reduce reliance on pain medication. EBL: minimal    CONSENT: Patient has undergone the educational process with this procedure, is aware and fully understands the risks involved: potential damage to any and all body organs including possible bleeding, infection, nerve injury, paralysis, allergic reaction and headache. Patient also understands that the procedure will be undertaken in a safe, controlled and monitored setting. Patient recognizes that the benefits may include relief from pain and reduction in the oral use of medications. Patient agreed to proceed. The patient was counseled at length about the risks of grazyna Covid-19 during their perioperative period and any recovery window from their procedure. The patient was made aware that grazyna Covid-19  may worsen their prognosis for recovering from their procedure  and lend to a higher morbidity and/or mortality risk. All material risks, benefits, and reasonable alternatives including postponing the procedure were discussed. The patient does wish to proceed with the procedure at this time. PROCEDURE NOTE: The patient was taken to the procedure room and placed prone with the appropriate padding and positioning to assure patient comfort and physician access to the procedure site. Time out was performed prior to starting the procedure.  Fluoroscopic evaluation was utilized to target the appropriate treatment areas. The skin was prepped with antiseptic solution and draped sterilely. Then 0.5% lidocaine was used to anesthetize the skin and subcutaneous tissue. Under fluoroscopic guidance 22 gauge x 10cm x 10mm active tip needles were advanced to the medial branch and/or dorsal ramus at the indicated levels to innervate the following facet joints Right L4 - 5 and L5 - S1 . Position confirmed with fluoroscopy. Active tip corresponding at the base of the superior articulating process and/or sacral ala for the lumbar RFA. Aspiration was negative. Motor stimulation checked at 2hz up to 3V and confirmed negative for radicular stimulation. After confirmation of the needle placement the patient received 1 ml of 0.25% marcaine to provide anesthesia. Radiofrequency was delivered to the lumbar region at 80 degrees for a limit of 90 seconds in length with no ill effect. The patient tolerated the procedure well and was transported to the recovery room where the patient was monitored with no complications and with stable vital signs. Patient was discharged with appropriate written discharge instructions. Follow-up discussed.       Merrilee Ormond, MD

## 2023-02-23 ENCOUNTER — HOSPITAL ENCOUNTER (OUTPATIENT)
Dept: PAIN MANAGEMENT | Facility: CLINIC | Age: 43
Discharge: HOME | End: 2023-02-23
Payer: COMMERCIAL

## 2023-02-23 VITALS
HEIGHT: 65 IN | TEMPERATURE: 98.4 F | WEIGHT: 165 LBS | BODY MASS INDEX: 27.49 KG/M2 | RESPIRATION RATE: 21 BRPM | OXYGEN SATURATION: 100 % | DIASTOLIC BLOOD PRESSURE: 65 MMHG | SYSTOLIC BLOOD PRESSURE: 111 MMHG | HEART RATE: 79 BPM

## 2023-02-23 DIAGNOSIS — R52 PAIN MANAGEMENT: ICD-10-CM

## 2023-02-23 LAB — HCG, PREGNANCY URINE (POC): NEGATIVE

## 2023-02-23 PROCEDURE — 6360000002 HC RX W HCPCS: Performed by: PAIN MEDICINE

## 2023-02-23 PROCEDURE — 64635 DESTROY LUMB/SAC FACET JNT: CPT | Performed by: PAIN MEDICINE

## 2023-02-23 PROCEDURE — 81025 URINE PREGNANCY TEST: CPT

## 2023-02-23 PROCEDURE — 64636 DESTROY L/S FACET JNT ADDL: CPT | Performed by: PAIN MEDICINE

## 2023-02-23 PROCEDURE — 2500000003 HC RX 250 WO HCPCS: Performed by: PAIN MEDICINE

## 2023-02-23 RX ORDER — BUPIVACAINE HYDROCHLORIDE 2.5 MG/ML
INJECTION, SOLUTION EPIDURAL; INFILTRATION; INTRACAUDAL
Status: COMPLETED | OUTPATIENT
Start: 2023-02-23 | End: 2023-02-23

## 2023-02-23 RX ORDER — MIDAZOLAM HYDROCHLORIDE 2 MG/2ML
INJECTION, SOLUTION INTRAMUSCULAR; INTRAVENOUS
Status: COMPLETED | OUTPATIENT
Start: 2023-02-23 | End: 2023-02-23

## 2023-02-23 RX ADMIN — BUPIVACAINE HYDROCHLORIDE 3 ML: 2.5 INJECTION, SOLUTION EPIDURAL; INFILTRATION; INTRACAUDAL; PERINEURAL at 15:26

## 2023-02-23 RX ADMIN — MIDAZOLAM HYDROCHLORIDE 2 MG: 1 INJECTION, SOLUTION INTRAMUSCULAR; INTRAVENOUS at 15:25

## 2023-02-23 ASSESSMENT — PAIN - FUNCTIONAL ASSESSMENT
PAIN_FUNCTIONAL_ASSESSMENT: 0-10
PAIN_FUNCTIONAL_ASSESSMENT: PREVENTS OR INTERFERES SOME ACTIVE ACTIVITIES AND ADLS
PAIN_FUNCTIONAL_ASSESSMENT: 0-10

## 2023-02-23 ASSESSMENT — PAIN DESCRIPTION - DESCRIPTORS: DESCRIPTORS: ACHING

## 2023-02-23 NOTE — H&P
Pain Pre-Op H&P Note    Maldonado Ramirez MD    HPI: Sneha Morales  presents with back pain.     Past Medical History:   Diagnosis Date    Asthma     Cancer ([address]     Thyroid    Controlled type 2 diabetes mellitus without complication, without long-term current use of insulin (HCC)     [facility]roid cancer [facility])        Past Surgical History:   Procedure Laterality Date     SECTION      NERVE BLOCK Right 2021    : LUMBAR TRANSFORAMINAL L4-5 (Right )    PAIN MANAGEMENT PROCEDURE Right 2021    LUMBAR TRANSFORAMINAL L4-5 performed by Maldonado Ramirez MD at [address] [place] [address]  2021    LUMBAR TRANSFORAMINAL L4/5 - Right    PAIN MANAGEMENT PROCEDURE Right 2021    LUMBAR TRANSFORAMINAL L4/5 performed by Maldonado Ramirez MD at [address] [place] [address] Right 2021    RIGHT L4/5 LUMBAR TRANSFORAMINAL - Right    PAIN MANAGEMENT PROCEDURE Right 2021    RIGHT L4/5 LUMBAR TRANSFORAMINAL performed by Maldonado Ramirez MD at [address] [place] [address] 2017       Family History   Problem Relation Age of Onset    Other Mother         MTHFR Mutation    Diabetes Father         IDDM    Stroke Father     Heart Disease Father     Heart Attack Father     Arrhythmia Father     Arthritis Maternal Grandmother     Cancer Maternal Grandmother     Heart Failure Maternal Grandmother     Neuropathy Maternal Grandmother     Parkinsonism Maternal Grandfather     Arthritis Maternal Grandfather     Cancer Maternal Grandfather     High Blood Pressure Paternal Grandmother     Prostate Cancer Paternal Grandfather     Cancer Paternal Grandfather        No Known Allergies      Current Outpatient Medications:     TRULICITY 0.04 GY/6.7FS SOPN, inject contents of 1 pen subcutaneously once weekly, Disp: , Rfl:     ibuprofen (ADVIL;MOTRIN) 800 MG tablet, TAKE 1 TABLET BY MOUTH EVERY EIGHT HOURS AS NEEDED FOR PAIN, Disp: 90 tablet, Rfl: 3    Magnesium Oxide 500 MG CAPS, Take 1 tablet by mouth 4 times daily (before meals and nightly), Disp: , Rfl:     DHEA 10 MG TABS, Take 5 mg by mouth every morning (before breakfast) EFRA if oK with pharmacist, Disp: , Rfl:     Barberry-Oreg Grape-Goldenseal (BERBERINE COMPLEX PO), Take 500 mg by mouth daily (before dinner) Amazing Formulas if OK with pharmacist, Disp: , Rfl:     LEVEMIR FLEXTOUCH 100 UNIT/ML injection pen, INJECT 25 UNITS SUBCUTANEOUSLY IN THE MORNING, Disp: , Rfl:     HUMALOG KWIKPEN 100 UNIT/ML SOPN, , Disp: , Rfl:     TRUEPLUS PEN NEEDLES 32G X 4 MM MISC, USE THREE TIMES A DAY, Disp: , Rfl:     triamcinolone (ARISTOCORT) 0.5 % cream, APPLY A THIN LAYER TO THE AFFECTED AREA TWICE DAILY, Disp: , Rfl:     nystatin (MYCOSTATIN) 262224 UNIT/GM powder, APPLY TO THE AFFECTED AREA TWICE DAILY AS NEEDED, Disp: , Rfl:     gabapentin (NEURONTIN) 300 MG capsule, , Disp: , Rfl:     FEROSUL 325 (65 Fe) MG tablet, , Disp: , Rfl:     ISIBLOOM 0.15-30 MG-MCG per tablet, TAKE 1 TABLET BY MOUTH EVERY DAY CONTINUOUSLY, Disp: , Rfl:     levothyroxine (SYNTHROID) 125 MCG tablet, Take 125 mcg by mouth daily , Disp: , Rfl:     omeprazole (PRILOSEC) 20 MG delayed release capsule, TAKE 1 CAPSULE BY MOUTH EVERY DAY, Disp: , Rfl:     glipiZIDE (GLUCOTROL) 10 MG tablet, , Disp: , Rfl:     metFORMIN (GLUCOPHAGE) 500 MG tablet, Take 500 mg by mouth 3 times daily, Disp: , Rfl:     liothyronine (CYTOMEL) 5 MCG tablet, Take 5 mcg by mouth 2 times daily, Disp: , Rfl:     Cetirizine HCl (ZYRTEC ALLERGY PO), Take by mouth, Disp: , Rfl:     B Complex-Folic Acid (W-552 BALANCED TR PO), Take 1 tablet by mouth 3 times daily (before meals), Disp: , Rfl:     Calcium-Magnesium-Vitamin D [phone] MG-MG-UNIT TABS, Take 1 tablet by mouth 3 times daily (before meals), Disp: , Rfl:   No current facility-administered medications for this encounter.     Facility-Administered Medications Ordered in Other Encounters:     thyrotropin dajuan (THYROGEN) injection 0.9 mg, 0.9 mg, IntraMUSCular, Once, Ida Gonsalez MD    Social History     Tobacco Use    Smoking status: Never    Smokeless tobacco: Never   Substance Use Topics    Alcohol use: No       Review of Systems:   Focused review of systems was performed, and negative as pertinent to diagnosis, except as stated in HPI. Physical Exam  Constitutional:       Appearance: Normal appearance. Pulmonary:      Effort: Pulmonary effort is normal.   Neurological:      Mental Status: alert. Psychiatric:         Attention and Perception: Attention and perception normal.         Mood and Affect: Mood and affect normal.   Cardiovascular:      Rate: Normal rate. ASA: 3          Mallampati: 2       Patient's current physical status, medications, medical history, and HPI have been reviewed and updated as appropriate on this date: 02/23/23    Risk/Benefit(s): The risks, benefits, alternatives, and potential complications have been discussed with the patient/family and informed consent has been obtained for the procedure/sedation.     Diagnosis:   spondylosis      Plan: hector Pierce MD

## 2023-02-23 NOTE — DISCHARGE INSTRUCTIONS
You have received a sedative/anesthetic therefore you should not consume any alcoholic beverages for 24 hours. Do not drive or operate machinery for 24 hours. Do not take a tub bath for 72 hours after procedure (this includes hot tubs). You may shower, but avoid hot water to injection site. Avoid strenuous activity TODAY especially if you experience dizziness. Remove band-aid the next day. Wash off any residual iodine 24 hours from today. Do not use heat, heating pad, or any other heating device over the injection site for 3 days after the procedure. If you experience pain after your procedure, you may continue with your current pain medication as prescribed. (DO NOT INCREASE YOUR PAIN MEDICATION WITHOUT TALKING TO DOCTOR)  Soreness and pain at injection site is common, may use ice to reduce soreness.     Call Elissa Siegel at [phone] if you experience:   Fever, chills or temperature over 100    Vomiting, headache, persistent stiff neck, nausea or blurred vision   Difficulty urinating or unable to urinate within 8 hours   Increase in weakness, numbness or loss of function of limbs  Increased redness, swelling or drainage at the injection site

## 2023-02-23 NOTE — OP NOTE
Lumbar Radiofrequency Ablation:  SURGEON: Viridiana Arreola MD    PRE-OP DIAGNOSIS: M47.817 (lumbosacral spondylosis), M54.5 (low back pain)    POST-OP DIAGNOSIS: Same. PROCEDURE PERFORMED: Radiofrequency Ablation Medial Branch Nerve at Left L4 - 5 and L5 - S1 facet joint(s). HISTORY AND INDICATIONS: Satisfactory response with previous RFA/ medial branch blocks at the indicated levels. Recurrence of painful symptoms attributed to the above diagnosis. The planned treatment is medically necessary to relieve pain, restore function and or reduce reliance on pain medication. EBL: minimal    CONSENT: Patient has undergone the educational process with this procedure, is aware and fully understands the risks involved: potential damage to any and all body organs including possible bleeding, infection, nerve injury, paralysis, allergic reaction and headache. Patient also understands that the procedure will be undertaken in a safe, controlled and monitored setting. Patient recognizes that the benefits may include relief from pain and reduction in the oral use of medications. Patient agreed to proceed. The patient was counseled at length about the risks of grazyna Covid-19 during their perioperative period and any recovery window from their procedure. The patient was made aware that grazyna Covid-19  may worsen their prognosis for recovering from their procedure  and lend to a higher morbidity and/or mortality risk. All material risks, benefits, and reasonable alternatives including postponing the procedure were discussed. The patient does wish to proceed with the procedure at this time. PROCEDURE NOTE: The patient was taken to the procedure room and placed prone with the appropriate padding and positioning to assure patient comfort and physician access to the procedure site. Time out was performed prior to starting the procedure.  Fluoroscopic evaluation was utilized to target the appropriate treatment areas. The skin was prepped with antiseptic solution and draped sterilely. Then 0.5% lidocaine was used to anesthetize the skin and subcutaneous tissue. Under fluoroscopic guidance 22 gauge x 10cm x 10mm active tip needles were advanced to the medial branch and/or dorsal ramus at the indicated levels to innervate the following facet joints Left L4 - 5 and L5 - S1 . Position confirmed with fluoroscopy. Active tip corresponding at the base of the superior articulating process and/or sacral ala for the lumbar RFA. Aspiration was negative. Motor stimulation checked at 2hz up to 3V and confirmed negative for radicular stimulation. After confirmation of the needle placement the patient received 1 ml of 0.25% marcaine to provide anesthesia. Radiofrequency was delivered to the lumbar region at 80 degrees for a limit of 90 seconds in length with no ill effect. The patient tolerated the procedure well and was transported to the recovery room where the patient was monitored with no complications and with stable vital signs. Patient was discharged with appropriate written discharge instructions. Follow-up discussed.       Sawyer Suarez MD

## 2023-02-23 NOTE — H&P
Pain Pre-Op H&P Note    Bernardino Lau MD    HPI: Sneha Morales  presents with back pain.     Past Medical History:   Diagnosis Date    Asthma     Cancer ([address]     Thyroid    Controlled type 2 diabetes mellitus without complication, without long-term current use of insulin (HCC)     Thyroid cancer [facility])        Past Surgical History:   Procedure Laterality Date     SECTION      NERVE BLOCK Right 2021    : LUMBAR TRANSFORAMINAL L4-5 (Right )    PAIN MANAGEMENT PROCEDURE Right 2021    LUMBAR TRANSFORAMINAL L4-5 performed by Bernardino Lau MD at [address] [place] [address]  2021    LUMBAR TRANSFORAMINAL L4/5 - Right    PAIN MANAGEMENT PROCEDURE Right 2021    LUMBAR TRANSFORAMINAL L4/5 performed by Bernardino Lau MD at [address] [place] [address] Right 2021    RIGHT L4/5 LUMBAR TRANSFORAMINAL - Right    PAIN MANAGEMENT PROCEDURE Right 2021    RIGHT L4/5 LUMBAR TRANSFORAMINAL performed by Bernardino Lau MD at [address] [place] [address] Bilateral 2017       Family History   Problem Relation Age of Onset    Other Mother         MTHFR Mutation    Diabetes Father         IDDM    Stroke Father     Heart Disease Father     Heart Attack Father     Arrhythmia Father     Arthritis Maternal Grandmother     Cancer Maternal Grandmother     Heart Failure Maternal Grandmother     Neuropathy Maternal Grandmother     Parkinsonism Maternal Grandfather     Arthritis Maternal Grandfather     Cancer Maternal Grandfather     High Blood Pressure Paternal Grandmother     Prostate Cancer Paternal Grandfather     Cancer Paternal Grandfather        No Known Allergies      Current Outpatient Medications:     TRULICITY 2.62 IJ/9.5HJ SOPN, inject contents of 1 pen subcutaneously once weekly, Disp: , Rfl:     ibuprofen (ADVIL;MOTRIN) 800 MG tablet, TAKE 1 TABLET BY MOUTH EVERY EIGHT HOURS AS NEEDED FOR PAIN, Disp: 90 tablet, Rfl: 3    Magnesium Oxide 500 MG CAPS, Take 1 tablet by mouth 4 times daily (before meals and nightly), Disp: , Rfl:     DHEA 10 MG TABS, Take 5 mg by mouth every morning (before breakfast) EFRA if oK with pharmacist, Disp: , Rfl:     Barberry-Oreg Grape-Goldenseal (BERBERINE COMPLEX PO), Take 500 mg by mouth daily (before dinner) Amazing Formulas if OK with pharmacist, Disp: , Rfl:     LEVEMIR FLEXTOUCH 100 UNIT/ML injection pen, INJECT 25 UNITS SUBCUTANEOUSLY IN THE MORNING, Disp: , Rfl:     HUMALOG KWIKPEN 100 UNIT/ML SOPN, , Disp: , Rfl:     TRUEPLUS PEN NEEDLES 32G X 4 MM MISC, USE THREE TIMES A DAY, Disp: , Rfl:     triamcinolone (ARISTOCORT) 0.5 % cream, APPLY A THIN LAYER TO THE AFFECTED AREA TWICE DAILY, Disp: , Rfl:     nystatin (MYCOSTATIN) 188795 UNIT/GM powder, APPLY TO THE AFFECTED AREA TWICE DAILY AS NEEDED, Disp: , Rfl:     gabapentin (NEURONTIN) 300 MG capsule, , Disp: , Rfl:     FEROSUL 325 (65 Fe) MG tablet, , Disp: , Rfl:     ISIBLOOM 0.15-30 MG-MCG per tablet, TAKE 1 TABLET BY MOUTH EVERY DAY CONTINUOUSLY, Disp: , Rfl:     levothyroxine (SYNTHROID) 125 MCG tablet, Take 125 mcg by mouth daily , Disp: , Rfl:     omeprazole (PRILOSEC) 20 MG delayed release capsule, TAKE 1 CAPSULE BY MOUTH EVERY DAY, Disp: , Rfl:     glipiZIDE (GLUCOTROL) 10 MG tablet, , Disp: , Rfl:     metFORMIN (GLUCOPHAGE) 500 MG tablet, Take 500 mg by mouth 3 times daily, Disp: , Rfl:     liothyronine (CYTOMEL) 5 MCG tablet, Take 5 mcg by mouth 2 times daily, Disp: , Rfl:     Cetirizine HCl (ZYRTEC ALLERGY PO), Take by mouth, Disp: , Rfl:     B Complex-Folic Acid (I-609 BALANCED TR PO), Take 1 tablet by mouth 3 times daily (before meals), Disp: , Rfl:     Calcium-Magnesium-Vitamin D [phone] MG-MG-UNIT TABS, Take 1 tablet by mouth 3 times daily (before meals), Disp: , Rfl:   No current facility-administered medications for this encounter.     Facility-Administered Medications Ordered in Other Encounters:     thyrotropin dajuan (THYROGEN) injection 0.9 mg, 0.9 mg, IntraMUSCular, Once, Wells Boxer, MD    Social History     Tobacco Use    Smoking status: Never    Smokeless tobacco: Never   Substance Use Topics    Alcohol use: No       Review of Systems:   Focused review of systems was performed, and negative as pertinent to diagnosis, except as stated in HPI. Physical Exam  Constitutional:       Appearance: Normal appearance. Pulmonary:      Effort: Pulmonary effort is normal.   Neurological:      Mental Status: alert. Psychiatric:         Attention and Perception: Attention and perception normal.         Mood and Affect: Mood and affect normal.   Cardiovascular:      Rate: Normal rate. ASA: 3          Mallampati: 2       Patient's current physical status, medications, medical history, and HPI have been reviewed and updated as appropriate on this date: 02/23/23    Risk/Benefit(s): The risks, benefits, alternatives, and potential complications have been discussed with the patient/family and informed consent has been obtained for the procedure/sedation.     Diagnosis:   spondylosis      Plan: hector Lau MD

## 2023-03-02 ENCOUNTER — OFFICE VISIT (OUTPATIENT)
Dept: ORTHOPEDIC SURGERY | Age: 43
End: 2023-03-02
Payer: COMMERCIAL

## 2023-03-02 VITALS — WEIGHT: 165 LBS | RESPIRATION RATE: 10 BRPM | BODY MASS INDEX: 27.49 KG/M2 | HEIGHT: 65 IN

## 2023-03-02 DIAGNOSIS — M25.561 ACUTE PAIN OF RIGHT KNEE: ICD-10-CM

## 2023-03-02 DIAGNOSIS — M54.50 CHRONIC LOW BACK PAIN, UNSPECIFIED BACK PAIN LATERALITY, UNSPECIFIED WHETHER SCIATICA PRESENT: ICD-10-CM

## 2023-03-02 DIAGNOSIS — M22.41 CHONDROMALACIA OF RIGHT PATELLA: ICD-10-CM

## 2023-03-02 DIAGNOSIS — G89.29 CHRONIC LOW BACK PAIN, UNSPECIFIED BACK PAIN LATERALITY, UNSPECIFIED WHETHER SCIATICA PRESENT: ICD-10-CM

## 2023-03-02 DIAGNOSIS — M51.36 LUMBAR DEGENERATIVE DISC DISEASE: Primary | ICD-10-CM

## 2023-03-02 PROCEDURE — G8427 DOCREV CUR MEDS BY ELIG CLIN: HCPCS | Performed by: ORTHOPAEDIC SURGERY

## 2023-03-02 PROCEDURE — 1036F TOBACCO NON-USER: CPT | Performed by: ORTHOPAEDIC SURGERY

## 2023-03-02 PROCEDURE — G8419 CALC BMI OUT NRM PARAM NOF/U: HCPCS | Performed by: ORTHOPAEDIC SURGERY

## 2023-03-02 PROCEDURE — 99214 OFFICE O/P EST MOD 30 MIN: CPT | Performed by: ORTHOPAEDIC SURGERY

## 2023-03-02 PROCEDURE — G8484 FLU IMMUNIZE NO ADMIN: HCPCS | Performed by: ORTHOPAEDIC SURGERY

## 2023-03-02 NOTE — PROGRESS NOTES
Patient ID: Tracey Cuevas is a 36 y.o. female    Chief Compliant:  Chief Complaint   Patient presents with    Knee Pain     right        Diagnostic imaging:    MRI lumbar spine from February of this year independently reviewed by myself L4-5 disc base collapse some minor Modic endplate changes and foraminal stenosis    MRI right knee from November is reviewed patient with some chondromalacia of the patella no significant meniscal tears otherwise largely normal    Assessment and Plan:  1. Lumbar degenerative disc disease    2. Acute pain of right knee    3. Chondromalacia of right patella    4. Chronic low back pain, unspecified back pain laterality, unspecified whether sciatica present      Right knee pain mostly secondary to arthritic changes patellofemoral      PT right knee    Continue to follow with pain management for low back -just recently had an RFA; at some point may be a candidate for basivertebral nerve ablation    Follow up 3 months     HPI:  This is a 36 y.o. female who presents to the clinic today for MRI results. Patient with persistent right medial knee pain. She has been seen previously for low back pain. She currently follows with pain management and underwent RFA last week. Review of Systems   All other systems reviewed and are negative.       Past History:    Current Outpatient Medications:     TRULICITY 7.47 XR/7.8XA SOPN, inject contents of 1 pen subcutaneously once weekly, Disp: , Rfl:     ibuprofen (ADVIL;MOTRIN) 800 MG tablet, TAKE 1 TABLET BY MOUTH EVERY EIGHT HOURS AS NEEDED FOR PAIN, Disp: 90 tablet, Rfl: 3    Magnesium Oxide 500 MG CAPS, Take 1 tablet by mouth 4 times daily (before meals and nightly), Disp: , Rfl:     DHEA 10 MG TABS, Take 5 mg by mouth every morning (before breakfast) EFRA if oK with pharmacist, Disp: , Rfl:     Barberry-Oreg Grape-Goldenseal (BERBERINE COMPLEX PO), Take 500 mg by mouth daily (before dinner) Amazing Formulas if OK with pharmacist, Disp: , Rfl: LEVEMIR FLEXTOUCH 100 UNIT/ML injection pen, INJECT 25 UNITS SUBCUTANEOUSLY IN THE MORNING, Disp: , Rfl:     HUMALOG KWIKPEN 100 UNIT/ML SOPN, , Disp: , Rfl:     TRUEPLUS PEN NEEDLES 32G X 4 MM MISC, USE THREE TIMES A DAY, Disp: , Rfl:     triamcinolone (ARISTOCORT) 0.5 % cream, APPLY A THIN LAYER TO THE AFFECTED AREA TWICE DAILY, Disp: , Rfl:     nystatin (MYCOSTATIN) 355617 UNIT/GM powder, APPLY TO THE AFFECTED AREA TWICE DAILY AS NEEDED, Disp: , Rfl:     gabapentin (NEURONTIN) 300 MG capsule, , Disp: , Rfl:     FEROSUL 325 (65 Fe) MG tablet, , Disp: , Rfl:     ISIBLOOM 0.15-30 MG-MCG per tablet, TAKE 1 TABLET BY MOUTH EVERY DAY CONTINUOUSLY, Disp: , Rfl:     levothyroxine (SYNTHROID) 125 MCG tablet, Take 125 mcg by mouth daily , Disp: , Rfl:     omeprazole (PRILOSEC) 20 MG delayed release capsule, TAKE 1 CAPSULE BY MOUTH EVERY DAY, Disp: , Rfl:     glipiZIDE (GLUCOTROL) 10 MG tablet, , Disp: , Rfl:     metFORMIN (GLUCOPHAGE) 500 MG tablet, Take 500 mg by mouth 3 times daily, Disp: , Rfl:     liothyronine (CYTOMEL) 5 MCG tablet, Take 5 mcg by mouth 2 times daily, Disp: , Rfl:     Cetirizine HCl (ZYRTEC ALLERGY PO), Take by mouth, Disp: , Rfl:     B Complex-Folic Acid (S-066 BALANCED TR PO), Take 1 tablet by mouth 3 times daily (before meals), Disp: , Rfl:     Calcium-Magnesium-Vitamin D [phone] MG-MG-UNIT TABS, Take 1 tablet by mouth 3 times daily (before meals), Disp: , Rfl:   No Known Allergies  Social History     Socioeconomic History    Marital status: Single     Spouse name: Not on file    Number of children: Not on file    Years of education: Not on file    Highest education level: Not on file   Occupational History    Not on file   Tobacco Use    Smoking status: Never    Smokeless tobacco: Never   Vaping Use    Vaping Use: Never used   Substance and Sexual Activity    Alcohol use: No    Drug use: No    Sexual activity: Yes     Partners: Male   Other Topics Concern    Not on file   Social History Narrative    ** Merged History Encounter **          Social Determinants of Health     Financial Resource Strain: Low Risk     Difficulty of Paying Living Expenses: Not very hard   Food Insecurity: No Food Insecurity    Worried About Running Out of Food in the Last Year: Never true    Ran Out of Food in the Last Year: Never true   Transportation Needs: Not on file   Physical Activity: Insufficiently Active    Days of Exercise per Week: 3 days    Minutes of Exercise per Session: 30 min   Stress: Not on file   Social Connections: Not on file   Intimate Partner Violence: Not At Risk    Fear of Current or Ex-Partner: No    Emotionally Abused: No    Physically Abused: No    Sexually Abused: No   Housing Stability: Not on file     Past Medical History:   Diagnosis Date    Asthma     Cancer ([facility]     Thyroid    Controlled type 2 diabetes mellitus without complication, without long-term current use of insulin ([facility]     Thyroid cancer ([facility]      Past Surgical History:   Procedure Laterality Date     SECTION      NERVE BLOCK Right 2021    : LUMBAR TRANSFORAMINAL L4-5 (Right )    PAIN MANAGEMENT PROCEDURE Right 2021    LUMBAR TRANSFORAMINAL L4-5 performed by Jose De Jesus Mendoza MD at [address] [place] [address]  2021    LUMBAR TRANSFORAMINAL L4/5 - Right    PAIN MANAGEMENT PROCEDURE Right 2021    LUMBAR TRANSFORAMINAL L4/5 performed by Jose De Jesus Mendoza MD at [address] [place] [address] Right 2021    RIGHT L4/5 LUMBAR TRANSFORAMINAL - Right    PAIN MANAGEMENT PROCEDURE Right 2021    RIGHT L4/5 LUMBAR TRANSFORAMINAL performed by Jose De Jesus Mendoza MD at [address] [place] [address]      Family History   Problem Relation Age of Onset    Other Mother         MTHFR Mutation    Diabetes Father         IDDM    Stroke Father     Heart Disease Father     Heart Attack Father     Arrhythmia Father     Arthritis Maternal Grandmother Cancer Maternal Grandmother     Heart Failure Maternal Grandmother     Neuropathy Maternal Grandmother     Parkinsonism Maternal Grandfather     Arthritis Maternal Grandfather     Cancer Maternal Grandfather     High Blood Pressure Paternal Grandmother     Prostate Cancer Paternal Grandfather     Cancer Paternal Grandfather         Physical Exam:  Vitals signs and nursing note reviewed. Constitutional:       Appearance: well-developed. HENT:      Head: Normocephalic and atraumatic. Nose: Nose normal.   Eyes:      Conjunctiva/sclera: Conjunctivae normal.   Neck:      Musculoskeletal: Normal range of motion and neck supple. Pulmonary:      Effort: Pulmonary effort is normal. No respiratory distress. Musculoskeletal:      Comments: Normal gait     Skin:     General: Skin is warm and dry. Neurological:      Mental Status: Alert and oriented to person, place, and time. Sensory: No sensory deficit. Psychiatric:         Behavior: Behavior normal.         Thought Content: Thought content normal.    Examination right knee normal range of motion no significant knee joint effusion patient with complaints of pain predominately anteriorly and medially    Provider Attestation:  Magan Swanson, personally performed the services described in this documentation. All medical record entries made by the scribe were at my direction and in my presence. I have reviewed the chart and discharge instructions and agree that the records reflect my personal performance and is accurate and complete. Argelia Corbett MD 3/2/23       Scribe Attestation:  By signing my name below, [place], attest that this documentation has been prepared under the direction and in the presence of Dr. Nola Schwab. Electronically signed: Julieth Tavera, 3/2/23     Please note that this chart was generated using voice recognition Dragon dictation software.   Although every effort was made to ensure the accuracy of this automated transcription, some errors in transcription may have occurred.

## 2023-03-10 ENCOUNTER — APPOINTMENT (OUTPATIENT)
Dept: GENERAL RADIOLOGY | Age: 43
End: 2023-03-10
Payer: COMMERCIAL

## 2023-03-10 ENCOUNTER — HOSPITAL ENCOUNTER (EMERGENCY)
Age: 43
Discharge: HOME | End: 2023-03-10
Attending: EMERGENCY MEDICINE
Payer: COMMERCIAL

## 2023-03-10 VITALS
HEIGHT: 65 IN | HEART RATE: 88 BPM | TEMPERATURE: 98.1 F | WEIGHT: 165 LBS | BODY MASS INDEX: 27.49 KG/M2 | OXYGEN SATURATION: 100 % | DIASTOLIC BLOOD PRESSURE: 76 MMHG | RESPIRATION RATE: 16 BRPM | SYSTOLIC BLOOD PRESSURE: 123 MMHG

## 2023-03-10 DIAGNOSIS — J04.0 LARYNGITIS: Primary | ICD-10-CM

## 2023-03-10 LAB
S PYO AG THROAT QL: NEGATIVE
SOURCE: NORMAL

## 2023-03-10 PROCEDURE — 70360 X-RAY EXAM OF NECK: CPT

## 2023-03-10 PROCEDURE — 87880 STREP A ASSAY W/OPTIC: CPT

## 2023-03-10 PROCEDURE — 99284 EMERGENCY DEPT VISIT MOD MDM: CPT

## 2023-03-10 RX ORDER — PREDNISONE 20 MG/1
20 TABLET ORAL DAILY
Qty: 4 TABLET | Refills: 0 | Status: SHIPPED | OUTPATIENT
Start: 2023-03-10 | End: 2023-03-14

## 2023-03-10 ASSESSMENT — PAIN - FUNCTIONAL ASSESSMENT: PAIN_FUNCTIONAL_ASSESSMENT: 0-10

## 2023-03-10 ASSESSMENT — PAIN DESCRIPTION - PAIN TYPE: TYPE: ACUTE PAIN

## 2023-03-10 ASSESSMENT — LIFESTYLE VARIABLES: HOW OFTEN DO YOU HAVE A DRINK CONTAINING ALCOHOL: MONTHLY OR LESS

## 2023-03-10 ASSESSMENT — PAIN DESCRIPTION - LOCATION: LOCATION: HEAD;THROAT

## 2023-03-10 ASSESSMENT — PAIN SCALES - GENERAL: PAINLEVEL_OUTOF10: 8

## 2023-03-10 NOTE — ED PROVIDER NOTES
[address]      Pt Name: Zayra Amato  MRN: [id number]  Birthdate 1980  Date of evaluation: 3/10/2023  Provider: Zelda Blake MD    CHIEF COMPLAINT     Chief Complaint   Patient presents with    Cough    Pharyngitis         HISTORY OF PRESENT ILLNESS   (Location/Symptom, Timing/Onset, Context/Setting,Quality, Duration, Modifying Factors, Severity)  Note limiting factors. Zayra Amato is a 36 y.o. female who presents to the emergency department with a 10-day history of URI symptoms. She has tested negative for COVID and flu recently. She comes in because she reports loss of voice and painful swallowing. He does not report fever or chills. Patient is diabetic and takes oral hypoglycemics as well as insulin. She states her blood glucose levels are fairly well controlled. She has a past history of thyroid cancer for which she has undergone surgery. The history is provided by the patient. Nursing Notes werereviewed. REVIEW OF SYSTEMS    (2-9 systems for level 4, 10 or more for level 5)     Review of Systems   All other systems reviewed and are negative. Except as noted above the remainder of the review of systems was reviewed and negative.        PAST MEDICAL HISTORY     Past Medical History:   Diagnosis Date    Asthma     Cancer ([address]     Thyroid    Controlled type 2 diabetes mellitus without complication, without long-term current use of insulin ([address]     Thyroid cancer ([address]          SURGICALHISTORY       Past Surgical History:   Procedure Laterality Date     SECTION      NERVE BLOCK Right 2021    : LUMBAR TRANSFORAMINAL L4-5 (Right )    PAIN MANAGEMENT PROCEDURE Right 2021    LUMBAR TRANSFORAMINAL L4-5 performed by Wyatt Davenport MD at [address] [place] [address]  2021    LUMBAR TRANSFORAMINAL L4/5 - Right    PAIN MANAGEMENT PROCEDURE Right 2021    LUMBAR TRANSFORAMINAL L4/5 performed by Melva Hernández MD at [facility] Right 07/23/2021    RIGHT L4/5 LUMBAR TRANSFORAMINAL - Right    PAIN MANAGEMENT PROCEDURE Right 7/23/2021    RIGHT L4/5 LUMBAR TRANSFORAMINAL performed by Melva Hernández MD at [facility] Bilateral 2017         CURRENT MEDICATIONS       Discharge Medication List as of 3/10/2023 10:45 AM        CONTINUE these medications which have NOT CHANGED    Details   TRULICITY 2.07 LJ/3.0AL SOPN inject contents of 1 pen subcutaneously once weekly, DAWHistorical Med      ibuprofen (ADVIL;MOTRIN) 800 MG tablet TAKE 1 TABLET BY MOUTH EVERY EIGHT HOURS AS NEEDED FOR PAIN, Disp-90 tablet, R-3Normal      Magnesium Oxide 500 MG CAPS Take 1 tablet by mouth 4 times daily (before meals and nightly)Historical Med      DHEA 10 MG TABS Take 5 mg by mouth every morning (before breakfast) EFRA if oK with pharmacistHistorical Med      Barberry-Oreg Grape-Goldenseal (BERBERINE COMPLEX PO) Take 500 mg by mouth daily (before dinner) Amazing Formulas if OK with pharmacistHistorical Med      LEVEMIR FLEXTOUCH 100 UNIT/ML injection pen INJECT 25 UNITS SUBCUTANEOUSLY IN THE MORNING, DAWHistorical Med      HUMALOG KWIKPEN 100 UNIT/ML SOPN DAWHistorical Med      TRUEPLUS PEN NEEDLES 32G X 4 MM MISC USE THREE TIMES A DAY, DAWHistorical Med      triamcinolone (ARISTOCORT) 0.5 % cream APPLY A THIN LAYER TO THE AFFECTED AREA TWICE DAILY, Historical Med      nystatin (MYCOSTATIN) 559154 UNIT/GM powder APPLY TO THE AFFECTED AREA TWICE DAILY AS NEEDED, Historical Med      gabapentin (NEURONTIN) 300 MG capsule Historical Med      FEROSUL 325 (65 Fe) MG tablet DAWHistorical Med      ISIBLOOM 0.15-30 MG-MCG per tablet TAKE 1 TABLET BY MOUTH EVERY DAY CONTINUOUSLY, DAWHistorical Med      levothyroxine (SYNTHROID) 125 MCG tablet Take 125 mcg by mouth daily Historical Med      omeprazole (PRILOSEC) 20 MG delayed release capsule TAKE 1 CAPSULE BY MOUTH EVERY DAYHistorical Med      glipiZIDE (GLUCOTROL) 10 MG tablet Historical Med      metFORMIN (GLUCOPHAGE) 500 MG tablet Take 500 mg by mouth 3 times dailyHistorical Med      liothyronine (CYTOMEL) 5 MCG tablet Take 5 mcg by mouth 2 times dailyHistorical Med      Cetirizine HCl (ZYRTEC ALLERGY PO) Take by mouth      B Complex-Folic Acid (G-895 BALANCED TR PO) Take 1 tablet by mouth 3 times daily (before meals)      Calcium-Magnesium-Vitamin D [phone] MG-MG-UNIT TABS Take 1 tablet by mouth 3 times daily (before meals)             ALLERGIES     Patient has no known allergies.     FAMILY HISTORY       Family History   Problem Relation Age of Onset    Other Mother         MTHFR Mutation    Diabetes Father         IDDM    Stroke Father     Heart Disease Father     Heart Attack Father     Arrhythmia Father     Arthritis Maternal Grandmother     Cancer Maternal Grandmother     Heart Failure Maternal Grandmother     Neuropathy Maternal Grandmother     Parkinsonism Maternal Grandfather     Arthritis Maternal Grandfather     Cancer Maternal Grandfather     High Blood Pressure Paternal Grandmother     Prostate Cancer Paternal Grandfather     Cancer Paternal Grandfather           SOCIAL HISTORY       Social History     Socioeconomic History    Marital status: Single   Tobacco Use    Smoking status: Never    Smokeless tobacco: Never   Vaping Use    Vaping Use: Never used   Substance and Sexual Activity    Alcohol use: No    Drug use: No    Sexual activity: Yes     Partners: Male   Social History Narrative    ** Merged History Encounter **          Social Determinants of Health     Financial Resource Strain: Low Risk     Difficulty of Paying Living Expenses: Not very hard   Food Insecurity: No Food Insecurity    Worried About Running Out of Food in the Last Year: Never true    Ran Out of Food in the Last Year: Never true   Physical Activity: Insufficiently Active    Days of Exercise per Week: 3 days    Minutes of Exercise per Session: 30 min   Intimate Partner Violence: Not At Risk    Fear of Current or Ex-Partner: No    Emotionally Abused: No    Physically Abused: No    Sexually Abused: No       SCREENINGS    Jackson Coma Scale  Eye Opening: Spontaneous  Best Verbal Response: Oriented  Best Motor Response: Obeys commands  Angel Coma Scale Score: 15        PHYSICAL EXAM    (up to 7 for level 4, 8 or more for level 5)     ED Triage Vitals [03/10/23 0924]   BP Temp Temp src Heart Rate Resp SpO2 Height Weight   123/76 98.1 °F (36.7 °C) -- 88 16 100 % 5' 5\" (1.651 m) 165 lb (74.8 kg)       Physical Exam  Vitals reviewed. Constitutional:       General: She is not in acute distress. Appearance: She is not ill-appearing. HENT:      Head: Normocephalic. Comments: HEENT exam is normal to inspection. Nose: No congestion. Mouth/Throat:      Mouth: Mucous membranes are moist. No oral lesions. Pharynx: Uvula midline. No oropharyngeal exudate, posterior oropharyngeal erythema or uvula swelling. Cardiovascular:      Rate and Rhythm: Normal rate and regular rhythm. Pulmonary:      Effort: Pulmonary effort is normal. No respiratory distress. Breath sounds: Normal breath sounds. Abdominal:      Palpations: Abdomen is soft. Musculoskeletal:      Cervical back: Neck supple. Lymphadenopathy:      Cervical: No cervical adenopathy. Skin:     General: Skin is warm and dry. Findings: No erythema. Neurological:      General: No focal deficit present. Mental Status: She is alert and oriented to person, place, and time. DIAGNOSTIC RESULTS     EKG: All EKG's are interpreted by the Emergency Department Physician who either signs orCo-signs this chart in the absence of a cardiologist.    RADIOLOGY:     Interpretation per the Radiologist below, ifavailable at the time of this note:    XR NECK SOFT TISSUE   Preliminary Result   Unremarkable radiographs of the soft tissues of the neck. ED BEDSIDE ULTRASOUND:   Performed by ED Physician - none    LABS:  Labs Reviewed   STREP SCREEN GROUP A THROAT       All other labs were within normal range ornot returned as of this dictation. EMERGENCY DEPARTMENT COURSE and DIFFERENTIAL DIAGNOSIS/MDM:   Vitals:    Vitals:    03/10/23 0924   BP: 123/76   Pulse: 88   Resp: 16   Temp: 98.1 °F (36.7 °C)   SpO2: 100%   Weight: 74.8 kg (165 lb)   Height: 5' 5\" (1.651 m)            Patient has a negative strep screen and soft tissue x-ray of the neck did not reveal any unusual soft tissue swelling. She presents with laryngitis and is placed on a short course of prednisone 20 mg daily for 4 days. She is advised to keep a close eye on her blood glucose levels while taking prednisone. She is also advised to seek follow-up if she does not have complete resolution of symptoms although I do not feel that there is anything to suggest recurrent of her thyroid cancer at this time. CONSULTS:  None    PROCEDURES:  Unlessotherwise noted below, none     Procedures    FINAL IMPRESSION      1.  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    Family history of malignant neoplasm Z80.9    Acquired hypothyroidism E03.9    Malignant neoplasm of thyroid gland ([address] C73    Morbid obesity ([address] E66.01    Papillary thyroid carcinoma ([address] C73    Lumbosacral spondylosis without myelopathy M47.817    Lumbosacral neuritis M54.17           Summation      Patient Course: Discharged    ED Medicationsadministered this visit:  Medications - No data to display    New Prescriptions from this visit:    Discharge Medication List as of 3/10/2023 10:45 AM        START taking these medications    Details   predniSONE (DELTASONE) 20 MG tablet Take 1 tablet by mouth daily for 4 days, Disp-4 tablet, R-0Normal             Follow-up:  Your physician            Final Impression:   1.  Laryngitis               (Please note that portions of this note were completed with a voice recognitionprogram.  Efforts were made to edit the dictations but occasionally words are mis-transcribed.)    Heaven Rankin MD (electronically signed)  Attending Emergency Physician            Heaven Rankin MD  03/10/23 7843

## 2023-03-10 NOTE — DISCHARGE INSTRUCTIONS
Gargle with warm salt water twice a day for 3 to 4 days. Follow-up with your physician for further work-up if symptoms persist past 5 days. Return for worsening symptoms.

## 2023-03-27 ENCOUNTER — HOSPITAL ENCOUNTER (OUTPATIENT)
Age: 43
Setting detail: SPECIMEN
Discharge: HOME | End: 2023-03-27

## 2023-03-27 LAB
ABSOLUTE EOS #: 0.15 K/UL (ref 0–0.44)
ABSOLUTE IMMATURE GRANULOCYTE: <0.03 K/UL (ref 0–0.3)
ABSOLUTE LYMPH #: 2.41 K/UL (ref 1.1–3.7)
ABSOLUTE MONO #: 0.38 K/UL (ref 0.1–1.2)
BASOPHILS # BLD: 1 % (ref 0–2)
BASOPHILS ABSOLUTE: 0.04 K/UL (ref 0–0.2)
EOSINOPHILS RELATIVE PERCENT: 2 % (ref 1–4)
FERRITIN SERPL-MCNC: 62 NG/ML (ref 13–150)
HCT VFR BLD AUTO: 37.1 % (ref 36.3–47.1)
HGB BLD-MCNC: 12 G/DL (ref 11.9–15.1)
IMMATURE GRANULOCYTES: 0 %
IRON SATURATION: 21 % (ref 20–55)
IRON SERPL-MCNC: 68 UG/DL (ref 37–145)
LYMPHOCYTES # BLD: 30 % (ref 24–43)
MCH RBC QN AUTO: 29.6 PG (ref 25.2–33.5)
MCHC RBC AUTO-ENTMCNC: 32.3 G/DL (ref 28.4–34.8)
MCV RBC AUTO: 91.6 FL (ref 82.6–102.9)
MONOCYTES # BLD: 5 % (ref 3–12)
NRBC AUTOMATED: 0 PER 100 WBC
PDW BLD-RTO: 12.8 % (ref 11.8–14.4)
PLATELET # BLD AUTO: 265 K/UL (ref 138–453)
PMV BLD AUTO: 11.6 FL (ref 8.1–13.5)
RBC # BLD: 4.05 M/UL (ref 3.95–5.11)
SEG NEUTROPHILS: 62 % (ref 36–65)
SEGMENTED NEUTROPHILS ABSOLUTE COUNT: 5.02 K/UL (ref 1.5–8.1)
TIBC SERPL-MCNC: 330 UG/DL (ref 250–450)
UNSATURATED IRON BINDING CAPACITY: 262 UG/DL (ref 112–347)
WBC # BLD AUTO: 8 K/UL (ref 3.5–11.3)

## 2023-03-29 DIAGNOSIS — M54.50 CHRONIC LOW BACK PAIN, UNSPECIFIED BACK PAIN LATERALITY, UNSPECIFIED WHETHER SCIATICA PRESENT: ICD-10-CM

## 2023-03-29 DIAGNOSIS — G89.29 CHRONIC LOW BACK PAIN, UNSPECIFIED BACK PAIN LATERALITY, UNSPECIFIED WHETHER SCIATICA PRESENT: ICD-10-CM

## 2023-03-30 NOTE — TELEPHONE ENCOUNTER
Patient requesting refill on Ibuprofen 800 mg for chronic low back pain. Last script given 11/17/22 #90 with 3 refills.

## 2023-03-31 RX ORDER — IBUPROFEN 800 MG/1
TABLET ORAL
Qty: 90 TABLET | Refills: 0 | Status: SHIPPED | OUTPATIENT
Start: 2023-03-31

## 2023-04-22 DIAGNOSIS — G89.29 CHRONIC LOW BACK PAIN, UNSPECIFIED BACK PAIN LATERALITY, UNSPECIFIED WHETHER SCIATICA PRESENT: ICD-10-CM

## 2023-04-22 DIAGNOSIS — M54.50 CHRONIC LOW BACK PAIN, UNSPECIFIED BACK PAIN LATERALITY, UNSPECIFIED WHETHER SCIATICA PRESENT: ICD-10-CM

## 2023-04-24 NOTE — TELEPHONE ENCOUNTER
Patient is requesting refill on Motrin 800 mg for Chronic low back pain, unspecified back pain laterality, unspecified whether sciatica present (M54.50 , G89.29). LRF 3/31/23 #90 with no refills    If you agree I updated script with 3 refills, for a 4 month supply.

## 2023-04-25 RX ORDER — IBUPROFEN 800 MG/1
TABLET ORAL
Qty: 90 TABLET | Refills: 0 | Status: SHIPPED | OUTPATIENT
Start: 2023-04-25

## 2023-05-08 DIAGNOSIS — M54.50 CHRONIC LOW BACK PAIN, UNSPECIFIED BACK PAIN LATERALITY, UNSPECIFIED WHETHER SCIATICA PRESENT: ICD-10-CM

## 2023-05-08 DIAGNOSIS — G89.29 CHRONIC LOW BACK PAIN, UNSPECIFIED BACK PAIN LATERALITY, UNSPECIFIED WHETHER SCIATICA PRESENT: ICD-10-CM

## 2023-05-09 RX ORDER — IBUPROFEN 800 MG/1
TABLET ORAL
Qty: 90 TABLET | Refills: 0 | OUTPATIENT
Start: 2023-05-09

## 2023-05-12 DIAGNOSIS — G89.29 CHRONIC LOW BACK PAIN, UNSPECIFIED BACK PAIN LATERALITY, UNSPECIFIED WHETHER SCIATICA PRESENT: ICD-10-CM

## 2023-05-12 DIAGNOSIS — M54.50 CHRONIC LOW BACK PAIN, UNSPECIFIED BACK PAIN LATERALITY, UNSPECIFIED WHETHER SCIATICA PRESENT: ICD-10-CM

## 2023-05-12 NOTE — TELEPHONE ENCOUNTER
LRF 4/25/23 #90  Diagnosis Association: Chronic low back pain, unspecified back pain laterality, unspecified whether sciatica present (M54.50 , G89.29)

## 2023-05-13 RX ORDER — IBUPROFEN 800 MG/1
TABLET ORAL
Qty: 90 TABLET | Refills: 0 | Status: SHIPPED | OUTPATIENT
Start: 2023-05-13

## 2023-06-27 ENCOUNTER — HOSPITAL ENCOUNTER (OUTPATIENT)
Age: 43
Setting detail: SPECIMEN
Discharge: HOME | End: 2023-06-27

## 2023-06-27 LAB
T3FREE SERPL-MCNC: 3.61 PG/ML (ref 2.02–4.43)
T4 FREE SERPL-MCNC: 1.4 NG/DL (ref 0.9–1.7)
TSH SERPL DL<=0.05 MIU/L-ACNC: 0.31 UIU/ML (ref 0.3–5)

## 2023-06-28 LAB
EST. AVERAGE GLUCOSE BLD GHB EST-MCNC: 140 MG/DL
HBA1C MFR BLD: 6.5 % (ref 4–6)
THYROGLOBULIN: <0.2 NG/ML (ref 0–63.4)

## 2023-06-29 LAB — THYROGLOBULIN AB: 19 IU/ML (ref 0–40)

## 2023-08-31 ENCOUNTER — OFFICE VISIT (OUTPATIENT)
Dept: ORTHOPEDIC SURGERY | Age: 43
End: 2023-08-31
Payer: COMMERCIAL

## 2023-08-31 VITALS — WEIGHT: 165 LBS | RESPIRATION RATE: 14 BRPM | HEIGHT: 65 IN | BODY MASS INDEX: 27.49 KG/M2

## 2023-08-31 DIAGNOSIS — M22.41 CHONDROMALACIA OF RIGHT PATELLA: ICD-10-CM

## 2023-08-31 DIAGNOSIS — M54.50 CHRONIC LOW BACK PAIN, UNSPECIFIED BACK PAIN LATERALITY, UNSPECIFIED WHETHER SCIATICA PRESENT: Primary | ICD-10-CM

## 2023-08-31 DIAGNOSIS — G89.29 CHRONIC LOW BACK PAIN, UNSPECIFIED BACK PAIN LATERALITY, UNSPECIFIED WHETHER SCIATICA PRESENT: Primary | ICD-10-CM

## 2023-08-31 PROCEDURE — 1036F TOBACCO NON-USER: CPT | Performed by: ORTHOPAEDIC SURGERY

## 2023-08-31 PROCEDURE — G8427 DOCREV CUR MEDS BY ELIG CLIN: HCPCS | Performed by: ORTHOPAEDIC SURGERY

## 2023-08-31 PROCEDURE — 99213 OFFICE O/P EST LOW 20 MIN: CPT | Performed by: ORTHOPAEDIC SURGERY

## 2023-08-31 PROCEDURE — G8419 CALC BMI OUT NRM PARAM NOF/U: HCPCS | Performed by: ORTHOPAEDIC SURGERY

## 2023-08-31 NOTE — PROGRESS NOTES
Patient ID: Cele Anaya is a 36 y.o. female    Chief Compliant:  Chief Complaint   Patient presents with    Knee Pain     right    Lower Back Pain        Diagnostic imaging:        Assessment and Plan:  1. Chronic low back pain, unspecified back pain laterality, unspecified whether sciatica present    2. Chondromalacia of right patella      Patient doing better following physical therapy she has been given a home exercise program for her right knee. Patient's low back pain is tolerable she continues to work with pain management    We will have the patient start to get her ibuprofen from her primary care so that labs etc. can be appropriately followed        Follow up prn    HPI:  This is a 36 y.o. female who presents to the clinic today for follow up of low back and right knee pain. Patient followed with PT for his right knee pain with significant improvement. She has completed PT and is doing her home exercises. She continues to follow with pain management for her lower back with improvement of her pain. Patient reports that she has begun to experience right buttock pain. Review of Systems   All other systems reviewed and are negative.       Past History:    Current Outpatient Medications:     ibuprofen (ADVIL;MOTRIN) 800 MG tablet, TAKE 1 TABLET BY MOUTH EVERY 8 HOURS AS NEEDED FOR PAIN, Disp: 90 tablet, Rfl: 0    TRULICITY 0.17 JM/8.1HU SOPN, inject contents of 1 pen subcutaneously once weekly, Disp: , Rfl:     Magnesium Oxide 500 MG CAPS, Take 1 tablet by mouth 4 times daily (before meals and nightly), Disp: , Rfl:     DHEA 10 MG TABS, Take 5 mg by mouth every morning (before breakfast) EFRA if oK with pharmacist, Disp: , Rfl:     Barberry-Oreg Grape-Goldenseal (BERBERINE COMPLEX PO), Take 500 mg by mouth daily (before dinner) Amazing Formulas if OK with pharmacist, Disp: , Rfl:     LEVEMIR FLEXTOUCH 100 UNIT/ML injection pen, INJECT 25 UNITS SUBCUTANEOUSLY IN THE MORNING, Disp: , Rfl:

## 2023-09-05 DIAGNOSIS — G89.29 CHRONIC LOW BACK PAIN, UNSPECIFIED BACK PAIN LATERALITY, UNSPECIFIED WHETHER SCIATICA PRESENT: ICD-10-CM

## 2023-09-05 DIAGNOSIS — M54.50 CHRONIC LOW BACK PAIN, UNSPECIFIED BACK PAIN LATERALITY, UNSPECIFIED WHETHER SCIATICA PRESENT: ICD-10-CM

## 2023-09-05 RX ORDER — IBUPROFEN 800 MG/1
TABLET ORAL
Qty: 90 TABLET | Refills: 0 | Status: SHIPPED | OUTPATIENT
Start: 2023-09-05

## 2023-09-06 DIAGNOSIS — M54.50 CHRONIC LOW BACK PAIN, UNSPECIFIED BACK PAIN LATERALITY, UNSPECIFIED WHETHER SCIATICA PRESENT: ICD-10-CM

## 2023-09-06 DIAGNOSIS — G89.29 CHRONIC LOW BACK PAIN, UNSPECIFIED BACK PAIN LATERALITY, UNSPECIFIED WHETHER SCIATICA PRESENT: ICD-10-CM

## 2023-09-06 RX ORDER — IBUPROFEN 800 MG/1
TABLET ORAL
Qty: 90 TABLET | Refills: 0 | OUTPATIENT
Start: 2023-09-06

## 2023-09-22 ENCOUNTER — OFFICE VISIT (OUTPATIENT)
Dept: PAIN MANAGEMENT | Age: 43
End: 2023-09-22
Payer: COMMERCIAL

## 2023-09-22 VITALS — BODY MASS INDEX: 27.49 KG/M2 | WEIGHT: 165 LBS | HEIGHT: 65 IN

## 2023-09-22 DIAGNOSIS — M47.817 LUMBOSACRAL SPONDYLOSIS WITHOUT MYELOPATHY: Primary | ICD-10-CM

## 2023-09-22 DIAGNOSIS — M46.1 SACROILIITIS (HCC): ICD-10-CM

## 2023-09-22 PROCEDURE — 99213 OFFICE O/P EST LOW 20 MIN: CPT | Performed by: PAIN MEDICINE

## 2023-09-22 PROCEDURE — 1036F TOBACCO NON-USER: CPT | Performed by: PAIN MEDICINE

## 2023-09-22 PROCEDURE — G8427 DOCREV CUR MEDS BY ELIG CLIN: HCPCS | Performed by: PAIN MEDICINE

## 2023-09-22 PROCEDURE — G8419 CALC BMI OUT NRM PARAM NOF/U: HCPCS | Performed by: PAIN MEDICINE

## 2023-09-22 RX ORDER — DULAGLUTIDE 1.5 MG/.5ML
INJECTION, SOLUTION SUBCUTANEOUS
COMMUNITY
Start: 2023-09-19

## 2023-09-22 RX ORDER — LEVOTHYROXINE SODIUM 112 UG/1
112 TABLET ORAL DAILY
COMMUNITY
Start: 2023-09-13

## 2023-09-22 ASSESSMENT — ENCOUNTER SYMPTOMS
BOWEL INCONTINENCE: 0
BACK PAIN: 1

## 2023-10-03 ENCOUNTER — HOSPITAL ENCOUNTER (OUTPATIENT)
Age: 43
Setting detail: SPECIMEN
Discharge: HOME | End: 2023-10-03

## 2023-10-03 LAB
BASOPHILS # BLD: 0.04 K/UL (ref 0–0.2)
BASOPHILS NFR BLD: 1 % (ref 0–2)
EOSINOPHIL # BLD: 0.15 K/UL (ref 0–0.44)
EOSINOPHILS RELATIVE PERCENT: 2 % (ref 1–4)
ERYTHROCYTE [DISTWIDTH] IN BLOOD BY AUTOMATED COUNT: 13.6 % (ref 11.8–14.4)
FERRITIN SERPL-MCNC: 18 NG/ML (ref 13–150)
HCT VFR BLD AUTO: 35.9 % (ref 36.3–47.1)
HGB BLD-MCNC: 11.1 G/DL (ref 11.9–15.1)
IMM GRANULOCYTES # BLD AUTO: 0.03 K/UL (ref 0–0.3)
IMM GRANULOCYTES NFR BLD: 0 %
IRON SATN MFR SERPL: 12 % (ref 20–55)
IRON SERPL-MCNC: 37 UG/DL (ref 37–145)
LYMPHOCYTES NFR BLD: 2.25 K/UL (ref 1.1–3.7)
LYMPHOCYTES RELATIVE PERCENT: 27 % (ref 24–43)
MCH RBC QN AUTO: 28.3 PG (ref 25.2–33.5)
MCHC RBC AUTO-ENTMCNC: 30.9 G/DL (ref 28.4–34.8)
MCV RBC AUTO: 91.6 FL (ref 82.6–102.9)
MONOCYTES NFR BLD: 0.48 K/UL (ref 0.1–1.2)
MONOCYTES NFR BLD: 6 % (ref 3–12)
NEUTROPHILS NFR BLD: 64 % (ref 36–65)
NEUTS SEG NFR BLD: 5.32 K/UL (ref 1.5–8.1)
NRBC BLD-RTO: 0 PER 100 WBC
PLATELET # BLD AUTO: 272 K/UL (ref 138–453)
PMV BLD AUTO: 10.8 FL (ref 8.1–13.5)
RBC # BLD AUTO: 3.92 M/UL (ref 3.95–5.11)
TIBC SERPL-MCNC: 305 UG/DL (ref 250–450)
UNSATURATED IRON BINDING CAPACITY: 268 UG/DL (ref 112–347)
WBC OTHER # BLD: 8.3 K/UL (ref 3.5–11.3)

## 2023-10-11 ENCOUNTER — HOSPITAL ENCOUNTER (OUTPATIENT)
Dept: ULTRASOUND IMAGING | Age: 43
Discharge: HOME | End: 2023-10-13
Attending: INTERNAL MEDICINE
Payer: COMMERCIAL

## 2023-10-11 DIAGNOSIS — C73 THYROID CANCER (HCC): ICD-10-CM

## 2023-10-11 DIAGNOSIS — L65.9 NON-SCARRING HAIR LOSS: ICD-10-CM

## 2023-10-11 PROCEDURE — 76536 US EXAM OF HEAD AND NECK: CPT

## 2023-10-13 LAB
ALBUMIN SERPL-MCNC: 3.8 G/DL (ref 3.5–5.2)
ALBUMIN/GLOB SERPL: 1.3 {RATIO} (ref 1–2.5)
ALP SERPL-CCNC: 57 U/L (ref 35–104)
ALT SERPL-CCNC: 12 U/L (ref 5–33)
ANION GAP SERPL CALCULATED.3IONS-SCNC: 8 MMOL/L (ref 9–17)
AST SERPL-CCNC: 16 U/L
BILIRUB SERPL-MCNC: 0.4 MG/DL (ref 0.3–1.2)
BUN SERPL-MCNC: 13 MG/DL (ref 6–20)
CALCIUM SERPL-MCNC: 8.5 MG/DL (ref 8.6–10.4)
CHLORIDE SERPL-SCNC: 102 MMOL/L (ref 98–107)
CHOLEST SERPL-MCNC: 133 MG/DL
CHOLESTEROL/HDL RATIO: 2.3
CO2 SERPL-SCNC: 24 MMOL/L (ref 20–31)
CREAT SERPL-MCNC: 0.6 MG/DL (ref 0.5–0.9)
CREAT UR-MCNC: 275.7 MG/DL (ref 28–217)
GFR SERPL CREATININE-BSD FRML MDRD: >60 ML/MIN/1.73M2
GLUCOSE SERPL-MCNC: 109 MG/DL (ref 70–99)
HDLC SERPL-MCNC: 58 MG/DL
LDLC SERPL CALC-MCNC: 67 MG/DL (ref 0–130)
MICROALBUMIN UR-MCNC: 13 MG/L
MICROALBUMIN/CREAT UR-RTO: 5 MCG/MG CREAT
POTASSIUM SERPL-SCNC: 4.1 MMOL/L (ref 3.7–5.3)
PROT SERPL-MCNC: 6.7 G/DL (ref 6.4–8.3)
SODIUM SERPL-SCNC: 134 MMOL/L (ref 135–144)
T3FREE SERPL-MCNC: 2.16 PG/ML (ref 2.02–4.43)
T4 FREE SERPL-MCNC: 1.1 NG/DL (ref 0.9–1.7)
TRIGL SERPL-MCNC: 41 MG/DL
TSH SERPL DL<=0.05 MIU/L-ACNC: 2.64 UIU/ML (ref 0.3–5)

## 2023-10-20 LAB — THYROGLOBULIN AB: 18 IU/ML (ref 0–40)

## 2023-11-02 ENCOUNTER — HOSPITAL ENCOUNTER (OUTPATIENT)
Dept: PAIN MANAGEMENT | Facility: CLINIC | Age: 43
Discharge: HOME | End: 2023-11-02
Payer: COMMERCIAL

## 2023-11-02 VITALS
DIASTOLIC BLOOD PRESSURE: 59 MMHG | WEIGHT: 165 LBS | BODY MASS INDEX: 27.49 KG/M2 | RESPIRATION RATE: 17 BRPM | HEIGHT: 65 IN | HEART RATE: 80 BPM | OXYGEN SATURATION: 97 % | SYSTOLIC BLOOD PRESSURE: 116 MMHG | TEMPERATURE: 98.2 F

## 2023-11-02 DIAGNOSIS — R52 PAIN MANAGEMENT: ICD-10-CM

## 2023-11-02 LAB — HCG, PREGNANCY URINE (POC): NEGATIVE

## 2023-11-02 PROCEDURE — 81025 URINE PREGNANCY TEST: CPT

## 2023-11-02 ASSESSMENT — PAIN DESCRIPTION - DESCRIPTORS: DESCRIPTORS: SHOOTING

## 2023-11-02 NOTE — DISCHARGE INSTRUCTIONS

## 2023-11-02 NOTE — PROGRESS NOTES
Procedure cancelled. Pt had recent UTI symptoms. Dr Marilyn Maddox was at bedside discussing with patient who understands. Dr. Pedro Haw office will call patient to reschedule.

## 2023-11-02 NOTE — H&P
medications for this encounter. Facility-Administered Medications Ordered in Other Encounters:     thyrotropin dajuan (THYROGEN) injection 0.9 mg, 0.9 mg, IntraMUSCular, Once, Blake Burns MD    Social History     Tobacco Use    Smoking status: Never    Smokeless tobacco: Never   Substance Use Topics    Alcohol use: No       Review of Systems:   Focused review of systems was performed, and negative as pertinent to diagnosis, except as stated in HPI. Physical Exam  Constitutional:       Appearance: Normal appearance. Pulmonary:      Effort: Pulmonary effort is normal.   Neurological:      Mental Status: alert. Psychiatric:         Attention and Perception: Attention and perception normal.         Mood and Affect: Mood and affect normal.   Cardiovascular:      Rate: Normal rate. ASA: 3          Mallampati: 2       Patient's current physical status, medications, medical history, and HPI have been reviewed and updated as appropriate on this date: 11/02/23    Risk/Benefit(s): The risks, benefits, alternatives, and potential complications have been discussed with the patient/family and informed consent has been obtained for the procedure/sedation. Diagnosis:   Back pain      Plan: Recent UTI symptoms, will reschedule elective pain spinal procedure. Discussed with patient, she understands.         Omar Sherman MD

## 2023-11-09 DIAGNOSIS — G89.29 CHRONIC LOW BACK PAIN, UNSPECIFIED BACK PAIN LATERALITY, UNSPECIFIED WHETHER SCIATICA PRESENT: ICD-10-CM

## 2023-11-09 DIAGNOSIS — M54.50 CHRONIC LOW BACK PAIN, UNSPECIFIED BACK PAIN LATERALITY, UNSPECIFIED WHETHER SCIATICA PRESENT: ICD-10-CM

## 2023-11-13 RX ORDER — IBUPROFEN 800 MG/1
TABLET ORAL
Qty: 90 TABLET | Refills: 0 | OUTPATIENT
Start: 2023-11-13

## 2023-12-12 DIAGNOSIS — G89.29 CHRONIC LOW BACK PAIN, UNSPECIFIED BACK PAIN LATERALITY, UNSPECIFIED WHETHER SCIATICA PRESENT: ICD-10-CM

## 2023-12-12 DIAGNOSIS — M54.50 CHRONIC LOW BACK PAIN, UNSPECIFIED BACK PAIN LATERALITY, UNSPECIFIED WHETHER SCIATICA PRESENT: ICD-10-CM

## 2023-12-13 DIAGNOSIS — M54.50 CHRONIC LOW BACK PAIN, UNSPECIFIED BACK PAIN LATERALITY, UNSPECIFIED WHETHER SCIATICA PRESENT: ICD-10-CM

## 2023-12-13 DIAGNOSIS — G89.29 CHRONIC LOW BACK PAIN, UNSPECIFIED BACK PAIN LATERALITY, UNSPECIFIED WHETHER SCIATICA PRESENT: ICD-10-CM

## 2023-12-13 RX ORDER — IBUPROFEN 800 MG/1
TABLET ORAL
Qty: 90 TABLET | Refills: 0 | Status: SHIPPED | OUTPATIENT
Start: 2023-12-13

## 2023-12-15 RX ORDER — IBUPROFEN 800 MG/1
TABLET ORAL
Qty: 90 TABLET | Refills: 0 | OUTPATIENT
Start: 2023-12-15

## 2024-02-01 DIAGNOSIS — G89.29 CHRONIC LOW BACK PAIN, UNSPECIFIED BACK PAIN LATERALITY, UNSPECIFIED WHETHER SCIATICA PRESENT: ICD-10-CM

## 2024-02-01 DIAGNOSIS — M54.50 CHRONIC LOW BACK PAIN, UNSPECIFIED BACK PAIN LATERALITY, UNSPECIFIED WHETHER SCIATICA PRESENT: ICD-10-CM

## 2024-02-02 RX ORDER — IBUPROFEN 800 MG/1
TABLET ORAL
Qty: 90 TABLET | Refills: 0 | Status: SHIPPED | OUTPATIENT
Start: 2024-02-02

## 2024-03-01 DIAGNOSIS — M54.50 CHRONIC LOW BACK PAIN, UNSPECIFIED BACK PAIN LATERALITY, UNSPECIFIED WHETHER SCIATICA PRESENT: ICD-10-CM

## 2024-03-01 DIAGNOSIS — G89.29 CHRONIC LOW BACK PAIN, UNSPECIFIED BACK PAIN LATERALITY, UNSPECIFIED WHETHER SCIATICA PRESENT: ICD-10-CM

## 2024-03-04 RX ORDER — IBUPROFEN 800 MG/1
TABLET ORAL
Qty: 90 TABLET | Refills: 0 | Status: SHIPPED | OUTPATIENT
Start: 2024-03-04

## 2024-03-11 ENCOUNTER — HOSPITAL ENCOUNTER (OUTPATIENT)
Age: 44
Setting detail: SPECIMEN
Discharge: HOME | End: 2024-03-11

## 2024-03-11 LAB
25(OH)D3 SERPL-MCNC: 25 NG/ML (ref 30–100)
CA-I BLD-SCNC: 1.27 MMOL/L (ref 1.13–1.33)
CALCIUM SERPL-MCNC: 9.2 MG/DL (ref 8.6–10.4)
PTH-INTACT SERPL-MCNC: 30 PG/ML (ref 15–65)

## 2024-04-25 DIAGNOSIS — M54.50 CHRONIC LOW BACK PAIN, UNSPECIFIED BACK PAIN LATERALITY, UNSPECIFIED WHETHER SCIATICA PRESENT: ICD-10-CM

## 2024-04-25 DIAGNOSIS — G89.29 CHRONIC LOW BACK PAIN, UNSPECIFIED BACK PAIN LATERALITY, UNSPECIFIED WHETHER SCIATICA PRESENT: ICD-10-CM

## 2024-04-28 RX ORDER — IBUPROFEN 800 MG/1
TABLET ORAL
Qty: 90 TABLET | Refills: 0 | Status: SHIPPED | OUTPATIENT
Start: 2024-04-28

## 2024-04-29 ENCOUNTER — HOSPITAL ENCOUNTER (OUTPATIENT)
Age: 44
Setting detail: SPECIMEN
Discharge: HOME | End: 2024-04-29

## 2024-04-29 LAB
25(OH)D3 SERPL-MCNC: 50.8 NG/ML (ref 30–100)
T3FREE SERPL-MCNC: 3.2 PG/ML (ref 2–4.4)
T4 FREE SERPL-MCNC: 1.2 NG/DL (ref 0.92–1.68)
TSH SERPL DL<=0.05 MIU/L-ACNC: 2.26 UIU/ML (ref 0.27–4.2)

## 2024-04-30 LAB — THYROGLOBULIN AB: 18 IU/ML (ref 0–40)

## 2024-05-04 LAB — THYROGLOB SERPL-MCNC: <0.5 NG/ML (ref 1.3–31.8)

## 2024-05-13 DIAGNOSIS — G89.29 CHRONIC LOW BACK PAIN, UNSPECIFIED BACK PAIN LATERALITY, UNSPECIFIED WHETHER SCIATICA PRESENT: ICD-10-CM

## 2024-05-13 DIAGNOSIS — M54.50 CHRONIC LOW BACK PAIN, UNSPECIFIED BACK PAIN LATERALITY, UNSPECIFIED WHETHER SCIATICA PRESENT: ICD-10-CM

## 2024-05-15 RX ORDER — IBUPROFEN 800 MG/1
TABLET ORAL
Qty: 90 TABLET | Refills: 0 | Status: SHIPPED | OUTPATIENT
Start: 2024-05-15

## 2024-08-09 DIAGNOSIS — M54.50 CHRONIC LOW BACK PAIN, UNSPECIFIED BACK PAIN LATERALITY, UNSPECIFIED WHETHER SCIATICA PRESENT: ICD-10-CM

## 2024-08-09 DIAGNOSIS — G89.29 CHRONIC LOW BACK PAIN, UNSPECIFIED BACK PAIN LATERALITY, UNSPECIFIED WHETHER SCIATICA PRESENT: ICD-10-CM

## 2024-08-12 RX ORDER — IBUPROFEN 800 MG/1
TABLET ORAL
Qty: 90 TABLET | Refills: 0 | OUTPATIENT
Start: 2024-08-12

## 2024-08-20 DIAGNOSIS — M54.50 CHRONIC LOW BACK PAIN, UNSPECIFIED BACK PAIN LATERALITY, UNSPECIFIED WHETHER SCIATICA PRESENT: ICD-10-CM

## 2024-08-20 DIAGNOSIS — G89.29 CHRONIC LOW BACK PAIN, UNSPECIFIED BACK PAIN LATERALITY, UNSPECIFIED WHETHER SCIATICA PRESENT: ICD-10-CM

## 2024-08-21 RX ORDER — IBUPROFEN 800 MG/1
TABLET, FILM COATED ORAL
Qty: 90 TABLET | Refills: 0 | OUTPATIENT
Start: 2024-08-21

## 2024-08-24 DIAGNOSIS — M54.50 CHRONIC LOW BACK PAIN, UNSPECIFIED BACK PAIN LATERALITY, UNSPECIFIED WHETHER SCIATICA PRESENT: ICD-10-CM

## 2024-08-24 DIAGNOSIS — G89.29 CHRONIC LOW BACK PAIN, UNSPECIFIED BACK PAIN LATERALITY, UNSPECIFIED WHETHER SCIATICA PRESENT: ICD-10-CM

## 2024-08-27 RX ORDER — IBUPROFEN 800 MG/1
TABLET, FILM COATED ORAL
Qty: 90 TABLET | Refills: 0 | OUTPATIENT
Start: 2024-08-27

## 2024-08-29 DIAGNOSIS — G89.29 CHRONIC LOW BACK PAIN, UNSPECIFIED BACK PAIN LATERALITY, UNSPECIFIED WHETHER SCIATICA PRESENT: ICD-10-CM

## 2024-08-29 DIAGNOSIS — M54.50 CHRONIC LOW BACK PAIN, UNSPECIFIED BACK PAIN LATERALITY, UNSPECIFIED WHETHER SCIATICA PRESENT: ICD-10-CM

## 2024-08-30 DIAGNOSIS — G89.29 CHRONIC LOW BACK PAIN, UNSPECIFIED BACK PAIN LATERALITY, UNSPECIFIED WHETHER SCIATICA PRESENT: ICD-10-CM

## 2024-08-30 DIAGNOSIS — M54.50 CHRONIC LOW BACK PAIN, UNSPECIFIED BACK PAIN LATERALITY, UNSPECIFIED WHETHER SCIATICA PRESENT: ICD-10-CM

## 2024-08-30 RX ORDER — IBUPROFEN 800 MG/1
TABLET, FILM COATED ORAL
Qty: 90 TABLET | Refills: 0 | OUTPATIENT
Start: 2024-08-30

## 2024-09-03 RX ORDER — IBUPROFEN 800 MG/1
TABLET, FILM COATED ORAL
Qty: 90 TABLET | Refills: 0 | OUTPATIENT
Start: 2024-09-03

## 2024-09-05 DIAGNOSIS — G89.29 CHRONIC LOW BACK PAIN, UNSPECIFIED BACK PAIN LATERALITY, UNSPECIFIED WHETHER SCIATICA PRESENT: ICD-10-CM

## 2024-09-05 DIAGNOSIS — M54.50 CHRONIC LOW BACK PAIN, UNSPECIFIED BACK PAIN LATERALITY, UNSPECIFIED WHETHER SCIATICA PRESENT: ICD-10-CM

## 2024-09-05 RX ORDER — IBUPROFEN 800 MG/1
TABLET, FILM COATED ORAL
Qty: 90 TABLET | Refills: 0 | OUTPATIENT
Start: 2024-09-05

## 2024-09-22 DIAGNOSIS — G89.29 CHRONIC LOW BACK PAIN, UNSPECIFIED BACK PAIN LATERALITY, UNSPECIFIED WHETHER SCIATICA PRESENT: ICD-10-CM

## 2024-09-22 DIAGNOSIS — M54.50 CHRONIC LOW BACK PAIN, UNSPECIFIED BACK PAIN LATERALITY, UNSPECIFIED WHETHER SCIATICA PRESENT: ICD-10-CM

## 2024-09-25 RX ORDER — IBUPROFEN 800 MG/1
TABLET, FILM COATED ORAL
Qty: 90 TABLET | Refills: 0 | OUTPATIENT
Start: 2024-09-25

## 2024-10-01 DIAGNOSIS — G89.29 CHRONIC LOW BACK PAIN, UNSPECIFIED BACK PAIN LATERALITY, UNSPECIFIED WHETHER SCIATICA PRESENT: ICD-10-CM

## 2024-10-01 DIAGNOSIS — M54.50 CHRONIC LOW BACK PAIN, UNSPECIFIED BACK PAIN LATERALITY, UNSPECIFIED WHETHER SCIATICA PRESENT: ICD-10-CM

## 2024-10-01 RX ORDER — IBUPROFEN 800 MG/1
TABLET, FILM COATED ORAL
Qty: 90 TABLET | Refills: 0 | OUTPATIENT
Start: 2024-10-01

## 2024-10-02 DIAGNOSIS — G89.29 CHRONIC LOW BACK PAIN, UNSPECIFIED BACK PAIN LATERALITY, UNSPECIFIED WHETHER SCIATICA PRESENT: ICD-10-CM

## 2024-10-02 DIAGNOSIS — M54.50 CHRONIC LOW BACK PAIN, UNSPECIFIED BACK PAIN LATERALITY, UNSPECIFIED WHETHER SCIATICA PRESENT: ICD-10-CM

## 2024-10-02 RX ORDER — IBUPROFEN 800 MG/1
TABLET, FILM COATED ORAL
Qty: 90 TABLET | Refills: 0 | OUTPATIENT
Start: 2024-10-02

## 2024-10-02 NOTE — TELEPHONE ENCOUNTER
Last office visit 08/31/2023 for low back  Received duplicate request for Ibuprofen for patient. Script denied again.     [facility] contacted, spoke with Bertha and notified to discontinue requests for medication. Bertha verbalized understanding.

## 2024-11-22 DIAGNOSIS — G89.29 CHRONIC LOW BACK PAIN, UNSPECIFIED BACK PAIN LATERALITY, UNSPECIFIED WHETHER SCIATICA PRESENT: ICD-10-CM

## 2024-11-22 DIAGNOSIS — M54.50 CHRONIC LOW BACK PAIN, UNSPECIFIED BACK PAIN LATERALITY, UNSPECIFIED WHETHER SCIATICA PRESENT: ICD-10-CM

## 2024-11-22 RX ORDER — IBUPROFEN 800 MG/1
800 TABLET, FILM COATED ORAL EVERY 8 HOURS PRN
Qty: 90 TABLET | Refills: 0 | Status: SHIPPED | OUTPATIENT
Start: 2024-11-22

## 2024-12-01 ENCOUNTER — HOSPITAL ENCOUNTER (OUTPATIENT)
Age: 44
Discharge: HOME | End: 2024-12-01
Payer: COMMERCIAL

## 2024-12-01 LAB
25(OH)D3 SERPL-MCNC: 42.3 NG/ML (ref 30–100)
ALBUMIN SERPL-MCNC: 4 G/DL (ref 3.5–5.2)
ALBUMIN/GLOB SERPL: 1.5 {RATIO} (ref 1–2.5)
ALP SERPL-CCNC: 68 U/L (ref 35–104)
ALT SERPL-CCNC: 11 U/L (ref 10–35)
ANION GAP SERPL CALCULATED.3IONS-SCNC: 10 MMOL/L (ref 9–16)
AST SERPL-CCNC: 24 U/L (ref 10–35)
BILIRUB SERPL-MCNC: 0.4 MG/DL (ref 0–1.2)
BUN SERPL-MCNC: 10 MG/DL (ref 6–20)
CALCIUM SERPL-MCNC: 8.5 MG/DL (ref 8.6–10.4)
CHLORIDE SERPL-SCNC: 103 MMOL/L (ref 98–107)
CHOLEST SERPL-MCNC: 168 MG/DL (ref 0–199)
CHOLESTEROL/HDL RATIO: 2.7
CO2 SERPL-SCNC: 25 MMOL/L (ref 20–31)
CREAT SERPL-MCNC: 0.8 MG/DL (ref 0.6–0.9)
CREAT UR-MCNC: 285 MG/DL (ref 28–217)
EST. AVERAGE GLUCOSE BLD GHB EST-MCNC: 180 MG/DL
GFR, ESTIMATED: >90 ML/MIN/1.73M2
GLUCOSE SERPL-MCNC: 174 MG/DL (ref 74–99)
HBA1C MFR BLD: 7.9 % (ref 4–6)
HDLC SERPL-MCNC: 63 MG/DL
LDLC SERPL CALC-MCNC: 97 MG/DL (ref 0–100)
MICROALBUMIN UR-MCNC: <12 MG/L (ref 0–20)
MICROALBUMIN/CREAT UR-RTO: ABNORMAL MCG/MG CREAT (ref 0–25)
POTASSIUM SERPL-SCNC: 4.1 MMOL/L (ref 3.7–5.3)
PROT SERPL-MCNC: 6.7 G/DL (ref 6.6–8.7)
SODIUM SERPL-SCNC: 138 MMOL/L (ref 136–145)
T3FREE SERPL-MCNC: 1.9 PG/ML (ref 2–4.4)
T4 FREE SERPL-MCNC: 0.8 NG/DL (ref 0.92–1.68)
TRIGL SERPL-MCNC: 39 MG/DL
TSH SERPL DL<=0.05 MIU/L-ACNC: 15.5 UIU/ML (ref 0.27–4.2)
VLDLC SERPL CALC-MCNC: 8 MG/DL (ref 1–30)

## 2024-12-01 PROCEDURE — 84439 ASSAY OF FREE THYROXINE: CPT

## 2024-12-01 PROCEDURE — 86800 THYROGLOBULIN ANTIBODY: CPT

## 2024-12-01 PROCEDURE — 84481 FREE ASSAY (FT-3): CPT

## 2024-12-01 PROCEDURE — 83036 HEMOGLOBIN GLYCOSYLATED A1C: CPT

## 2024-12-01 PROCEDURE — 84443 ASSAY THYROID STIM HORMONE: CPT

## 2024-12-01 PROCEDURE — 84432 ASSAY OF THYROGLOBULIN: CPT

## 2024-12-01 PROCEDURE — 82570 ASSAY OF URINE CREATININE: CPT

## 2024-12-01 PROCEDURE — 36415 COLL VENOUS BLD VENIPUNCTURE: CPT

## 2024-12-01 PROCEDURE — 82306 VITAMIN D 25 HYDROXY: CPT

## 2024-12-01 PROCEDURE — 82043 UR ALBUMIN QUANTITATIVE: CPT

## 2024-12-01 PROCEDURE — 80053 COMPREHEN METABOLIC PANEL: CPT

## 2024-12-01 PROCEDURE — 80061 LIPID PANEL: CPT

## 2024-12-03 LAB — THYROGLOBULIN AB: 18 IU/ML (ref 0–40)

## 2025-01-06 ENCOUNTER — HOSPITAL ENCOUNTER (OUTPATIENT)
Age: 45
Setting detail: SPECIMEN
Discharge: HOME | End: 2025-01-06

## 2025-01-06 LAB
CALCIUM SERPL-MCNC: 8.8 MG/DL (ref 8.6–10.4)
T3FREE SERPL-MCNC: 3.96 PG/ML (ref 2–4.4)
T4 FREE SERPL-MCNC: 1.2 NG/DL (ref 0.92–1.68)
TSH SERPL DL<=0.05 MIU/L-ACNC: 5.16 UIU/ML (ref 0.27–4.2)

## 2025-01-19 LAB — THYROGLOB SERPL-MCNC: <0.5 NG/ML (ref 1.3–31.8)

## 2025-02-03 ENCOUNTER — HOSPITAL ENCOUNTER (OUTPATIENT)
Age: 45
Setting detail: SPECIMEN
Discharge: HOME | End: 2025-02-03

## 2025-02-03 LAB
T3FREE SERPL-MCNC: 3.18 PG/ML (ref 2–4.4)
T4 FREE SERPL-MCNC: 1.2 NG/DL (ref 0.92–1.68)
TSH SERPL DL<=0.05 MIU/L-ACNC: 1.45 UIU/ML (ref 0.27–4.2)

## 2025-02-09 DIAGNOSIS — G89.29 CHRONIC LOW BACK PAIN, UNSPECIFIED BACK PAIN LATERALITY, UNSPECIFIED WHETHER SCIATICA PRESENT: ICD-10-CM

## 2025-02-09 DIAGNOSIS — M54.50 CHRONIC LOW BACK PAIN, UNSPECIFIED BACK PAIN LATERALITY, UNSPECIFIED WHETHER SCIATICA PRESENT: ICD-10-CM

## 2025-02-11 RX ORDER — IBUPROFEN 800 MG/1
800 TABLET, FILM COATED ORAL EVERY 8 HOURS PRN
Qty: 90 TABLET | Refills: 0 | Status: SHIPPED | OUTPATIENT
Start: 2025-02-11

## 2025-03-06 ENCOUNTER — HOSPITAL ENCOUNTER (OUTPATIENT)
Dept: MAMMOGRAPHY | Age: 45
Discharge: HOME | End: 2025-03-08
Payer: COMMERCIAL

## 2025-03-06 VITALS — HEIGHT: 65 IN | WEIGHT: 175 LBS | BODY MASS INDEX: 29.16 KG/M2

## 2025-03-06 DIAGNOSIS — Z12.31 VISIT FOR SCREENING MAMMOGRAM: ICD-10-CM

## 2025-03-06 PROCEDURE — 77067 SCR MAMMO BI INCL CAD: CPT

## 2025-03-28 ENCOUNTER — HOSPITAL ENCOUNTER (OUTPATIENT)
Age: 45
Setting detail: SPECIMEN
Discharge: HOME | End: 2025-03-28

## 2025-03-28 LAB
ALBUMIN SERPL-MCNC: 4 G/DL (ref 3.5–5.2)
ALBUMIN/GLOB SERPL: 1.5 {RATIO} (ref 1–2.5)
ALP SERPL-CCNC: 58 U/L (ref 35–104)
ALT SERPL-CCNC: 6 U/L (ref 10–35)
ANION GAP SERPL CALCULATED.3IONS-SCNC: 13 MMOL/L (ref 9–16)
AST SERPL-CCNC: 16 U/L (ref 10–35)
BILIRUB SERPL-MCNC: 0.2 MG/DL (ref 0–1.2)
BUN SERPL-MCNC: 14 MG/DL (ref 6–20)
CALCIUM SERPL-MCNC: 8.7 MG/DL (ref 8.6–10.4)
CHLORIDE SERPL-SCNC: 106 MMOL/L (ref 98–107)
CO2 SERPL-SCNC: 25 MMOL/L (ref 20–31)
CREAT SERPL-MCNC: 0.7 MG/DL (ref 0.6–0.9)
ERYTHROCYTE [DISTWIDTH] IN BLOOD BY AUTOMATED COUNT: 12.9 % (ref 11.8–14.4)
FERRITIN SERPL-MCNC: 52 NG/ML (ref 15–150)
GFR, ESTIMATED: >90 ML/MIN/1.73M2
GLUCOSE SERPL-MCNC: 161 MG/DL (ref 74–99)
HCT VFR BLD AUTO: 36.1 % (ref 36.3–47.1)
HGB BLD-MCNC: 11 G/DL (ref 11.9–15.1)
IRON SATN MFR SERPL: 10 % (ref 20–55)
IRON SERPL-MCNC: 28 UG/DL (ref 37–145)
MCH RBC QN AUTO: 28.1 PG (ref 25.2–33.5)
MCHC RBC AUTO-ENTMCNC: 30.5 G/DL (ref 28.4–34.8)
MCV RBC AUTO: 92.1 FL (ref 82.6–102.9)
NEUTROPHILS ABSOLUTE: 3.99 K/UL (ref 1.8–7.7)
NRBC BLD-RTO: 0 PER 100 WBC
PLATELET # BLD AUTO: 350 K/UL (ref 138–453)
PMV BLD AUTO: 10.7 FL (ref 8.1–13.5)
POTASSIUM SERPL-SCNC: 3.9 MMOL/L (ref 3.7–5.3)
PROT SERPL-MCNC: 6.7 G/DL (ref 6.6–8.7)
RBC # BLD AUTO: 3.92 M/UL (ref 3.95–5.11)
SODIUM SERPL-SCNC: 144 MMOL/L (ref 136–145)
TIBC SERPL-MCNC: 287 UG/DL (ref 250–450)
UNSATURATED IRON BINDING CAPACITY: 259 UG/DL (ref 112–347)
WBC OTHER # BLD: 6 K/UL (ref 3.5–11.3)

## 2025-04-09 DIAGNOSIS — M54.50 CHRONIC LOW BACK PAIN, UNSPECIFIED BACK PAIN LATERALITY, UNSPECIFIED WHETHER SCIATICA PRESENT: ICD-10-CM

## 2025-04-09 DIAGNOSIS — G89.29 CHRONIC LOW BACK PAIN, UNSPECIFIED BACK PAIN LATERALITY, UNSPECIFIED WHETHER SCIATICA PRESENT: ICD-10-CM

## 2025-04-09 RX ORDER — IBUPROFEN 800 MG/1
800 TABLET, FILM COATED ORAL EVERY 8 HOURS PRN
Qty: 90 TABLET | Refills: 0 | OUTPATIENT
Start: 2025-04-09

## 2025-04-09 RX ORDER — IBUPROFEN 800 MG/1
800 TABLET, FILM COATED ORAL EVERY 8 HOURS PRN
Qty: 90 TABLET | Refills: 3 | Status: SHIPPED | OUTPATIENT
Start: 2025-04-09

## 2025-06-26 ENCOUNTER — HOSPITAL ENCOUNTER (OUTPATIENT)
Age: 45
Setting detail: SPECIMEN
Discharge: HOME | End: 2025-06-26

## 2025-06-26 LAB
ALBUMIN SERPL-MCNC: 4.2 G/DL (ref 3.5–5.2)
ALBUMIN/GLOB SERPL: 1.6 {RATIO} (ref 1–2.5)
ALP SERPL-CCNC: 58 U/L (ref 35–104)
ALT SERPL-CCNC: 13 U/L (ref 10–35)
ANION GAP SERPL CALCULATED.3IONS-SCNC: 12 MMOL/L (ref 9–16)
AST SERPL-CCNC: 18 U/L (ref 10–35)
BILIRUB SERPL-MCNC: 0.4 MG/DL (ref 0–1.2)
BUN SERPL-MCNC: 10 MG/DL (ref 6–20)
CALCIUM SERPL-MCNC: 9.1 MG/DL (ref 8.6–10.4)
CHLORIDE SERPL-SCNC: 104 MMOL/L (ref 98–107)
CO2 SERPL-SCNC: 24 MMOL/L (ref 20–31)
CREAT SERPL-MCNC: 0.7 MG/DL (ref 0.6–0.9)
ERYTHROCYTE [DISTWIDTH] IN BLOOD BY AUTOMATED COUNT: 13.2 % (ref 11.8–14.4)
FERRITIN SERPL-MCNC: 155 NG/ML (ref 15–150)
GFR, ESTIMATED: >90 ML/MIN/1.73M2
GLUCOSE SERPL-MCNC: 94 MG/DL (ref 74–99)
HCT VFR BLD AUTO: 39.8 % (ref 36.3–47.1)
HGB BLD-MCNC: 12.8 G/DL (ref 11.9–15.1)
IRON SATN MFR SERPL: 48 % (ref 20–55)
IRON SERPL-MCNC: 106 UG/DL (ref 37–145)
MCH RBC QN AUTO: 29.8 PG (ref 25.2–33.5)
MCHC RBC AUTO-ENTMCNC: 32.2 G/DL (ref 28.4–34.8)
MCV RBC AUTO: 92.8 FL (ref 82.6–102.9)
NEUTROPHILS ABSOLUTE: 3.55 K/UL (ref 1.8–7.7)
NRBC BLD-RTO: 0 PER 100 WBC
PLATELET # BLD AUTO: 285 K/UL (ref 138–453)
PMV BLD AUTO: 10.8 FL (ref 8.1–13.5)
POTASSIUM SERPL-SCNC: 4.1 MMOL/L (ref 3.7–5.3)
PROT SERPL-MCNC: 6.9 G/DL (ref 6.6–8.7)
RBC # BLD AUTO: 4.29 M/UL (ref 3.95–5.11)
SODIUM SERPL-SCNC: 140 MMOL/L (ref 136–145)
TIBC SERPL-MCNC: 223 UG/DL (ref 250–450)
UNSATURATED IRON BINDING CAPACITY: 117 UG/DL (ref 112–347)
WBC OTHER # BLD: 5.6 K/UL (ref 3.5–11.3)

## 2025-07-14 PROBLEM — J30.2 SEASONAL ALLERGIES: Status: ACTIVE | Noted: 2023-10-27

## 2025-07-14 NOTE — PROGRESS NOTES
[facility] MEDICINE  [address]  [address]  [facility] [address]  Dept: [phone]  Dept Fax: [phone]  Loc: [phone]      Sneha Morales is a 44 y.o. White female. Sneha  presents to the [facility] today for   Chief Complaint   Patient presents with    Discuss Labs    Fatigue     Thyroid cancer removed 7 yr ago, tired since  Tired when wakes up    Generalized Body Aches     Back AND HIPS      Back Pain    Mood Swings     Up and down, more since the thyroid removed    Diabetes     Came after the thyroid cancer   , and;   1. Papillary thyroid carcinoma (HCC)    2. Sleep difficulties    3. Tired    4. Vitamin D deficiency    5. Weight increase          I have reviewed Sneha Morales medical, surgical and other pertinent history in detail, and have updated medication and allergy information in the computerized patient record.     Clinical Care Team:     -Referring Provider for today's consult: self  -Primary Care Provider: Referral, Self    Medical/Surgical History:   She  has a past medical history of Allergic rhinitis, Arthritis, Asthma, Cancer (HCC), Chronic back pain, Controlled type 2 diabetes mellitus without complication, without long-term current use of insulin (HCC), Headache, History of therapeutic radiation, Hypothyroidism, Obesity, Restless legs syndrome, and Thyroid cancer (HCC).  Her  has a past surgical history that includes  section; Total Thyroidectomy (Bilateral, 2017); Nerve Block (Right, 2021); Pain management procedure (Right, 2021); Pain management procedure (2021); Pain management procedure (Right, 2021); Pain management procedure (Right, 2021); and Pain management procedure (Right, 2021).    Family/Social History:     Her family history includes Arrhythmia in her father; Arthritis in her maternal grandfather and maternal grandmother; Breast Cancer (age of onset: 49) in

## 2025-07-15 ENCOUNTER — OFFICE VISIT (OUTPATIENT)
Age: 45
End: 2025-07-15
Payer: COMMERCIAL

## 2025-07-15 VITALS
DIASTOLIC BLOOD PRESSURE: 60 MMHG | WEIGHT: 178.2 LBS | TEMPERATURE: 98.1 F | OXYGEN SATURATION: 98 % | BODY MASS INDEX: 29.69 KG/M2 | SYSTOLIC BLOOD PRESSURE: 92 MMHG | RESPIRATION RATE: 10 BRPM | HEART RATE: 75 BPM | HEIGHT: 65 IN

## 2025-07-15 DIAGNOSIS — R53.83 TIRED: ICD-10-CM

## 2025-07-15 DIAGNOSIS — C73 PAPILLARY THYROID CARCINOMA (HCC): Primary | ICD-10-CM

## 2025-07-15 DIAGNOSIS — R63.5 WEIGHT INCREASE: ICD-10-CM

## 2025-07-15 DIAGNOSIS — E55.9 VITAMIN D DEFICIENCY: ICD-10-CM

## 2025-07-15 DIAGNOSIS — G47.9 SLEEP DIFFICULTIES: ICD-10-CM

## 2025-07-15 PROCEDURE — G8427 DOCREV CUR MEDS BY ELIG CLIN: HCPCS | Performed by: FAMILY MEDICINE

## 2025-07-15 PROCEDURE — 99213 OFFICE O/P EST LOW 20 MIN: CPT | Performed by: FAMILY MEDICINE

## 2025-07-15 PROCEDURE — G8419 CALC BMI OUT NRM PARAM NOF/U: HCPCS | Performed by: FAMILY MEDICINE

## 2025-07-15 PROCEDURE — 1036F TOBACCO NON-USER: CPT | Performed by: FAMILY MEDICINE

## 2025-07-15 RX ORDER — LEVOTHYROXINE SODIUM 137 UG/1
137 TABLET ORAL DAILY
COMMUNITY
Start: 2025-07-11

## 2025-07-15 RX ORDER — CALCIUM CARBONATE 1000 MG/1
TABLET, CHEWABLE ORAL
COMMUNITY
Start: 2025-06-03

## 2025-07-15 RX ORDER — INSULIN GLARGINE 100 [IU]/ML
INJECTION, SOLUTION SUBCUTANEOUS
COMMUNITY
Start: 2025-06-26

## 2025-07-15 ASSESSMENT — PATIENT HEALTH QUESTIONNAIRE - PHQ9
SUM OF ALL RESPONSES TO PHQ QUESTIONS 1-9: 0
1. LITTLE INTEREST OR PLEASURE IN DOING THINGS: NOT AT ALL
2. FEELING DOWN, DEPRESSED OR HOPELESS: NOT AT ALL

## 2025-08-03 DIAGNOSIS — G89.29 CHRONIC LOW BACK PAIN, UNSPECIFIED BACK PAIN LATERALITY, UNSPECIFIED WHETHER SCIATICA PRESENT: ICD-10-CM

## 2025-08-03 DIAGNOSIS — M54.50 CHRONIC LOW BACK PAIN, UNSPECIFIED BACK PAIN LATERALITY, UNSPECIFIED WHETHER SCIATICA PRESENT: ICD-10-CM

## 2025-08-04 RX ORDER — IBUPROFEN 800 MG/1
800 TABLET, FILM COATED ORAL EVERY 8 HOURS PRN
Qty: 90 TABLET | Refills: 0 | Status: SHIPPED | OUTPATIENT
Start: 2025-08-04

## 2025-08-19 ENCOUNTER — OFFICE VISIT (OUTPATIENT)
Dept: ORTHOPEDIC SURGERY | Age: 45
End: 2025-08-19
Payer: COMMERCIAL

## 2025-08-19 VITALS — WEIGHT: 178 LBS | RESPIRATION RATE: 14 BRPM | BODY MASS INDEX: 29.66 KG/M2 | HEIGHT: 65 IN

## 2025-08-19 DIAGNOSIS — M25.562 LEFT KNEE PAIN, UNSPECIFIED CHRONICITY: Primary | ICD-10-CM

## 2025-08-19 PROCEDURE — G8419 CALC BMI OUT NRM PARAM NOF/U: HCPCS | Performed by: ORTHOPAEDIC SURGERY

## 2025-08-19 PROCEDURE — 99213 OFFICE O/P EST LOW 20 MIN: CPT | Performed by: ORTHOPAEDIC SURGERY

## 2025-08-19 PROCEDURE — 1036F TOBACCO NON-USER: CPT | Performed by: ORTHOPAEDIC SURGERY

## 2025-08-19 PROCEDURE — G8428 CUR MEDS NOT DOCUMENT: HCPCS | Performed by: ORTHOPAEDIC SURGERY

## 2025-08-30 DIAGNOSIS — M54.50 CHRONIC LOW BACK PAIN, UNSPECIFIED BACK PAIN LATERALITY, UNSPECIFIED WHETHER SCIATICA PRESENT: ICD-10-CM

## 2025-08-30 DIAGNOSIS — G89.29 CHRONIC LOW BACK PAIN, UNSPECIFIED BACK PAIN LATERALITY, UNSPECIFIED WHETHER SCIATICA PRESENT: ICD-10-CM

## 2025-09-02 RX ORDER — IBUPROFEN 800 MG/1
800 TABLET, FILM COATED ORAL EVERY 8 HOURS PRN
Qty: 90 TABLET | Refills: 3 | Status: SHIPPED | OUTPATIENT
Start: 2025-09-02

## (undated) DEVICE — SYRINGE MED 10ML LUERLOCK TIP W/O SFTY DISP

## (undated) DEVICE — APPLICATOR MEDICATED 26 CC SOLUTION HI LT ORNG CHLORAPREP

## (undated) DEVICE — BANDAGE ADH W0.75XL3IN NAT PLAS CURAD

## (undated) DEVICE — NEEDLE: Brand: SPROTTE®

## (undated) DEVICE — TRAY NRV BLK SUPP CUST

## (undated) DEVICE — SET EXTN SM 0.5ML L13IN BOR W/O INJ SITE

## (undated) DEVICE — NEEDLE FLTR 18GA L1.5IN MEM THK5UM BLNT DISP

## (undated) DEVICE — MARKER,SKIN,WI/RULER AND LABELS: Brand: MEDLINE

## (undated) DEVICE — APPLICATOR MEDICATED 10.5 CC SOLUTION SCRB TEAL CHLORAPREP

## (undated) DEVICE — GLOVE ORANGE PI 8 1/2   MSG9085

## (undated) DEVICE — APPLICATOR MEDICATED 10.5 CC SOLUTION HI LT ORNG CHLORAPREP

## (undated) DEVICE — TOWEL,OR,DSP,ST,NATURAL,DLX,4/PK,20PK/CS: Brand: MEDLINE

## (undated) DEVICE — GLOVE ORANGE PI 8   MSG9080

## (undated) DEVICE — GLOVE ORANGE PI 7   MSG9070